# Patient Record
Sex: MALE | Race: ASIAN | NOT HISPANIC OR LATINO
[De-identification: names, ages, dates, MRNs, and addresses within clinical notes are randomized per-mention and may not be internally consistent; named-entity substitution may affect disease eponyms.]

---

## 2021-03-30 ENCOUNTER — APPOINTMENT (OUTPATIENT)
Dept: PULMONOLOGY | Facility: CLINIC | Age: 46
End: 2021-03-30

## 2021-06-01 DIAGNOSIS — Z01.812 ENCOUNTER FOR PREPROCEDURAL LABORATORY EXAMINATION: ICD-10-CM

## 2021-06-28 ENCOUNTER — APPOINTMENT (OUTPATIENT)
Dept: PULMONOLOGY | Facility: CLINIC | Age: 46
End: 2021-06-28
Payer: COMMERCIAL

## 2021-06-28 ENCOUNTER — NON-APPOINTMENT (OUTPATIENT)
Age: 46
End: 2021-06-28

## 2021-06-28 VITALS
DIASTOLIC BLOOD PRESSURE: 84 MMHG | TEMPERATURE: 97.2 F | HEIGHT: 71 IN | OXYGEN SATURATION: 97 % | WEIGHT: 229 LBS | RESPIRATION RATE: 16 BRPM | BODY MASS INDEX: 32.06 KG/M2 | SYSTOLIC BLOOD PRESSURE: 126 MMHG | HEART RATE: 92 BPM

## 2021-06-28 DIAGNOSIS — Z86.19 PERSONAL HISTORY OF OTHER INFECTIOUS AND PARASITIC DISEASES: ICD-10-CM

## 2021-06-28 DIAGNOSIS — J30.9 ALLERGIC RHINITIS, UNSPECIFIED: ICD-10-CM

## 2021-06-28 DIAGNOSIS — Z83.49 FAMILY HISTORY OF OTHER ENDOCRINE, NUTRITIONAL AND METABOLIC DISEASES: ICD-10-CM

## 2021-06-28 DIAGNOSIS — E66.3 OVERWEIGHT: ICD-10-CM

## 2021-06-28 DIAGNOSIS — Z82.5 FAMILY HISTORY OF ASTHMA AND OTHER CHRONIC LOWER RESPIRATORY DISEASES: ICD-10-CM

## 2021-06-28 DIAGNOSIS — Z88.9 ALLERGY STATUS TO UNSPECIFIED DRUGS, MEDICAMENTS AND BIOLOGICAL SUBSTANCES: ICD-10-CM

## 2021-06-28 PROCEDURE — 71046 X-RAY EXAM CHEST 2 VIEWS: CPT

## 2021-06-28 PROCEDURE — 95012 NITRIC OXIDE EXP GAS DETER: CPT

## 2021-06-28 PROCEDURE — 94618 PULMONARY STRESS TESTING: CPT

## 2021-06-28 PROCEDURE — 99204 OFFICE O/P NEW MOD 45 MIN: CPT | Mod: 25

## 2021-06-28 PROCEDURE — 94010 BREATHING CAPACITY TEST: CPT

## 2021-06-28 PROCEDURE — 99072 ADDL SUPL MATRL&STAF TM PHE: CPT

## 2021-06-28 RX ORDER — OLOPATADINE HYDROCHLORIDE 665 UG/1
0.6 SPRAY, METERED NASAL
Qty: 3 | Refills: 1 | Status: ACTIVE | COMMUNITY
Start: 2021-06-28 | End: 1900-01-01

## 2021-06-28 NOTE — REVIEW OF SYSTEMS
[Negative] : Endocrine [Postnasal Drip] : postnasal drip [Cough] : cough [Sputum] : sputum [Wheezing] : wheezing

## 2021-06-28 NOTE — ADDENDUM
[FreeTextEntry1] : Documented by Carlos Velásquez acting as a scribe for Dr. Kel Ybarra on (06/28/2021).\par \par All medical record entries made by the Scribe were at my, Dr. Kel Ybarra's, direction and personally dictated by me on (06/28/2021). I have reviewed the chart and agree that the record accurately reflects my personal performance of the history, physical exam, assessment and plan. I have also personally directed, reviewed, and agree with the discharge instructions.\par

## 2021-06-28 NOTE — HISTORY OF PRESENT ILLNESS
[TextBox_4] : Mr. TAMEZ is a 46 year male with a history of chicken pox as a child, OW, allergies who now comes in for an initial pulmonary evaluation. His chief complaint is\par -He notes being recommended for a Pulmonologist \par -He notes excessive phlegm production in the early spring and mid fall\par -He notes getting dry eyes in the allergy season\par -He notes PND in this season\par -He notes having 2 cats \par -He notes being SOB before starting to exercise, but is now improving as he is exercising more \par -He denies SOB on his back or in the middle of the night\par -He notes snoring, and is worsened when more fatigued\par -He notes waking up feeling well rested\par - he reports being able to fall asleep while watching a boring TV show \par -He notes dislocating his shoulder in the past \par -He notes coughing\par -He notes a slight intermittent wheeze \par -he notes his bowels are regular \par -He notes being lactose intolerant \par -He notes when consuming lactose getting stomach aches, gas\par -He notes taking multivitamins and calcium\par -He denies the feeling of a lump in his throat he needs to clear\par -He denies nocturia\par -He notes getting poked in his eye which has damaged his eye sight in his left eye \par \par - patient denies any headaches, nausea, vomiting, fever, chills, sweats, chest pain, chest pressure, palpitations, fatigue, diarrhea, constipation, dysphagia, myalgias, dizziness, leg swelling, leg pain, itchy eyes, itchy ears, heartburn, reflux or sour taste in the mouth

## 2021-06-28 NOTE — PHYSICAL EXAM
General [No Acute Distress] : no acute distress [Normal Oropharynx] : normal oropharynx [Normal Appearance] : normal appearance [No Neck Mass] : no neck mass [Normal Rate/Rhythm] : normal rate/rhythm [Normal S1, S2] : normal s1, s2 [No Murmurs] : no murmurs [No Resp Distress] : no resp distress [Clear to Auscultation Bilaterally] : clear to auscultation bilaterally [No Abnormalities] : no abnormalities [Benign] : benign [Normal Gait] : normal gait [No Clubbing] : no clubbing [No Cyanosis] : no cyanosis [No Edema] : no edema [FROM] : FROM [Normal Color/ Pigmentation] : normal color/ pigmentation [No Focal Deficits] : no focal deficits [Oriented x3] : oriented x3 [Normal Affect] : normal affect [III] : Mallampati Class: III [TextBox_2] : OW [TextBox_68] : I:E 1:3; Clear

## 2021-06-28 NOTE — ASSESSMENT
[FreeTextEntry1] : Mr. TAMEZ is a 46 year male with a history of chicken pox as a child, infantile PNA, OW, allergies, lactose intolerance who now comes in for an initial pulmonary evaluation for SOB, ?asthma, allergies, ?ELDON\par \par The patient's SOB is felt to be multifactorial:\par -poor mechanics of breathing\par -out of shape/overweight\par -Pulmonary\par    -mild asthma \par    -Allergy/sinus\par    -?ELDON\par \par Problem 1: mild asthma (driven by underlying allergies)\par - add Ventolin 2 puffs Q6H, pre-exercise \par - add Symbicort 160 2 inhalations BID\par - Asthma is believed to be caused by inherited (genetic) and environmental factor, but its exact cause is unknown. asthma may be triggered by allergens, lung infections, or irritants in the air. Asthma triggers are different for each person \par \par Problem 2: Allergy/Sinus\par - add Olopatadine 0.6% 1 sniff BID\par -get Blood work to include: asthma panel, food IgE panel, IgE level, eosinophil level, vitamin D level\par -Environmental measures for allergies were encouraged including mattress and pillow cover, air purifier, and environmental controls.\par \par \par Problem 3: ?ELDON (RF: neck size 17-18, elevated Mallampati class)\par -complete home sleep study \par -Sleep apnea is associated with adverse clinical consequences which can affect most organ systems. Cardiovascular disease risk includes arrhythmias, atrial fibrillation, hypertension, coronary artery disease, and stroke. Metabolic disorders include diabetes type 2, non-alcoholic fatty liver disease. Mood disorder especially depression; and cognitive decline especially in the elderly. Associations with chronic reflux/Youssef’s esophagus some but not all inclusive. \par -Reasons include arousal consistent with hypopnea; respiratory events most prominent in REM sleep or supine position; therefore sleep staging and body position are important for accurate diagnosis and estimation of AHI. \par \par \par Problem 4:overweight/out of shape\par - Weight loss, exercise and diet control were discussed and are highly encouraged. Treatment options were given such as aqua therapy, and contacting a nutritionist. Recommended to use the elliptical, stationary bike, less use of treadmill. Mindful eating was explained to the patient. Obesity is associated with worsening asthma, SOB, and potential for cardiac disease, diabetes, and other underlying medical conditions.\par \par Problem 5: Poor mechanics of breathing\par -Recommended Wimhof and Buteyko breathing techniques \par - Proper breathing techniques were reviewed with an emphasis on exhalation. Patient instructed to breath in for 1 second and out for four seconds. Patient was encouraged not to talk while walking.\par \par Problem 6: Health Maintenance\par -s/p flu shot\par -recommended strep pneumonia vaccines: Prevnar-13 vaccine, follow by Pneumo vaccine 23 one year following\par -recommended early intervention for URIs\par -recommended regular osteoporosis evaluations\par -recommended early dermatological evaluations\par -recommended after the age of 50 to the age of 70, colonoscopy every 5 years\par \par f/u in 6-8 weeks\par pt is encouraged to call or fax the office with any questions or concerns.\par

## 2021-08-13 ENCOUNTER — APPOINTMENT (OUTPATIENT)
Dept: PULMONOLOGY | Facility: CLINIC | Age: 46
End: 2021-08-13

## 2021-08-30 ENCOUNTER — LABORATORY RESULT (OUTPATIENT)
Age: 46
End: 2021-08-30

## 2021-08-30 LAB
24R-OH-CALCIDIOL SERPL-MCNC: 70.9 PG/ML
25(OH)D3 SERPL-MCNC: 16.3 NG/ML
BASOPHILS # BLD AUTO: 0.07 K/UL
BASOPHILS NFR BLD AUTO: 0.9 %
EOSINOPHIL # BLD AUTO: 0.18 K/UL
EOSINOPHIL NFR BLD AUTO: 2.2 %
HCT VFR BLD CALC: 47.3 %
HGB BLD-MCNC: 15.4 G/DL
IMM GRANULOCYTES NFR BLD AUTO: 0.5 %
LYMPHOCYTES # BLD AUTO: 2.04 K/UL
LYMPHOCYTES NFR BLD AUTO: 25.5 %
MAN DIFF?: NORMAL
MCHC RBC-ENTMCNC: 28.4 PG
MCHC RBC-ENTMCNC: 32.6 GM/DL
MCV RBC AUTO: 87.3 FL
MONOCYTES # BLD AUTO: 0.49 K/UL
MONOCYTES NFR BLD AUTO: 6.1 %
NEUTROPHILS # BLD AUTO: 5.19 K/UL
NEUTROPHILS NFR BLD AUTO: 64.8 %
PLATELET # BLD AUTO: 310 K/UL
RBC # BLD: 5.42 M/UL
RBC # FLD: 13.3 %
WBC # FLD AUTO: 8.01 K/UL

## 2021-09-01 LAB — TOTAL IGE SMQN RAST: 377 KU/L

## 2021-09-02 LAB
A ALTERNATA IGE QN: 0.89 KUA/L
A FUMIGATUS IGE QN: <0.1 KUA/L
C ALBICANS IGE QN: 0.31 KUA/L
C HERBARUM IGE QN: <0.1 KUA/L
CAT DANDER IGE QN: 32.3 KUA/L
CLAM IGE QN: <0.1 KUA/L
CODFISH IGE QN: <0.1 KUA/L
COMMON RAGWEED IGE QN: 0.11 KUA/L
CORN IGE QN: <0.1 KUA/L
COW MILK IGE QN: 0.49 KUA/L
D FARINAE IGE QN: 0.77 KUA/L
D PTERONYSS IGE QN: 0.47 KUA/L
DEPRECATED A ALTERNATA IGE RAST QL: 2
DEPRECATED A FUMIGATUS IGE RAST QL: 0
DEPRECATED C ALBICANS IGE RAST QL: NORMAL
DEPRECATED C HERBARUM IGE RAST QL: 0
DEPRECATED CAT DANDER IGE RAST QL: 4
DEPRECATED CLAM IGE RAST QL: 0
DEPRECATED CODFISH IGE RAST QL: 0
DEPRECATED COMMON RAGWEED IGE RAST QL: NORMAL
DEPRECATED CORN IGE RAST QL: 0
DEPRECATED COW MILK IGE RAST QL: 1
DEPRECATED D FARINAE IGE RAST QL: 2
DEPRECATED D PTERONYSS IGE RAST QL: 1
DEPRECATED DOG DANDER IGE RAST QL: 2
DEPRECATED EGG WHITE IGE RAST QL: 0
DEPRECATED M RACEMOSUS IGE RAST QL: 0
DEPRECATED PEANUT IGE RAST QL: 0
DEPRECATED ROACH IGE RAST QL: 2
DEPRECATED SCALLOP IGE RAST QL: <0.1 KUA/L
DEPRECATED SESAME SEED IGE RAST QL: 0
DEPRECATED SHRIMP IGE RAST QL: 1
DEPRECATED SOYBEAN IGE RAST QL: 0
DEPRECATED TIMOTHY IGE RAST QL: NORMAL
DEPRECATED WALNUT IGE RAST QL: 0
DEPRECATED WHEAT IGE RAST QL: 0
DEPRECATED WHITE OAK IGE RAST QL: 3
DOG DANDER IGE QN: 3.09 KUA/L
EGG WHITE IGE QN: <0.1 KUA/L
M RACEMOSUS IGE QN: <0.1 KUA/L
PEANUT IGE QN: <0.1 KUA/L
ROACH IGE QN: 0.91 KUA/L
SCALLOP IGE QN: 0
SCALLOP IGE QN: 0.41 KUA/L
SESAME SEED IGE QN: <0.1 KUA/L
SOYBEAN IGE QN: <0.1 KUA/L
TIMOTHY IGE QN: 0.29 KUA/L
WALNUT IGE QN: <0.1 KUA/L
WHEAT IGE QN: <0.1 KUA/L
WHITE OAK IGE QN: 7.31 KUA/L

## 2021-09-23 ENCOUNTER — NON-APPOINTMENT (OUTPATIENT)
Age: 46
End: 2021-09-23

## 2022-10-04 ENCOUNTER — APPOINTMENT (OUTPATIENT)
Dept: PULMONOLOGY | Facility: CLINIC | Age: 47
End: 2022-10-04

## 2022-10-04 ENCOUNTER — RX RENEWAL (OUTPATIENT)
Age: 47
End: 2022-10-04

## 2022-10-04 VITALS
HEART RATE: 105 BPM | DIASTOLIC BLOOD PRESSURE: 84 MMHG | OXYGEN SATURATION: 99 % | SYSTOLIC BLOOD PRESSURE: 130 MMHG | BODY MASS INDEX: 30.07 KG/M2 | TEMPERATURE: 97.1 F | WEIGHT: 222 LBS | RESPIRATION RATE: 16 BRPM | HEIGHT: 72 IN

## 2022-10-04 DIAGNOSIS — R05.1 ACUTE COUGH: ICD-10-CM

## 2022-10-04 DIAGNOSIS — R09.89 OTHER SPECIFIED SYMPTOMS AND SIGNS INVOLVING THE CIRCULATORY AND RESPIRATORY SYSTEMS: ICD-10-CM

## 2022-10-04 DIAGNOSIS — G93.3 POSTVIRAL FATIGUE SYNDROME: ICD-10-CM

## 2022-10-04 DIAGNOSIS — J45.909 UNSPECIFIED ASTHMA, UNCOMPLICATED: ICD-10-CM

## 2022-10-04 PROCEDURE — 99214 OFFICE O/P EST MOD 30 MIN: CPT | Mod: 25

## 2022-10-04 PROCEDURE — 94010 BREATHING CAPACITY TEST: CPT

## 2022-10-04 PROCEDURE — 94729 DIFFUSING CAPACITY: CPT

## 2022-10-04 PROCEDURE — 94727 GAS DIL/WSHOT DETER LNG VOL: CPT

## 2022-10-04 RX ORDER — ALBUTEROL SULFATE 90 UG/1
108 (90 BASE) INHALANT RESPIRATORY (INHALATION)
Qty: 3 | Refills: 1 | Status: ACTIVE | COMMUNITY
Start: 2022-10-04 | End: 1900-01-01

## 2022-10-04 RX ORDER — FAMOTIDINE 40 MG/1
40 TABLET, FILM COATED ORAL
Qty: 30 | Refills: 3 | Status: ACTIVE | COMMUNITY
Start: 2022-10-04 | End: 1900-01-01

## 2022-10-04 RX ORDER — BUDESONIDE AND FORMOTEROL FUMARATE DIHYDRATE 160; 4.5 UG/1; UG/1
160-4.5 AEROSOL RESPIRATORY (INHALATION) TWICE DAILY
Qty: 1 | Refills: 3 | Status: ACTIVE | COMMUNITY
Start: 2021-06-28 | End: 1900-01-01

## 2022-10-04 RX ORDER — PROMETHAZINE HYDROCHLORIDE AND CODEINE PHOSPHATE 6.25; 1 MG/5ML; MG/5ML
6.25-1 SOLUTION ORAL
Qty: 240 | Refills: 0 | Status: ACTIVE | COMMUNITY
Start: 2022-10-04 | End: 1900-01-01

## 2022-10-04 RX ORDER — PREDNISONE 10 MG/1
10 TABLET ORAL
Qty: 21 | Refills: 0 | Status: ACTIVE | COMMUNITY
Start: 2022-10-04 | End: 1900-01-01

## 2022-10-04 RX ORDER — PROMETHAZINE HYDROCHLORIDE AND PHENYLEPHRINE HYDROCHLORIDE 6.25; 5 MG/5ML; MG/5ML
6.25-5 SYRUP ORAL
Qty: 1 | Refills: 0 | Status: ACTIVE | COMMUNITY
Start: 2022-10-04 | End: 1900-01-01

## 2022-10-04 NOTE — REVIEW OF SYSTEMS
[Cough] : cough [Sputum] : sputum [Wheezing] : wheezing [Negative] : Endocrine [Nasal Congestion] : no nasal congestion [Postnasal Drip] : no postnasal drip [Sinus Problems] : no sinus problems [Chest Tightness] : chest tightness [Dyspnea] : dyspnea [SOB on Exertion] : sob on exertion [TextBox_14] : persistent throat clearing [TextBox_69] : possible reflux

## 2022-10-04 NOTE — ASSESSMENT
[FreeTextEntry1] : Mr. TAMEZ is a 47 year male with a history of chicken pox as a child, infantile PNA, OW, allergies, lactose intolerance who now comes in for follow up/acute visit for cough and SOB. He seems to be recovering from possible walking PNA vs asthmatic bronchitis with subsequent post viral cough.\par We will [facility] results [facility]. \par \par The patient's SOB is felt to be multifactorial:\par -poor mechanics of breathing\par -out of shape/overweight\par -Pulmonary\par    -mild asthma \par    -Allergy/sinus\par    -?ELDON\par \par Problem 1: Acute asthmatic type bronchitis vs PNA\par -[facility]\par -s/p zpak\par -failed tessalon perles for cough\par -follow up cxr in 2 weeks to check for resolution of right sided findings (awaiting report)\par \par Problem 2: Cough and SOB related to asthma hx/post viral cough\par - add Ventolin 2 puffs am and pm for the next week\par - add Symbicort 160 2 inhalations BID and some gargle after use\par -add prednisone 20 mg x 7, 10 mg x 7 in am with food\par -add promethazine with codeine 5-10ml QHS for comfort from cough\par \par Problem 3: globus sensation/probable LPR\par -add famotidine 40 mg PO QHS\par \par Problem 4: chest congestion\par -add mucinex DM with good hydration (samples given)\par \par f/u in 3 months with Dr. Ybarra\par advised him to call office with update on cough- if not improving, he needs to let the office know. \par pt is encouraged to call or fax the office with any questions or concerns.\par

## 2022-10-04 NOTE — HISTORY OF PRESENT ILLNESS
[TextBox_4] : Initial VISIT 1/28/2021:\par Mr. TAMEZ is a 46 year male with a history of chicken pox as a child, OW, allergies who now comes in for an initial pulmonary evaluation. His chief complaint is\par -He notes being recommended for a Pulmonologist \par -He notes excessive phlegm production in the early spring and mid fall\par -He notes getting dry eyes in the allergy season\par -He notes PND in this season\par -He notes having 2 cats \par -He notes being SOB before starting to exercise, but is now improving as he is exercising more \par -He denies SOB on his back or in the middle of the night\par -He notes snoring, and is worsened when more fatigued\par -He notes waking up feeling well rested\par - he reports being able to fall asleep while watching a boring TV show \par -He notes dislocating his shoulder in the past \par -He notes coughing\par -He notes a slight intermittent wheeze \par -he notes his bowels are regular \par -He notes being lactose intolerant \par -He notes when consuming lactose getting stomach aches, gas\par -He notes taking multivitamins and calcium\par -He denies the feeling of a lump in his throat he needs to clear\par -He denies nocturia\par -He notes getting poked in his eye which has damaged his eye sight in his left eye \par \par - patient denies any headaches, nausea, vomiting, fever, chills, sweats, chest pain, chest pressure, palpitations, fatigue, diarrhea, constipation, dysphagia, myalgias, dizziness, leg swelling, leg pain, itchy eyes, itchy ears, heartburn, reflux or sour taste in the mouth \par \par VISIT TODAY 10/4/2022:\par Mr. TAMEZ is a 47 year male with a history of chicken pox as a child, infantile PNA, OW, allergies, lactose intolerance who now comes in for follow up/acute visit for cough and SOB.\par \par At the last visit here pt reports he was told he had some mild asthma and was started on inhalers. \par He felt better and discontinued the inhalers at some point. \par He was in his normal state of health till his children brought home something from school that got him sick.  Main symptoms included severe sore throat, fatigue.  He went to his PCP was tested for strep and COVID, both test came back negative.  He then developed a cough and started to feel more short of breath.  [facility] approximately week and a half later since there was not much resolution.  He reports he had a chest x-ray onsite and was told something may be brewing on the right side.  He was sent home with Tessalon Perles and a Z-Harish.\par \par At this point, other symptoms have resolved but he continues to have a severe cough that is worse at night.  It is keeping him from sleep. Additionally he feels like he cannot take a deep breath in.  He feels like he is not getting enough air.  Breathing and is more difficult than breathing out. He went back on his albuterol inhaler and is using it a.m. and p.m.  He feels like he would use it more if he was allowed to. He feels it does help with both the cough and breathiness. The Tessalon Perles did not help his cough.  He has been taking TheraFlu and NyQuil in excess at night to help knock him out for sleep.  He reports his mother says he is still coughing through the night.  He reports chest tightness/heaviness and chest congestion. When he gets mucus up he reports feeling so much relief. \par \par He is unsure about acid reflux but does admit to persistent throat clearing.\par He is bringing up some dark green mucus on and off, but mainly the cough is dry.\par He wishes he could get more mucus up.

## 2022-10-04 NOTE — PHYSICAL EXAM
[No Acute Distress] : no acute distress [Normal Oropharynx] : normal oropharynx [III] : Mallampati Class: III [No Neck Mass] : no neck mass [Normal Rate/Rhythm] : normal rate/rhythm [Normal S1, S2] : normal s1, s2 [No Murmurs] : no murmurs [No Resp Distress] : no resp distress [Clear to Auscultation Bilaterally] : clear to auscultation bilaterally [No Abnormalities] : no abnormalities [Benign] : benign [Normal Gait] : normal gait [No Clubbing] : no clubbing [No Cyanosis] : no cyanosis [No Edema] : no edema [FROM] : FROM [Normal Color/ Pigmentation] : normal color/ pigmentation [No Focal Deficits] : no focal deficits [Oriented x3] : oriented x3 [Normal Affect] : normal affect [Well Nourished] : well nourished [Normal Appearance] : normal appearance [Well Groomed] : well groomed [No Deformities] : no deformities [Well Developed] : well developed [Supple] : supple [No JVD] : no jvd [Normal Pulses] : normal pulses [No Acc Muscle Use] : no acc muscle use [Normal Palpation] : normal palpation [Normal Rhythm and Effort] : normal rhythm and effort [Normal Mood] : normal mood [Remote Memory Normal] : remote memory normal [TextBox_2] : OW [TextBox_11] : evidence of reflux on tongue [TextBox_68] : I:E 1:3; Clear

## 2022-10-04 NOTE — PROCEDURE
[FreeTextEntry1] : PFTs revealed normal flows\par Normal lung volumes\par Normal diffusion capacity\par \par

## 2022-10-28 ENCOUNTER — EMERGENCY (EMERGENCY)
Facility: HOSPITAL | Age: 47
LOS: 1 days | Discharge: HOME | End: 2022-10-28
Attending: EMERGENCY MEDICINE | Admitting: EMERGENCY MEDICINE

## 2022-10-28 VITALS
RESPIRATION RATE: 18 BRPM | TEMPERATURE: 99 F | HEART RATE: 105 BPM | SYSTOLIC BLOOD PRESSURE: 125 MMHG | DIASTOLIC BLOOD PRESSURE: 83 MMHG | OXYGEN SATURATION: 100 %

## 2022-10-28 VITALS
OXYGEN SATURATION: 97 % | DIASTOLIC BLOOD PRESSURE: 100 MMHG | RESPIRATION RATE: 24 BRPM | HEART RATE: 130 BPM | SYSTOLIC BLOOD PRESSURE: 181 MMHG | TEMPERATURE: 99 F

## 2022-10-28 LAB
ALBUMIN SERPL ELPH-MCNC: 4.9 G/DL — SIGNIFICANT CHANGE UP (ref 3.3–5)
ALP SERPL-CCNC: 71 U/L — SIGNIFICANT CHANGE UP (ref 40–120)
ALT FLD-CCNC: 29 U/L — SIGNIFICANT CHANGE UP (ref 4–41)
ANION GAP SERPL CALC-SCNC: 10 MMOL/L — SIGNIFICANT CHANGE UP (ref 7–14)
AST SERPL-CCNC: 15 U/L — SIGNIFICANT CHANGE UP (ref 4–40)
B PERT DNA SPEC QL NAA+PROBE: SIGNIFICANT CHANGE UP
B PERT+PARAPERT DNA PNL SPEC NAA+PROBE: SIGNIFICANT CHANGE UP
BASOPHILS # BLD AUTO: 0.03 K/UL — SIGNIFICANT CHANGE UP (ref 0–0.2)
BASOPHILS NFR BLD AUTO: 0.2 % — SIGNIFICANT CHANGE UP (ref 0–2)
BILIRUB SERPL-MCNC: 0.5 MG/DL — SIGNIFICANT CHANGE UP (ref 0.2–1.2)
BORDETELLA PARAPERTUSSIS (RAPRVP): SIGNIFICANT CHANGE UP
BUN SERPL-MCNC: 11 MG/DL — SIGNIFICANT CHANGE UP (ref 7–23)
C PNEUM DNA SPEC QL NAA+PROBE: SIGNIFICANT CHANGE UP
CALCIUM SERPL-MCNC: 10.3 MG/DL — SIGNIFICANT CHANGE UP (ref 8.4–10.5)
CHLORIDE SERPL-SCNC: 94 MMOL/L — LOW (ref 98–107)
CO2 SERPL-SCNC: 29 MMOL/L — SIGNIFICANT CHANGE UP (ref 22–31)
CREAT SERPL-MCNC: 0.66 MG/DL — SIGNIFICANT CHANGE UP (ref 0.5–1.3)
EGFR: 116 ML/MIN/1.73M2 — SIGNIFICANT CHANGE UP
EOSINOPHIL # BLD AUTO: 0 K/UL — SIGNIFICANT CHANGE UP (ref 0–0.5)
EOSINOPHIL NFR BLD AUTO: 0 % — SIGNIFICANT CHANGE UP (ref 0–6)
FLUAV SUBTYP SPEC NAA+PROBE: SIGNIFICANT CHANGE UP
FLUBV RNA SPEC QL NAA+PROBE: SIGNIFICANT CHANGE UP
GAS PNL BLDV: SIGNIFICANT CHANGE UP
GLUCOSE SERPL-MCNC: 135 MG/DL — HIGH (ref 70–99)
HADV DNA SPEC QL NAA+PROBE: SIGNIFICANT CHANGE UP
HCOV 229E RNA SPEC QL NAA+PROBE: SIGNIFICANT CHANGE UP
HCOV HKU1 RNA SPEC QL NAA+PROBE: SIGNIFICANT CHANGE UP
HCOV NL63 RNA SPEC QL NAA+PROBE: SIGNIFICANT CHANGE UP
HCOV OC43 RNA SPEC QL NAA+PROBE: SIGNIFICANT CHANGE UP
HCT VFR BLD CALC: 47.2 % — SIGNIFICANT CHANGE UP (ref 39–50)
HGB BLD-MCNC: 15.6 G/DL — SIGNIFICANT CHANGE UP (ref 13–17)
HMPV RNA SPEC QL NAA+PROBE: SIGNIFICANT CHANGE UP
HPIV1 RNA SPEC QL NAA+PROBE: SIGNIFICANT CHANGE UP
HPIV2 RNA SPEC QL NAA+PROBE: SIGNIFICANT CHANGE UP
HPIV3 RNA SPEC QL NAA+PROBE: SIGNIFICANT CHANGE UP
HPIV4 RNA SPEC QL NAA+PROBE: SIGNIFICANT CHANGE UP
IANC: 18.05 K/UL — HIGH (ref 1.8–7.4)
IMM GRANULOCYTES NFR BLD AUTO: 0.8 % — SIGNIFICANT CHANGE UP (ref 0–0.9)
LIDOCAIN IGE QN: 30 U/L — SIGNIFICANT CHANGE UP (ref 7–60)
LYMPHOCYTES # BLD AUTO: 1.01 K/UL — SIGNIFICANT CHANGE UP (ref 1–3.3)
LYMPHOCYTES # BLD AUTO: 5.1 % — LOW (ref 13–44)
M PNEUMO DNA SPEC QL NAA+PROBE: SIGNIFICANT CHANGE UP
MCHC RBC-ENTMCNC: 28.2 PG — SIGNIFICANT CHANGE UP (ref 27–34)
MCHC RBC-ENTMCNC: 33.1 GM/DL — SIGNIFICANT CHANGE UP (ref 32–36)
MCV RBC AUTO: 85.4 FL — SIGNIFICANT CHANGE UP (ref 80–100)
MONOCYTES # BLD AUTO: 0.75 K/UL — SIGNIFICANT CHANGE UP (ref 0–0.9)
MONOCYTES NFR BLD AUTO: 3.8 % — SIGNIFICANT CHANGE UP (ref 2–14)
NEUTROPHILS # BLD AUTO: 18.05 K/UL — HIGH (ref 1.8–7.4)
NEUTROPHILS NFR BLD AUTO: 90.1 % — HIGH (ref 43–77)
NRBC # BLD: 0 /100 WBCS — SIGNIFICANT CHANGE UP (ref 0–0)
NRBC # FLD: 0 K/UL — SIGNIFICANT CHANGE UP (ref 0–0)
PLATELET # BLD AUTO: 339 K/UL — SIGNIFICANT CHANGE UP (ref 150–400)
POTASSIUM SERPL-MCNC: 4.1 MMOL/L — SIGNIFICANT CHANGE UP (ref 3.5–5.3)
POTASSIUM SERPL-SCNC: 4.1 MMOL/L — SIGNIFICANT CHANGE UP (ref 3.5–5.3)
PROT SERPL-MCNC: 8 G/DL — SIGNIFICANT CHANGE UP (ref 6–8.3)
RAPID RVP RESULT: DETECTED
RBC # BLD: 5.53 M/UL — SIGNIFICANT CHANGE UP (ref 4.2–5.8)
RBC # FLD: 12.9 % — SIGNIFICANT CHANGE UP (ref 10.3–14.5)
RSV RNA SPEC QL NAA+PROBE: SIGNIFICANT CHANGE UP
RV+EV RNA SPEC QL NAA+PROBE: SIGNIFICANT CHANGE UP
SARS-COV-2 RNA SPEC QL NAA+PROBE: DETECTED
SODIUM SERPL-SCNC: 133 MMOL/L — LOW (ref 135–145)
WBC # BLD: 19.99 K/UL — HIGH (ref 3.8–10.5)
WBC # FLD AUTO: 19.99 K/UL — HIGH (ref 3.8–10.5)

## 2022-10-28 PROCEDURE — 99284 EMERGENCY DEPT VISIT MOD MDM: CPT

## 2022-10-28 PROCEDURE — 71045 X-RAY EXAM CHEST 1 VIEW: CPT | Mod: 26

## 2022-10-28 PROCEDURE — 93010 ELECTROCARDIOGRAM REPORT: CPT

## 2022-10-28 PROCEDURE — 76705 ECHO EXAM OF ABDOMEN: CPT | Mod: 26

## 2022-10-28 PROCEDURE — 74177 CT ABD & PELVIS W/CONTRAST: CPT | Mod: 26,MA

## 2022-10-28 RX ORDER — SODIUM CHLORIDE 9 MG/ML
1000 INJECTION INTRAMUSCULAR; INTRAVENOUS; SUBCUTANEOUS ONCE
Refills: 0 | Status: COMPLETED | OUTPATIENT
Start: 2022-10-28 | End: 2022-10-28

## 2022-10-28 RX ORDER — MORPHINE SULFATE 50 MG/1
4 CAPSULE, EXTENDED RELEASE ORAL ONCE
Refills: 0 | Status: DISCONTINUED | OUTPATIENT
Start: 2022-10-28 | End: 2022-10-28

## 2022-10-28 RX ORDER — METOCLOPRAMIDE HCL 10 MG
10 TABLET ORAL ONCE
Refills: 0 | Status: COMPLETED | OUTPATIENT
Start: 2022-10-28 | End: 2022-10-28

## 2022-10-28 RX ADMIN — MORPHINE SULFATE 4 MILLIGRAM(S): 50 CAPSULE, EXTENDED RELEASE ORAL at 08:49

## 2022-10-28 RX ADMIN — Medication 10 MILLIGRAM(S): at 08:49

## 2022-10-28 RX ADMIN — SODIUM CHLORIDE 1000 MILLILITER(S): 9 INJECTION INTRAMUSCULAR; INTRAVENOUS; SUBCUTANEOUS at 12:01

## 2022-10-28 RX ADMIN — MORPHINE SULFATE 4 MILLIGRAM(S): 50 CAPSULE, EXTENDED RELEASE ORAL at 10:33

## 2022-10-28 RX ADMIN — SODIUM CHLORIDE 1000 MILLILITER(S): 9 INJECTION INTRAMUSCULAR; INTRAVENOUS; SUBCUTANEOUS at 10:27

## 2022-10-28 NOTE — ED PROVIDER NOTE - CLINICAL SUMMARY MEDICAL DECISION MAKING FREE TEXT BOX
47y male with no significant PMH or surgical history presenting with intense, diffuse abdominal pain since yesterday. Pain is increasing in severity. Multiple episodes of +N/V. Patient was diagnosed with COVID last week but symptoms have been improving. Patient is tachycardic and tachypnic. ORdered labs, meds, and imaging. Concern for ACS vs GI pathology (colitis, appendicitis, pancreatitis, cholecystitis) less likely ischemic at this time due to no PMH or evidence of afib. Will re-assess after labs, meds, imaging.

## 2022-10-28 NOTE — ED ADULT NURSE REASSESSMENT NOTE - NS ED NURSE REASSESS COMMENT FT1
Pt resting comfortably in stretcher, no signs of distress. Pending CT results. Fall precautions in place. Will continue to monitor.
Pt resting comfortable in stretcher, no signs of distress. Pt denies N/V, headache, abdominal pain, SOB. Vitals are stable. Pt waiting for ultrasound results. Fall precautions in place. Wife at bedside. Will continue to monitor.

## 2022-10-28 NOTE — ED PROVIDER NOTE - CARE PLAN
1 Principal Discharge DX:	Abdominal pain  Secondary Diagnosis:	2019 novel coronavirus disease (COVID-19)  Secondary Diagnosis:	Adenomyomatosis of gallbladder  Secondary Diagnosis:	Cholelithiasis

## 2022-10-28 NOTE — ED PROVIDER NOTE - PHYSICAL EXAMINATION
PHYSICAL EXAM:  CONSTITUTIONAL: appearing, awake, alert, oriented to person, place, time/situation and in mild distress.  HEAD: Atraumatic  EYES: Clear bilaterally, pupils equal, round and reactive to light.  ENMT: Airway patent, Nasal mucosa clear. Mouth with normal mucosa. Uvula is midline.   CARDIAC: increased rate, regular rhythm. +S1/S2. No murmurs, rubs or gallops.  RESPIRATORY: Breathing mildly increased. Breath sounds clear and equal bilaterally.  ABDOMEN:  Soft, nontender, nondistended. No rebound tenderness or guarding.  NEUROLOGICAL: Alert and oriented, no focal deficits, no motor or sensory deficits. CN2-12 intact. Sensation intact x4 extremities.  SKIN: Skin warm and dry. No evidence of rashes or lesions.

## 2022-10-28 NOTE — ED ADULT NURSE NOTE - OBJECTIVE STATEMENT
Pt received to rm 8, a&ox4. c/o abdominal pain, chills, N/V since 5pm last night. Pt denies any pmhx, but reports having COVID last week. Pt denies SOB, headache, N/T, dizziness, fatigue, diarrhea. Abdomen in soft, non tender. Last BM was last night as per pt and "normal." Breathing is moderately labored, but even. Pt states "it hurts my stomach to take a deep breath." Pulses are equal bilaterally. Skin dry, intact. Tachycardic on monitor, MD aware. 20g placed in right ac, labs collected and sent. Pt medicated as per MAR. Fall precautions in place. Wife at bedside. Will continue to monitor. Pt received to rm 8, a&ox4. c/o abdominal pain, chills, N/V since 5pm last night. Pt denies any pmhx, but reports having COVID last week. Pt denies SOB, headache, N/T, dizziness, fatigue, diarrhea, fever. Abdomen in soft, nondistended,  nontender. Last BM was last night as per pt and "normal." Breathing is moderately labored, but even. Pt states "it hurts my stomach to take a deep breath." Pulses are equal bilaterally. Skin dry, intact. Sinus tach on monitor, ,  MD aware. 20g placed in right ac, labs collected and sent. Pt medicated as per MAR. Fall precautions in place. Wife at bedside. Will continue to monitor.

## 2022-10-28 NOTE — ED PROVIDER NOTE - PATIENT PORTAL LINK FT
You can access the FollowMyHealth Patient Portal offered by [facility] by registering at the following website: [email or web address]. By joining [email or web address] FollowMyHealth portal, you will also be able to view your health information using other applications (apps) compatible with our system.

## 2022-10-28 NOTE — ED ADULT TRIAGE NOTE - CHIEF COMPLAINT QUOTE
Pt is covid positive since last week. He started having severe abdominal pain and several episodes of vomiting since 5PM. [facility] went to [facility] who sent him to the ER as his heart rate is high. Pt is c/o cough and SOB. elevated BP and heart rate in triage. No PMH.

## 2022-10-28 NOTE — ED PROVIDER NOTE - NSFOLLOWUPINSTRUCTIONS_ED_ALL_ED_FT
YOU WERE SEEN IN THE ED FOR:    WHILE YOU WERE HERE, YOU HAD:  YOU WERE PRESCRIBED:  FOLLOW THE INSTRUCTIONS ON THE LABEL/CONTAINER    FOR PAIN, YOU MAY TAKE TYLENOL (Acetaminophen) AND/OR IBUPROFEN (Advil or Motrin). FOLLOW THE INSTRUCTIONS ON THE LABEL/CONTAINER.    PLEASE FOLLOW UP WITH YOUR PRIVATE PHYSICIAN WITHIN THE NEXT 48 HOURS. BRING COPIES OF YOUR RESULTS. PLEASE FOLLOW UP WITH YOUR GASTROENTEROLOGIST for cholelithiasis.      Abdominal Pain    Many things can cause abdominal pain. Many times, abdominal pain is not caused by a disease and will improve without treatment. Your health care provider will do a physical exam to determine if there is a dangerous cause of your pain; blood tests and imaging may help determine the cause of your pain. However, in many cases, no cause may be found and you may need further testing as an outpatient. Monitor your abdominal pain for any changes.     SEEK IMMEDIATE MEDICAL CARE IF YOU HAVE ANY OF THE FOLLOWING SYMPTOMS: worsening abdominal pain, uncontrollable vomiting, profuse diarrhea, inability to have bowel movements or pass gas, black or bloody stools, fever accompanying chest pain or back pain, worsening abdominal pain, fever, or inability to keep fluids down. or fainting. These symptoms may represent a serious problem that is an emergency. Do not wait to see if the symptoms will go away. Get medical help right away. Call 911 and do not drive yourself to the hospital. YOU WERE SEEN IN THE ED FOR: abdominal pain    WHILE YOU WERE HERE, YOU HAD: CT, ultrasound, lab work, pain medicine    FOR PAIN, YOU MAY TAKE TYLENOL (Acetaminophen) AND/OR IBUPROFEN (Advil or Motrin). FOLLOW THE INSTRUCTIONS ON THE LABEL/CONTAINER.    PLEASE FOLLOW UP WITH YOUR PRIVATE PHYSICIAN WITHIN THE NEXT 48 HOURS. BRING COPIES OF YOUR RESULTS. PLEASE FOLLOW UP WITH YOUR GASTROENTEROLOGIST for cholelithiasis.      Abdominal Pain    Many things can cause abdominal pain. Many times, abdominal pain is not caused by a disease and will improve without treatment. Your health care provider will do a physical exam to determine if there is a dangerous cause of your pain; blood tests and imaging may help determine the cause of your pain. However, in many cases, no cause may be found and you may need further testing as an outpatient. Monitor your abdominal pain for any changes.     SEEK IMMEDIATE MEDICAL CARE IF YOU HAVE ANY OF THE FOLLOWING SYMPTOMS: worsening abdominal pain, uncontrollable vomiting, profuse diarrhea, inability to have bowel movements or pass gas, black or bloody stools, fever accompanying chest pain or back pain, worsening abdominal pain, fever, or inability to keep fluids down. or fainting. These symptoms may represent a serious problem that is an emergency. Do not wait to see if the symptoms will go away. Get medical help right away. Call 911 and do not drive yourself to the hospital.

## 2022-10-28 NOTE — ED PROVIDER NOTE - ATTENDING CONTRIBUTION TO CARE
Attending note:   After face to face evaluation of this patient, I concur with above noted hx, pe, and care plan for this patient.  Call: 47 yom with URI sxs for weeks and covid positive 1 week ago. Pt now with diffuse abdominal pain since last night with nausea and vomiting. No fever or chills, No previous episodes, kids with URI sxs, no travel. Pt has had some dry cough with URI sxs. Pt also had CXR few weeks ago with "early PNA" but that resolved as per wife and never took antibxs. Pt appears non toxic but uncomfortable, clear lungs, no resp distress, RR 20, oropharynx slightly dry mm, abd no significant tn but mildly tender everywhere, no CVAT, tachy without murmur, no skin lesions noted. Pt with tachycardiac, borderline fever and abd sxs - concern for infectious pathology less likely ischemic given lack of medical issues and diffuse tn - labs, pain meds, CT, fluids and reassess.

## 2022-10-28 NOTE — ED PROVIDER NOTE - PROGRESS NOTE DETAILS
Maria Luz Lombardo MD (PGY-1 EM): Discussed with patient CT results. Discussed with patient need for further workup for Indeterminate lesion in the kidney with MRI as outpatient. Disseised with patient discrepancy in size of breast tissue, patient states he has had that since childhood. Discussed distended gallbladder and need for US. Discussed dependent changes on lungs most likely indicating covid pneumonia. Maria Luz Lombardo MD (PGY-1 EM): Discussed with patient U/s results. Patient is not tender to palpation to abdomen. Used share decision making with patient to discuss the abd pain resolving and it unlikely being cholelithiasis. Discussed cholestasis and adenomyomatosis. Discussed WBC elevation and tachycardia. Will PO challenge patient. Maria Luz Lombardo MD (PGY-1 EM): patients pain is resolved. discussed return precautions need for GI and PMD followup. Discussed slightly elevated HR most likely secondary to COVID. Maria Luz Lombardo MD (PGY-1 EM): patients pain is resolved. patient tolerated PO. discussed return precautions need for GI and PMD followup. Discussed slightly elevated HR most likely secondary to COVID. Maria Luz Lombardo MD (PGY-1 EM): patient told to f/u with surgery for cholecystis. jerilyn Maria Luz Lombardo MD (PGY-1 EM): patient told to f/u with surgery for cholecystis. Luis More [phone]

## 2022-10-28 NOTE — ED PROCEDURE NOTE - ATTENDING CONTRIBUTION TO CARE
Dr. Call: I personally supervised this procedure performed by the resident and I agree with the above documentation.

## 2022-10-28 NOTE — ED PROVIDER NOTE - OBJECTIVE STATEMENT
47y male with no significant PMH or surgical history presenting with intense, diffuse abdominal pain since yesterday. Pain is increasing in severity. Multiple episodes of +N/V. Patient was diagnosed with COVID last week but symptoms have been improving. Patient had a bowel movement yesterday. Patient was sent by [facility] due to tachycardia and tachypnea.

## 2022-10-28 NOTE — ED PROVIDER NOTE - NS ED ROS FT
ROS:  -Constitutional: Denies fever  -Head: Denies headache  -Eyes: Denies blurry vision  -Cardiovascular: Denies chest pain  -Pulmonary:+ shortness of breath  -Gastrointestinal: +nausea - diarrhea  -Genitourinary: Denies dysuria  -Skin: Denies new rashes  -Neuro: Denies numbness or tingling

## 2022-10-31 ENCOUNTER — TRANSCRIPTION ENCOUNTER (OUTPATIENT)
Age: 47
End: 2022-10-31

## 2022-10-31 ENCOUNTER — INPATIENT (INPATIENT)
Facility: HOSPITAL | Age: 47
LOS: 2 days | Discharge: HOME | End: 2022-11-03
Attending: STUDENT IN AN ORGANIZED HEALTH CARE EDUCATION/TRAINING PROGRAM | Admitting: STUDENT IN AN ORGANIZED HEALTH CARE EDUCATION/TRAINING PROGRAM

## 2022-10-31 VITALS
HEART RATE: 105 BPM | RESPIRATION RATE: 16 BRPM | OXYGEN SATURATION: 100 % | SYSTOLIC BLOOD PRESSURE: 154 MMHG | DIASTOLIC BLOOD PRESSURE: 85 MMHG | TEMPERATURE: 99 F

## 2022-10-31 DIAGNOSIS — K80.20 CALCULUS OF GALLBLADDER WITHOUT CHOLECYSTITIS WITHOUT OBSTRUCTION: ICD-10-CM

## 2022-10-31 LAB
ALBUMIN SERPL ELPH-MCNC: 3.9 G/DL — SIGNIFICANT CHANGE UP (ref 3.3–5)
ALP SERPL-CCNC: 66 U/L — SIGNIFICANT CHANGE UP (ref 40–120)
ALT FLD-CCNC: 33 U/L — SIGNIFICANT CHANGE UP (ref 4–41)
ANION GAP SERPL CALC-SCNC: 10 MMOL/L — SIGNIFICANT CHANGE UP (ref 7–14)
APTT BLD: 31.7 SEC — SIGNIFICANT CHANGE UP (ref 27–36.3)
AST SERPL-CCNC: 22 U/L — SIGNIFICANT CHANGE UP (ref 4–40)
BASOPHILS # BLD AUTO: 0.05 K/UL — SIGNIFICANT CHANGE UP (ref 0–0.2)
BASOPHILS NFR BLD AUTO: 0.5 % — SIGNIFICANT CHANGE UP (ref 0–2)
BILIRUB SERPL-MCNC: 0.2 MG/DL — SIGNIFICANT CHANGE UP (ref 0.2–1.2)
BLD GP AB SCN SERPL QL: NEGATIVE — SIGNIFICANT CHANGE UP
BUN SERPL-MCNC: 12 MG/DL — SIGNIFICANT CHANGE UP (ref 7–23)
CALCIUM SERPL-MCNC: 9.1 MG/DL — SIGNIFICANT CHANGE UP (ref 8.4–10.5)
CHLORIDE SERPL-SCNC: 103 MMOL/L — SIGNIFICANT CHANGE UP (ref 98–107)
CO2 SERPL-SCNC: 26 MMOL/L — SIGNIFICANT CHANGE UP (ref 22–31)
CREAT SERPL-MCNC: 0.75 MG/DL — SIGNIFICANT CHANGE UP (ref 0.5–1.3)
EGFR: 112 ML/MIN/1.73M2 — SIGNIFICANT CHANGE UP
EOSINOPHIL # BLD AUTO: 0.35 K/UL — SIGNIFICANT CHANGE UP (ref 0–0.5)
EOSINOPHIL NFR BLD AUTO: 3.6 % — SIGNIFICANT CHANGE UP (ref 0–6)
GLUCOSE SERPL-MCNC: 107 MG/DL — HIGH (ref 70–99)
HCT VFR BLD CALC: 42.5 % — SIGNIFICANT CHANGE UP (ref 39–50)
HGB BLD-MCNC: 14.4 G/DL — SIGNIFICANT CHANGE UP (ref 13–17)
IANC: 5.77 K/UL — SIGNIFICANT CHANGE UP (ref 1.8–7.4)
IMM GRANULOCYTES NFR BLD AUTO: 1 % — HIGH (ref 0–0.9)
INR BLD: 0.98 RATIO — SIGNIFICANT CHANGE UP (ref 0.88–1.16)
LIDOCAIN IGE QN: 45 U/L — SIGNIFICANT CHANGE UP (ref 7–60)
LYMPHOCYTES # BLD AUTO: 2.71 K/UL — SIGNIFICANT CHANGE UP (ref 1–3.3)
LYMPHOCYTES # BLD AUTO: 27.7 % — SIGNIFICANT CHANGE UP (ref 13–44)
MCHC RBC-ENTMCNC: 28.7 PG — SIGNIFICANT CHANGE UP (ref 27–34)
MCHC RBC-ENTMCNC: 33.9 GM/DL — SIGNIFICANT CHANGE UP (ref 32–36)
MCV RBC AUTO: 84.7 FL — SIGNIFICANT CHANGE UP (ref 80–100)
MONOCYTES # BLD AUTO: 0.8 K/UL — SIGNIFICANT CHANGE UP (ref 0–0.9)
MONOCYTES NFR BLD AUTO: 8.2 % — SIGNIFICANT CHANGE UP (ref 2–14)
NEUTROPHILS # BLD AUTO: 5.77 K/UL — SIGNIFICANT CHANGE UP (ref 1.8–7.4)
NEUTROPHILS NFR BLD AUTO: 59 % — SIGNIFICANT CHANGE UP (ref 43–77)
NRBC # BLD: 0 /100 WBCS — SIGNIFICANT CHANGE UP (ref 0–0)
NRBC # FLD: 0 K/UL — SIGNIFICANT CHANGE UP (ref 0–0)
PLATELET # BLD AUTO: 327 K/UL — SIGNIFICANT CHANGE UP (ref 150–400)
POTASSIUM SERPL-MCNC: 3.9 MMOL/L — SIGNIFICANT CHANGE UP (ref 3.5–5.3)
POTASSIUM SERPL-SCNC: 3.9 MMOL/L — SIGNIFICANT CHANGE UP (ref 3.5–5.3)
PROT SERPL-MCNC: 7.2 G/DL — SIGNIFICANT CHANGE UP (ref 6–8.3)
PROTHROM AB SERPL-ACNC: 11.4 SEC — SIGNIFICANT CHANGE UP (ref 10.5–13.4)
RBC # BLD: 5.02 M/UL — SIGNIFICANT CHANGE UP (ref 4.2–5.8)
RBC # FLD: 13 % — SIGNIFICANT CHANGE UP (ref 10.3–14.5)
RH IG SCN BLD-IMP: POSITIVE — SIGNIFICANT CHANGE UP
SODIUM SERPL-SCNC: 139 MMOL/L — SIGNIFICANT CHANGE UP (ref 135–145)
WBC # BLD: 9.78 K/UL — SIGNIFICANT CHANGE UP (ref 3.8–10.5)
WBC # FLD AUTO: 9.78 K/UL — SIGNIFICANT CHANGE UP (ref 3.8–10.5)

## 2022-10-31 PROCEDURE — 76705 ECHO EXAM OF ABDOMEN: CPT | Mod: 26

## 2022-10-31 PROCEDURE — 99285 EMERGENCY DEPT VISIT HI MDM: CPT

## 2022-10-31 RX ORDER — SODIUM CHLORIDE 9 MG/ML
1000 INJECTION, SOLUTION INTRAVENOUS
Refills: 0 | Status: DISCONTINUED | OUTPATIENT
Start: 2022-10-31 | End: 2022-11-01

## 2022-10-31 RX ORDER — SODIUM CHLORIDE 9 MG/ML
1000 INJECTION INTRAMUSCULAR; INTRAVENOUS; SUBCUTANEOUS ONCE
Refills: 0 | Status: COMPLETED | OUTPATIENT
Start: 2022-10-31 | End: 2022-10-31

## 2022-10-31 RX ORDER — ENOXAPARIN SODIUM 100 MG/ML
40 INJECTION SUBCUTANEOUS EVERY 24 HOURS
Refills: 0 | Status: DISCONTINUED | OUTPATIENT
Start: 2022-10-31 | End: 2022-11-02

## 2022-10-31 RX ADMIN — SODIUM CHLORIDE 115 MILLILITER(S): 9 INJECTION, SOLUTION INTRAVENOUS at 23:49

## 2022-10-31 RX ADMIN — SODIUM CHLORIDE 1000 MILLILITER(S): 9 INJECTION INTRAMUSCULAR; INTRAVENOUS; SUBCUTANEOUS at 22:30

## 2022-10-31 NOTE — H&P ADULT - NSHPLABSRESULTS_GEN_ALL_CORE
----------  LABORATORY DATA:  ----------                        14.4   9.78  )-----------( 327      ( 31 Oct 2022 19:43 )             42.5               10-31    139  |  103  |  12  ----------------------------<  107<H>  3.9   |  26  |  0.75    Ca    9.1      31 Oct 2022 19:50    TPro  7.2  /  Alb  3.9  /  TBili  0.2  /  DBili  x   /  AST  22  /  ALT  33  /  AlkPhos  66  10-31    LIVER FUNCTIONS - ( 31 Oct 2022 19:50 )  Alb: 3.9 g/dL / Pro: 7.2 g/dL / ALK PHOS: 66 U/L / ALT: 33 U/L / AST: 22 U/L / GGT: x           PT/INR - ( 31 Oct 2022 19:43 )   PT: 11.4 sec;   INR: 0.98 ratio         PTT - ( 31 Oct 2022 19:43 )  PTT:31.7 sec    < from: US Abdomen Upper Quadrant Right (10.28.22 @ 13:16) >      IMPRESSION:  Cholelithiasis and adenomyomatosis of the gallbladder. No secondary   evidence of acute cholecystitis on this exam. Sonographic Mathis's sign   could not be assessed in this patient who has been pretreated with   morphine.    < end of copied text >    10/31 US pending

## 2022-10-31 NOTE — H&P ADULT - HISTORY OF PRESENT ILLNESS
47M otherwise healthy (recent covid infection approximately 10 days ago) presenting from PMD with tachycardia and right upper quadrant discomfort.     Patient reports on 10/28 had severe abdominal pain which resolved after presentation to ED. Also had n/v but passed PO challenge and was sent home with outpatient follow up. He reports his symptoms were better with only abdominal discomfort when lying down/with movement but was tolerating a diet. Felt warm but no measured fevers, night sweats, or chills, no diarrhea, no chest pain or shortness of breath. Does have a cough.

## 2022-10-31 NOTE — H&P ADULT - ATTENDING COMMENTS
Date of service 11/1    Seen and examined at bedside this AM.  47M with no significant PMHx who presented with abdominal pain. He had URI last week and tested COVID + at the end of last week, was initially felt to be improving, however developed abdominal pain last Thursday night. Was seen in ED, worked up, found to have gallstones without signs of cholecystitis, and DC'd home. Reports pain improved but persisted over the weekend, saw PCP yesterday who sent back to the ED due to persistent pain and tachycardia. Pain minimal at this time, located on the right side of his abdomen. No radiation to back, No fever/chills.    vitals reviewed, WNL  Labs: WBC 7.8, LFTs WNL, BMP WNL    Imaging reviewed: Abd US showing stones, adenomyomatosis, GB wall 0.4cm, + Mathis's sign    Abdomen soft, mild TTP on R, negative mathis's sign, no rebound/guarding    Admitted at this time for biliary colic vs early acute cholecystitis  NPO  Plan for OR this morning for lap real, possible cholangiogram  Discussed R/B/A of procedure including infection, bleeding, CBD injury, patient understands risks and wishes to proceed

## 2022-10-31 NOTE — ED ADULT TRIAGE NOTE - CHIEF COMPLAINT QUOTE
sent in by PMD for elevated WBC and elevated heart rate in setting of gallstones. pt states was dx with gallstones on Friday and dc'd home. reports mild abdominal discomfort at present, denies other symptoms. sent in to see surgeon MD Mcpherson. [facility] tested positive for covid Friday. had negative rapid test yesterday.

## 2022-10-31 NOTE — ED PROVIDER NOTE - NS ED ATTENDING STATEMENT MOD
This was a shared visit with the JYOTI. I reviewed and verified the documentation and independently performed the documented:

## 2022-10-31 NOTE — ED PROVIDER NOTE - ATTENDING APP SHARED VISIT CONTRIBUTION OF CARE
Dr. Grande:  I performed a face to face bedside interview with patient regarding history of present illness, review of symptoms and past medical history. I completed an independent physical exam.  I have discussed patient's plan of care with PA.   I agree with note as stated above, having amended the EMR as needed to reflect my findings.   This includes HISTORY OF PRESENT ILLNESS, HIV, PAST MEDICAL/SURGICAL/FAMILY/SOCIAL HISTORY, ALLERGIES AND HOME MEDICATIONS, REVIEW OF SYSTEMS, PHYSICAL EXAM, and any PROGRESS NOTES during the time I functioned as the attending physician for this patient.    47M denies pmh sent by PCP for surgery evaluation of cholelithiasis and tachycardia/leukocytosis.  Pt was seen in ED three days prior for RUQ pain, found to have cholelithiasis.  Pt states since then, symptoms have improved, just mild "tightness"/"discomfort" around RUQ, tolerating PO.  ROS otherwise negative.  PCP requested Dr. Mcpherson (surgeon) for evaluation.    Exam:  - nad  - rrr  - ctab   -abd soft ntnd    A/P  - eval cholelithiasis/cholecystitis  - cbc, cmp, lipase, RUQ US  - surgery consult

## 2022-10-31 NOTE — H&P ADULT - NSHPPHYSICALEXAM_GEN_ALL_CORE
ICU Vital Signs Last 24 Hrs  T(C): 37.1 (31 Oct 2022 17:20), Max: 37.1 (31 Oct 2022 17:20)  T(F): 98.8 (31 Oct 2022 17:20), Max: 98.8 (31 Oct 2022 17:20)  HR: 105 (31 Oct 2022 17:20) (105 - 105)  BP: 154/85 (31 Oct 2022 17:20) (154/85 - 154/85)    General: well developed, well nourished, NAD  Neuro: alert and oriented, no focal deficits, moves all extremities spontaneously  HEENT: NCAT, EOMI, anicteric, mucosa moist  Respiratory: airway patent, respirations unlabored  CVS: regular rate and rhythm  Abdomen: soft, nontender, nondistended  Extremities: no edema, sensation and movement grossly intact  Skin: warm, dry, appropriate color

## 2022-10-31 NOTE — ED PROVIDER NOTE - OBJECTIVE STATEMENT
47-year-old male no reported past medical history presents with mild right upper quadrant discomfort.  Patient was seen in hospital 3 days prior found to have Cholelithiasis.  Patient was seen by PCP today sent back to ED given abnormal vital signs. Per pt, his PCP spoke with Dr. Mcpherson general surgery. Of note patient has been COVID-positive since October 21.  Patient denies nausea vomiting diarrhea dysuria hematuria back pain.

## 2022-10-31 NOTE — ED PROVIDER NOTE - NS_BEDUNITTYPES_ED_ALL_ED
Patient is stable and meets discharge criteria. Patient made aware that he/she must wait on unit to be escorted by PACU RN or PACU PCA to awaiting car in front of the main building after being discharged by Anesthesia Department. SURGERY

## 2022-10-31 NOTE — ED PROVIDER NOTE - CLINICAL SUMMARY MEDICAL DECISION MAKING FREE TEXT BOX
47-year-old male no reported past medical history presents with mild right upper quadrant discomfort.  Patient was seen in hospital 3 days prior found to have Cholelithiasis.  Patient was seen by PCP today sent back to ED given abnormal vital signs. Per pt, his PCP spoke with Dr. Mcpherson general surgery. Of note patient has been COVID-positive since October 21.  Patient denies nausea vomiting diarrhea dysuria hematuria back pain.  plan  - labs  - ruq us  - surgery consult   - ivf   - pain control

## 2022-10-31 NOTE — H&P ADULT - ASSESSMENT
47M with recent biliary cholic and cholelithiasis.    Plan:  -  47M with recent biliary cholic and cholelithiasis.    Plan:  - Admit to Dr. Miner  - CLD, NPO at midnight  - Added on and consented for cholecsytectomy  - Lovenox  - FU US read    Discussed with Dr. Kristofer ARRIETA Team Surgery  [id number]

## 2022-10-31 NOTE — ED ADULT NURSE NOTE - OBJECTIVE STATEMENT
A&Ox4. ambulatory. c/o elevated WBC. NAD. pt denies SOB, chest pain, dizziness, weakness, urinary symptoms, HA, n/v/d, fevers, chills. respirations are even and un labored. skin intact. 20g placed to LAC. labs drawn and sent. safety precautions maintained.

## 2022-10-31 NOTE — ED ADULT NURSE NOTE - CHIEF COMPLAINT QUOTE
sent in by PMD for elevated WBC and elevated heart rate in setting of gallstones. pt states was dx with gallstones on Friday and dc'd home. reports mild abdominal discomfort at present, denies other symptoms. sent in to see surgeon MD Mcpherson. [facility] tested positive for covid Friday. had negative rapid test yesterday.

## 2022-10-31 NOTE — ED ADULT NURSE NOTE - NSIMPLEMENTINTERV_GEN_ALL_ED
Implemented All Universal Safety Interventions:  [place] to call system. Call bell, personal items and telephone within reach. Instruct patient to call for assistance. Room bathroom lighting operational. Non-slip footwear when patient is off stretcher. Physically safe environment: no spills, clutter or unnecessary equipment. Stretcher in lowest position, wheels locked, appropriate side rails in place.

## 2022-11-01 ENCOUNTER — TRANSCRIPTION ENCOUNTER (OUTPATIENT)
Age: 47
End: 2022-11-01

## 2022-11-01 ENCOUNTER — RESULT REVIEW (OUTPATIENT)
Age: 47
End: 2022-11-01

## 2022-11-01 LAB
ALBUMIN SERPL ELPH-MCNC: 4 G/DL — SIGNIFICANT CHANGE UP (ref 3.3–5)
ALP SERPL-CCNC: 59 U/L — SIGNIFICANT CHANGE UP (ref 40–120)
ALT FLD-CCNC: 36 U/L — SIGNIFICANT CHANGE UP (ref 4–41)
ANION GAP SERPL CALC-SCNC: 9 MMOL/L — SIGNIFICANT CHANGE UP (ref 7–14)
APTT BLD: 29.9 SEC — SIGNIFICANT CHANGE UP (ref 27–36.3)
AST SERPL-CCNC: 21 U/L — SIGNIFICANT CHANGE UP (ref 4–40)
BILIRUB SERPL-MCNC: 0.4 MG/DL — SIGNIFICANT CHANGE UP (ref 0.2–1.2)
BLD GP AB SCN SERPL QL: NEGATIVE — SIGNIFICANT CHANGE UP
BUN SERPL-MCNC: 9 MG/DL — SIGNIFICANT CHANGE UP (ref 7–23)
CALCIUM SERPL-MCNC: 8.9 MG/DL — SIGNIFICANT CHANGE UP (ref 8.4–10.5)
CHLORIDE SERPL-SCNC: 102 MMOL/L — SIGNIFICANT CHANGE UP (ref 98–107)
CO2 SERPL-SCNC: 28 MMOL/L — SIGNIFICANT CHANGE UP (ref 22–31)
CREAT SERPL-MCNC: 0.68 MG/DL — SIGNIFICANT CHANGE UP (ref 0.5–1.3)
EGFR: 115 ML/MIN/1.73M2 — SIGNIFICANT CHANGE UP
GLUCOSE BLDC GLUCOMTR-MCNC: 106 MG/DL — HIGH (ref 70–99)
GLUCOSE SERPL-MCNC: 104 MG/DL — HIGH (ref 70–99)
HCT VFR BLD CALC: 41.5 % — SIGNIFICANT CHANGE UP (ref 39–50)
HGB BLD-MCNC: 13.8 G/DL — SIGNIFICANT CHANGE UP (ref 13–17)
INR BLD: 1.03 RATIO — SIGNIFICANT CHANGE UP (ref 0.88–1.16)
MAGNESIUM SERPL-MCNC: 2.2 MG/DL — SIGNIFICANT CHANGE UP (ref 1.6–2.6)
MCHC RBC-ENTMCNC: 28.9 PG — SIGNIFICANT CHANGE UP (ref 27–34)
MCHC RBC-ENTMCNC: 33.3 GM/DL — SIGNIFICANT CHANGE UP (ref 32–36)
MCV RBC AUTO: 86.8 FL — SIGNIFICANT CHANGE UP (ref 80–100)
NRBC # BLD: 0 /100 WBCS — SIGNIFICANT CHANGE UP (ref 0–0)
NRBC # FLD: 0 K/UL — SIGNIFICANT CHANGE UP (ref 0–0)
PHOSPHATE SERPL-MCNC: 4 MG/DL — SIGNIFICANT CHANGE UP (ref 2.5–4.5)
PLATELET # BLD AUTO: 297 K/UL — SIGNIFICANT CHANGE UP (ref 150–400)
POTASSIUM SERPL-MCNC: 3.6 MMOL/L — SIGNIFICANT CHANGE UP (ref 3.5–5.3)
POTASSIUM SERPL-SCNC: 3.6 MMOL/L — SIGNIFICANT CHANGE UP (ref 3.5–5.3)
PROT SERPL-MCNC: 6.9 G/DL — SIGNIFICANT CHANGE UP (ref 6–8.3)
PROTHROM AB SERPL-ACNC: 11.9 SEC — SIGNIFICANT CHANGE UP (ref 10.5–13.4)
RBC # BLD: 4.78 M/UL — SIGNIFICANT CHANGE UP (ref 4.2–5.8)
RBC # FLD: 13 % — SIGNIFICANT CHANGE UP (ref 10.3–14.5)
RH IG SCN BLD-IMP: POSITIVE — SIGNIFICANT CHANGE UP
SARS-COV-2 RNA SPEC QL NAA+PROBE: SIGNIFICANT CHANGE UP
SODIUM SERPL-SCNC: 139 MMOL/L — SIGNIFICANT CHANGE UP (ref 135–145)
WBC # BLD: 7.84 K/UL — SIGNIFICANT CHANGE UP (ref 3.8–10.5)
WBC # FLD AUTO: 7.84 K/UL — SIGNIFICANT CHANGE UP (ref 3.8–10.5)

## 2022-11-01 PROCEDURE — 88304 TISSUE EXAM BY PATHOLOGIST: CPT | Mod: 26

## 2022-11-01 DEVICE — CLIP APPLIER COVIDIEN ENDOCLIP II 10MM MED/LG: Type: IMPLANTABLE DEVICE | Status: FUNCTIONAL

## 2022-11-01 RX ORDER — FAMOTIDINE 10 MG/ML
40 INJECTION INTRAVENOUS DAILY
Refills: 0 | Status: DISCONTINUED | OUTPATIENT
Start: 2022-11-01 | End: 2022-11-03

## 2022-11-01 RX ORDER — BUDESONIDE AND FORMOTEROL FUMARATE DIHYDRATE 160; 4.5 UG/1; UG/1
2 AEROSOL RESPIRATORY (INHALATION)
Refills: 0 | Status: DISCONTINUED | OUTPATIENT
Start: 2022-11-01 | End: 2022-11-03

## 2022-11-01 RX ORDER — OXYCODONE HYDROCHLORIDE 5 MG/1
5 TABLET ORAL EVERY 6 HOURS
Refills: 0 | Status: DISCONTINUED | OUTPATIENT
Start: 2022-11-01 | End: 2022-11-01

## 2022-11-01 RX ORDER — OXYCODONE HYDROCHLORIDE 5 MG/1
10 TABLET ORAL EVERY 6 HOURS
Refills: 0 | Status: DISCONTINUED | OUTPATIENT
Start: 2022-11-01 | End: 2022-11-01

## 2022-11-01 RX ORDER — ONDANSETRON 8 MG/1
4 TABLET, FILM COATED ORAL ONCE
Refills: 0 | Status: DISCONTINUED | OUTPATIENT
Start: 2022-11-01 | End: 2022-11-01

## 2022-11-01 RX ORDER — ALBUTEROL 90 UG/1
2 AEROSOL, METERED ORAL EVERY 6 HOURS
Refills: 0 | Status: DISCONTINUED | OUTPATIENT
Start: 2022-11-01 | End: 2022-11-03

## 2022-11-01 RX ORDER — OXYCODONE HYDROCHLORIDE 5 MG/1
5 TABLET ORAL EVERY 6 HOURS
Refills: 0 | Status: DISCONTINUED | OUTPATIENT
Start: 2022-11-01 | End: 2022-11-03

## 2022-11-01 RX ORDER — HYDROMORPHONE HYDROCHLORIDE 2 MG/ML
0.5 INJECTION INTRAMUSCULAR; INTRAVENOUS; SUBCUTANEOUS
Refills: 0 | Status: DISCONTINUED | OUTPATIENT
Start: 2022-11-01 | End: 2022-11-01

## 2022-11-01 RX ORDER — ACETAMINOPHEN 500 MG
1000 TABLET ORAL EVERY 6 HOURS
Refills: 0 | Status: DISCONTINUED | OUTPATIENT
Start: 2022-11-01 | End: 2022-11-02

## 2022-11-01 RX ADMIN — Medication 1000 MILLIGRAM(S): at 22:08

## 2022-11-01 RX ADMIN — SODIUM CHLORIDE 115 MILLILITER(S): 9 INJECTION, SOLUTION INTRAVENOUS at 11:52

## 2022-11-01 RX ADMIN — ENOXAPARIN SODIUM 40 MILLIGRAM(S): 100 INJECTION SUBCUTANEOUS at 15:02

## 2022-11-01 RX ADMIN — Medication 1000 MILLIGRAM(S): at 15:02

## 2022-11-01 RX ADMIN — SODIUM CHLORIDE 115 MILLILITER(S): 9 INJECTION, SOLUTION INTRAVENOUS at 15:01

## 2022-11-01 RX ADMIN — Medication 1000 MILLIGRAM(S): at 16:15

## 2022-11-01 RX ADMIN — FAMOTIDINE 40 MILLIGRAM(S): 10 INJECTION INTRAVENOUS at 15:02

## 2022-11-01 NOTE — PATIENT PROFILE ADULT - FALL HARM RISK - UNIVERSAL INTERVENTIONS
Bed in lowest position, wheels locked, appropriate side rails in place/Call bell, personal items and telephone in reach/Instruct patient to call for assistance before getting out of bed or chair/Non-slip footwear when patient is out of bed/[place] to call system/Physically safe environment - no spills, clutter or unnecessary equipment/Purposeful Proactive Rounding/Room/bathroom lighting operational, light cord in reach

## 2022-11-01 NOTE — CHART NOTE - NSCHARTNOTEFT_GEN_A_CORE
Procedure: laparoscopic cholecystectomy  Surgeon: Kristofer    S: Pt has no complaints. Denies CP, SOB, BURNETT, calf tenderness. Pain controlled with minimal pain medication.    O:  T(C): 36.9 (11-01-22 @ 13:14), Max: 36.9 (11-01-22 @ 13:14)  T(F): 98.5 (11-01-22 @ 13:14), Max: 98.5 (11-01-22 @ 13:14)  HR: 85 (11-01-22 @ 13:14) (80 - 85)  BP: 136/72 (11-01-22 @ 13:14) (135/69 - 146/72)  RR: 17 (11-01-22 @ 13:14) (16 - 17)  SpO2: 98% (11-01-22 @ 13:14) (98% - 99%)  Wt(kg): --                        13.8   7.84  )-----------( 297      ( 01 Nov 2022 05:00 )             41.5     11-01    139  |  102  |  9   ----------------------------<  104<H>  3.6   |  28  |  0.68    Ca    8.9      01 Nov 2022 05:00  Phos  4.0     11-01  Mg     2.20     11-01    TPro  6.9  /  Alb  4.0  /  TBili  0.4  /  DBili  x   /  AST  21  /  ALT  36  /  AlkPhos  59  11-01      Gen: NAD, resting comfortably in bed  C/V: NSR  Pulm: Nonlabored breathing, no respiratory distress  Abd: soft, appropriately TTP in epigastrum ND Incision: 4 laparoscopic port sites c/d/i w/dermabond over them  Extrem: WWP, no calf edema, SCDs in place      A/P: 47yMale s/p above procedure  Diet: regular  IVF: LR at 115  Pain/nausea control: tylenol  DVT ppx: lovenox  Dispo plan: 8 tower

## 2022-11-01 NOTE — DISCHARGE NOTE PROVIDER - HOSPITAL COURSE
48 y/o male with no signficant PMHX presents complaining of RUQ abdominal pain. Imaging performed in the ED consistent with acute cholecystitis w/ cholelithiasis.  The patient was admitted to the surgical service 10/31 and underwent laparoscopic cholecystectomy 11/1. The patient tolerated the procedure well. Post-operatively the patient was sent to the PACU. The patient was hemodynamically stable and was transferred to a surgical floor. Patient tolerated operation well and there were no post operative complications identified. The patient was advanced to a regular diet and tolerated it well. The patient was placed on home medications. At the time of discharge, the patient was hemodynamically stable, was tolerating PO diet, was voiding urine and passing stool, was ambulating, and was comfortable with adequate pain control. The patient was instructed to follow up with Dr. Miner within 1-2 weeks after discharge from the hospital. The patient/family felt comfortable with discharge. The patient had no other issues. 48 y/o male with no signficant PMHX presents complaining of RUQ abdominal pain. Imaging performed in the ED consistent with acute cholecystitis w/ cholelithiasis.  The patient was admitted to the surgical service 10/31 and underwent laparoscopic cholecystectomy 11/1. The patient tolerated the procedure well. Post-operatively the patient was sent to the PACU. The patient was hemodynamically stable and was transferred to a surgical floor. Patient tolerated operation well and there were no post operative complications identified. The patient was advanced to a regular diet and tolerated it well. The patient was placed on home medications. At the time of discharge, the patient was hemodynamically stable, was tolerating PO diet, was voiding urine and passing stool, was ambulating, and was comfortable with adequate pain control. The patient was instructed to follow up with Dr. Miner within 1-2 weeks after discharge from the hospital. The patient/family felt comfortable with discharge. The patient had no other issues.     Incidental finding on CT Abdomen consistent with an indeterminate 9 mm high attenuation lesion in the upper pole the right kidney. Recommend outpatient follow-up with urology appointment for further workup.   48 y/o male with no signficant PMHX presents complaining of RUQ abdominal pain. Imaging performed in the ED consistent with acute cholecystitis w/ cholelithiasis.  The patient was admitted to the surgical service 10/31 and underwent laparoscopic cholecystectomy 11/1. The patient tolerated the procedure well. Post-operatively the patient was sent to the PACU. The patient was hemodynamically stable and was transferred to a surgical floor. Patient tolerated operation well and there were no post operative complications identified. The patient was advanced to a regular diet and tolerated it well. The patient was placed on home medications. At the time of discharge, the patient was hemodynamically stable, was tolerating PO diet, was voiding urine and passing gas, was ambulating, and was comfortable with adequate pain control. The patient was instructed to follow up with Dr. Miner within 1-2 weeks after discharge from the hospital. The patient/family felt comfortable with discharge. The patient had no other issues.     Incidental finding on CT Abdomen consistent with an indeterminate 9 mm high attenuation lesion in the upper pole the right kidney. Recommend outpatient follow-up with urology appointment for further workup.   46 y/o male with no signficant PMHX presents complaining of RUQ abdominal pain. Imaging performed in the ED consistent with acute cholecystitis w/ cholelithiasis.  The patient was admitted to the surgical service 10/31 and underwent laparoscopic cholecystectomy 11/1. The patient tolerated the procedure well. Post-operatively the patient was sent to the PACU. The patient was hemodynamically stable and was transferred to a surgical floor. The patient was advanced to a regular diet and tolerated it well. The patient was placed on home medications.     11/2/22: Total Bilirubin was slightly elevated at 2. HIDA and MRCP ordered to evaluate hyperbilirubinemia and r/o bile leak and or choledocho.    11/2/22: HIDA Findings: Abnormal postoperative hepatobiliary scan. There is delayed clearance of radiotracer from the liver with hepatic retention of activity noted at the end of the study, compatible with hepatocellular dysfunction. No evidence of bile leak or biliary obstruction. Hepatocellular dysfunction.    11/3/22: Total Bili slightly decreased to 1.5 and Direct Bili 0.8.     (MRCP vs repeat CMP)    At the time of discharge, the patient was hemodynamically stable, was tolerating PO diet, was voiding urine and passing gas, was ambulating, and was comfortable with adequate pain control. The patient was instructed to follow up with Dr. Miner within 1-2 weeks after discharge from the hospital. The patient/family felt comfortable with discharge. The patient had no other issues.     Incidental finding on CT Abdomen consistent with an indeterminate 9 mm high attenuation lesion in the upper pole the right kidney. Recommend outpatient follow-up with urology appointment for further workup.   48 y/o male with no signficant PMHX presents complaining of RUQ abdominal pain. Imaging performed in the ED consistent with acute cholecystitis w/ cholelithiasis.  The patient was admitted to the surgical service 10/31 and underwent laparoscopic cholecystectomy 11/1. The patient tolerated the procedure well. Post-operatively the patient was sent to the PACU. The patient was hemodynamically stable and was transferred to a surgical floor. The patient was advanced to a regular diet and tolerated it well. The patient was placed on home medications.     11/2/22: Total Bilirubin was slightly elevated at 2. HIDA and MRCP ordered to evaluate hyperbilirubinemia and r/o bile leak and or choledocho.    11/2/22: HIDA Findings: Abnormal postoperative hepatobiliary scan. There is delayed clearance of radiotracer from the liver with hepatic retention of activity noted at the end of the study, compatible with hepatocellular dysfunction. No evidence of bile leak or biliary obstruction. Hepatocellular dysfunction.    11/3/22: Total Bili slightly decreased to 1.5 and Direct Bili 0.8.   MRCP showed:  No choledocholithiasis. Normal caliber biliary tree.  Status post cholecystectomy without evidence of collection or ascites.    Per Dr. Rowe, patient was deemed stable for discharge home and will follow up with Dr. Miner in 1-2 weeks after discharge from the hospital.     At the time of discharge, the patient was hemodynamically stable, was tolerating PO diet, was voiding urine and passing gas, was ambulating, and was comfortable with adequate pain control. The patient/family felt comfortable with discharge. The patient had no other issues.     Incidental finding on CT Abdomen consistent with an indeterminate 9 mm high attenuation lesion in the upper pole the right kidney. Recommend outpatient follow-up with urology appointment for further workup.   46 y/o male with no signficant PMHX presents complaining of RUQ abdominal pain. Imaging performed in the ED consistent with acute cholecystitis w/ cholelithiasis.  The patient was admitted to the surgical service 10/31 and underwent laparoscopic cholecystectomy 11/1. The patient tolerated the procedure well. Post-operatively the patient was sent to the PACU. The patient was hemodynamically stable and was transferred to a surgical floor. The patient was advanced to a regular diet and tolerated it well. The patient was placed on home medications.     11/2/22: Total Bilirubin was slightly elevated at 2. HIDA and MRCP ordered to evaluate hyperbilirubinemia and r/o bile leak and or choledocho.    11/2/22: HIDA Findings: Abnormal postoperative hepatobiliary scan. There is delayed clearance of radiotracer from the liver with hepatic retention of activity noted at the end of the study, compatible with hepatocellular dysfunction. No evidence of bile leak or biliary obstruction. Hepatocellular dysfunction.    11/3/22: Total Bili slightly decreased to 1.5 and Direct Bili 0.8.   MRCP showed:  No choledocholithiasis. Normal caliber biliary tree.  Status post cholecystectomy without evidence of collection or ascites.    Per Dr. Miner, patient was deemed stable for discharge home and will follow up with Dr. Miner in 1-2 weeks after discharge from the hospital.     At the time of discharge, the patient was hemodynamically stable, was tolerating PO diet, was voiding urine and passing gas, was ambulating, and was comfortable with adequate pain control. The patient/family felt comfortable with discharge. The patient had no other issues.     Incidental finding on CT Abdomen consistent with an indeterminate 9 mm high attenuation lesion in the upper pole the right kidney. Recommend outpatient follow-up with urology appointment for further workup.

## 2022-11-01 NOTE — PROGRESS NOTE ADULT - SUBJECTIVE AND OBJECTIVE BOX
A Team (General Surgery) Daily Progress Note    SUBJECTIVE:  Pt seen and examined, and is resting comfortably in bed. No acute events overnight. Patient reports no pain. Pt has no complaints at this time. +/+ for flatus and BM but says he feels constipated.     OBJECTIVE:  Vital Signs Last 24 Hrs  T(C): 36.7 (01 Nov 2022 06:47), Max: 37.1 (31 Oct 2022 17:20)  T(F): 98 (01 Nov 2022 06:47), Max: 98.8 (31 Oct 2022 17:20)  HR: 88 (01 Nov 2022 06:47) (82 - 105)  BP: 138/80 (01 Nov 2022 06:47) (118/78 - 154/85)  BP(mean): --  RR: 18 (01 Nov 2022 06:47) (16 - 18)  SpO2: 98% (01 Nov 2022 06:00) (97% - 100%)    Parameters below as of 01 Nov 2022 06:00  Patient On (Oxygen Delivery Method): room air        I&O's Detail    31 Oct 2022 07:01  -  01 Nov 2022 07:00  --------------------------------------------------------  IN:    Lactated Ringers: 345 mL  Total IN: 345 mL    OUT:    Oral Fluid: 0 mL    Voided (mL): 1 mL  Total OUT: 1 mL    Total NET: 344 mL        Exam:  GEN: A&Ox3, NAD  HEENT: atraumatic, normocephalic  CV: no JVD  RESP: no increased work of breathing, no use of accessory muscles  GI/ABD: soft, NTND, no rebound or guarding  EXTREMITIES: warm, pink, well-perfused                        13.8   7.84  )-----------( 297      ( 01 Nov 2022 05:00 )             41.5       11-01    139  |  102  |  9   ----------------------------<  104<H>  3.6   |  28  |  0.68    Ca    8.9      01 Nov 2022 05:00  Phos  4.0     11-01  Mg     2.20     11-01    TPro  6.9  /  Alb  4.0  /  TBili  0.4  /  DBili  x   /  AST  21  /  ALT  36  /  AlkPhos  59  11-01      PT/INR - ( 01 Nov 2022 05:00 )   PT: 11.9 sec;   INR: 1.03 ratio         PTT - ( 01 Nov 2022 05:00 )  PTT:29.9 sec

## 2022-11-01 NOTE — DISCHARGE NOTE PROVIDER - NSDCMRMEDTOKEN_GEN_ALL_CORE_FT
acetaminophen 500 mg oral tablet: 2 tab(s) orally every 6 hours   acetaminophen 500 mg oral tablet: 2 tab(s) orally every 6 hours  oxyCODONE 5 mg oral capsule: 1 cap(s) orally every 6 hours, As Needed -for severe pain MDD:4    acetaminophen 500 mg oral tablet: 1-2 tab(s) orally every 6 hours, As Needed for mild post operative pain  oxyCODONE 5 mg oral capsule: 1 cap(s) orally every 6 hours, As Needed -for severe pain MDD:4

## 2022-11-01 NOTE — DISCHARGE NOTE PROVIDER - CARE PROVIDER_API CALL
Emilio Miner (DO)  Surgery  [address] [facility] [address]  [place] [address]  Phone: [phone]  Fax: [phone]  Follow Up Time: 2 weeks

## 2022-11-01 NOTE — PATIENT PROFILE ADULT - FUNCTIONAL ASSESSMENT - BASIC MOBILITY 1.
Pupils equal, round and reactive to light, Extra-ocular movement intact, eyes are clear b/l 4 = No assist / stand by assistance

## 2022-11-01 NOTE — DISCHARGE NOTE PROVIDER - NSDCCPCAREPLAN_GEN_ALL_CORE_FT
PRINCIPAL DISCHARGE DIAGNOSIS  Diagnosis: Cholelithiasis  Assessment and Plan of Treatment: WOUND CARE:  Please keep incisions clean and dry. Please do not Scrub or rub incisions. Do not use lotion or powder on incisions.   BATHING: You may shower and/or sponge bathe. You may use warm soapy water in the shower and rinse, pat dry.  ACTIVITY: No heavy lifting or straining. Otherwise, you may return to your usual level of physical activity. If you are taking narcotic pain medication DO NOT drive a car, operate machinery or make important decisions.  DIET: LOW FAT DIET  NOTIFY YOUR SURGEON IF YOU HAVE: any bleeding that does not stop, any pus draining from your wound(s), any fever (over 100.4 F) persistent nausea/vomiting, or if your pain is not controlled on your discharge pain medications, unable to urinate.  Please follow up with your primary care physician in one week regarding your hospitalization, bring copies of your discharge paperwork.  Please follow up with your surgeon, Dr. Miner within 1-2 weeks of discharge. Please call to schedule your appointment.      SECONDARY DISCHARGE DIAGNOSES  Diagnosis: Kidney lesion  Assessment and Plan of Treatment: During your admission, at CT was performed in the ED. An incidental finding was found on your kidney. It is described by the radiolgist as 9mm high attenuation lesion in the upper pole of the right kidney. It is recommended that you follow up with your primary care provider regarding these findings. Further workup and additional imaging may be required.      Diagnosis: Gynecomastia  Assessment and Plan of Treatment: During your admission, at CT was performed in the ED. An incidental finding was found that may describe Gynecomastia. It is recommending that you follow up with additional dedicated breast imaging. Please follow up with your PCP regarding these findings.     PRINCIPAL DISCHARGE DIAGNOSIS  Diagnosis: Cholelithiasis  Assessment and Plan of Treatment: WOUND CARE:  Please keep incisions clean and dry. Please do not Scrub or rub incisions. Do not use lotion or powder on incisions.   BATHING: You may shower and/or sponge bathe. You may use warm soapy water in the shower and rinse, pat dry.  ACTIVITY: No heavy lifting or straining. Otherwise, you may return to your usual level of physical activity. If you are taking narcotic pain medication DO NOT drive a car, operate machinery or make important decisions.  DIET: LOW FAT DIET  NOTIFY YOUR SURGEON IF YOU HAVE: any bleeding that does not stop, any pus draining from your wound(s), any fever (over 100.4 F) persistent nausea/vomiting, or if your pain is not controlled on your discharge pain medications, unable to urinate.  Please follow up with your primary care physician in one week regarding your hospitalization, bring copies of your discharge paperwork.  Please follow up with your surgeon, Dr. Miner within 1-2 weeks of discharge. Please call to schedule your appointment.      SECONDARY DISCHARGE DIAGNOSES  Diagnosis: Kidney lesion  Assessment and Plan of Treatment: During your admission, at CT was performed in the ED. An incidental finding was found on your kidney. It is described by the radiolgist as 9mm high attenuation lesion in the upper pole of the right kidney. It is recommended that you follow up with your primary care provider regarding these findings. Further workup and additional imaging may be required.

## 2022-11-01 NOTE — DISCHARGE NOTE PROVIDER - NSDCFUSCHEDAPPT_GEN_ALL_CORE_FT
Kle Ybarra  [facility]  [facility] [address] [facility]  Scheduled Appointment: 01/09/2023

## 2022-11-01 NOTE — DISCHARGE NOTE PROVIDER - NSDCFUADDINST_GEN_ALL_CORE_FT
Incidental finding on CT Abdomen consistent with an indeterminate 9 mm high attenuation lesion in the upper pole the right kidney. A pre and post IV contrast MRI of the abdomen is recommended for further evaluation. Recommend outpatient follow-up with urology appointment for further workup. Further workup and additional imaging may be required.   If you do not have a Urologist, please call   [facility]  [address] [place], [place] [address] [phone]    Also found on your CT was Prominent soft tissue in the left retroareolar region which can be related to gynecomastia; dedicated breast imaging is recommended.   On your HIDA Scan hepatocellular disease was noted. It is recommended that you follow up with your primary care provider regarding both of these findings. Further workup and additional imaging may be required.

## 2022-11-01 NOTE — PROGRESS NOTE ADULT - ASSESSMENT
47M with recent biliary cholic and cholelithiasis.    Plan  - cholecystectomy today      A Team Surgery  [id number]

## 2022-11-01 NOTE — PATIENT PROFILE ADULT - FUNCTIONAL ASSESSMENT - BASIC MOBILITY 4.
Consult ordered    Mom denies difficulty, states she just finished breastfeeding and offered formula.  BF #1 for 6 months, #2 for a year.  Offers formula if baby still fussy after feeding.    Provided education on establishing breastfeeding without formula.  Mom has no questions or concerns   4 = No assist / stand by assistance

## 2022-11-01 NOTE — BRIEF OPERATIVE NOTE - OPERATION/FINDINGS
Omental adhesions to gallbladder, acute inflammation, thick cystic duct, numerous small stones in gallbladder

## 2022-11-02 LAB
ALBUMIN SERPL ELPH-MCNC: 3.6 G/DL — SIGNIFICANT CHANGE UP (ref 3.3–5)
ALP SERPL-CCNC: 87 U/L — SIGNIFICANT CHANGE UP (ref 40–120)
ALT FLD-CCNC: 230 U/L — HIGH (ref 4–41)
ANION GAP SERPL CALC-SCNC: 11 MMOL/L — SIGNIFICANT CHANGE UP (ref 7–14)
AST SERPL-CCNC: 247 U/L — HIGH (ref 4–40)
BILIRUB SERPL-MCNC: 2 MG/DL — HIGH (ref 0.2–1.2)
BUN SERPL-MCNC: 10 MG/DL — SIGNIFICANT CHANGE UP (ref 7–23)
CALCIUM SERPL-MCNC: 8.6 MG/DL — SIGNIFICANT CHANGE UP (ref 8.4–10.5)
CHLORIDE SERPL-SCNC: 103 MMOL/L — SIGNIFICANT CHANGE UP (ref 98–107)
CO2 SERPL-SCNC: 25 MMOL/L — SIGNIFICANT CHANGE UP (ref 22–31)
CREAT SERPL-MCNC: 0.76 MG/DL — SIGNIFICANT CHANGE UP (ref 0.5–1.3)
EGFR: 112 ML/MIN/1.73M2 — SIGNIFICANT CHANGE UP
GLUCOSE SERPL-MCNC: 121 MG/DL — HIGH (ref 70–99)
HCT VFR BLD CALC: 39.5 % — SIGNIFICANT CHANGE UP (ref 39–50)
HGB BLD-MCNC: 12.9 G/DL — LOW (ref 13–17)
MAGNESIUM SERPL-MCNC: 2.2 MG/DL — SIGNIFICANT CHANGE UP (ref 1.6–2.6)
MCHC RBC-ENTMCNC: 28.7 PG — SIGNIFICANT CHANGE UP (ref 27–34)
MCHC RBC-ENTMCNC: 32.7 GM/DL — SIGNIFICANT CHANGE UP (ref 32–36)
MCV RBC AUTO: 87.8 FL — SIGNIFICANT CHANGE UP (ref 80–100)
NRBC # BLD: 0 /100 WBCS — SIGNIFICANT CHANGE UP (ref 0–0)
NRBC # FLD: 0 K/UL — SIGNIFICANT CHANGE UP (ref 0–0)
PHOSPHATE SERPL-MCNC: 3.4 MG/DL — SIGNIFICANT CHANGE UP (ref 2.5–4.5)
PLATELET # BLD AUTO: 296 K/UL — SIGNIFICANT CHANGE UP (ref 150–400)
POTASSIUM SERPL-MCNC: 3.9 MMOL/L — SIGNIFICANT CHANGE UP (ref 3.5–5.3)
POTASSIUM SERPL-SCNC: 3.9 MMOL/L — SIGNIFICANT CHANGE UP (ref 3.5–5.3)
PROT SERPL-MCNC: 6.7 G/DL — SIGNIFICANT CHANGE UP (ref 6–8.3)
RBC # BLD: 4.5 M/UL — SIGNIFICANT CHANGE UP (ref 4.2–5.8)
RBC # FLD: 13 % — SIGNIFICANT CHANGE UP (ref 10.3–14.5)
SODIUM SERPL-SCNC: 139 MMOL/L — SIGNIFICANT CHANGE UP (ref 135–145)
WBC # BLD: 10.2 K/UL — SIGNIFICANT CHANGE UP (ref 3.8–10.5)
WBC # FLD AUTO: 10.2 K/UL — SIGNIFICANT CHANGE UP (ref 3.8–10.5)

## 2022-11-02 PROCEDURE — 78226 HEPATOBILIARY SYSTEM IMAGING: CPT | Mod: 26,GC

## 2022-11-02 RX ORDER — ACETAMINOPHEN 500 MG
2 TABLET ORAL
Qty: 0 | Refills: 0 | DISCHARGE
Start: 2022-11-02

## 2022-11-02 RX ORDER — OXYCODONE HYDROCHLORIDE 5 MG/1
1 TABLET ORAL
Qty: 12 | Refills: 0
Start: 2022-11-02 | End: 2022-11-04

## 2022-11-02 RX ORDER — SODIUM CHLORIDE 9 MG/ML
1000 INJECTION, SOLUTION INTRAVENOUS
Refills: 0 | Status: DISCONTINUED | OUTPATIENT
Start: 2022-11-02 | End: 2022-11-02

## 2022-11-02 RX ORDER — ENOXAPARIN SODIUM 100 MG/ML
40 INJECTION SUBCUTANEOUS ONCE
Refills: 0 | Status: COMPLETED | OUTPATIENT
Start: 2022-11-02 | End: 2022-11-02

## 2022-11-02 RX ORDER — SODIUM CHLORIDE 9 MG/ML
1000 INJECTION, SOLUTION INTRAVENOUS
Refills: 0 | Status: DISCONTINUED | OUTPATIENT
Start: 2022-11-02 | End: 2022-11-03

## 2022-11-02 RX ADMIN — ENOXAPARIN SODIUM 40 MILLIGRAM(S): 100 INJECTION SUBCUTANEOUS at 22:18

## 2022-11-02 RX ADMIN — BUDESONIDE AND FORMOTEROL FUMARATE DIHYDRATE 2 PUFF(S): 160; 4.5 AEROSOL RESPIRATORY (INHALATION) at 22:19

## 2022-11-02 RX ADMIN — BUDESONIDE AND FORMOTEROL FUMARATE DIHYDRATE 2 PUFF(S): 160; 4.5 AEROSOL RESPIRATORY (INHALATION) at 00:56

## 2022-11-02 RX ADMIN — BUDESONIDE AND FORMOTEROL FUMARATE DIHYDRATE 2 PUFF(S): 160; 4.5 AEROSOL RESPIRATORY (INHALATION) at 10:58

## 2022-11-02 RX ADMIN — FAMOTIDINE 40 MILLIGRAM(S): 10 INJECTION INTRAVENOUS at 11:00

## 2022-11-02 RX ADMIN — Medication 1000 MILLIGRAM(S): at 10:58

## 2022-11-02 RX ADMIN — Medication 1000 MILLIGRAM(S): at 05:43

## 2022-11-02 NOTE — PROGRESS NOTE ADULT - SUBJECTIVE AND OBJECTIVE BOX
A Team (General Surgery) Daily Progress Note    SUBJECTIVE:  Pt seen and examined, and is resting comfortably in bed. No acute events overnight. Pain is adequately controlled on current regimen. Pt has some R shoulder pain. Ambulating without issues. Passing gas, no BM yet.     OBJECTIVE:  Vital Signs Last 24 Hrs  T(C): 36.8 (01 Nov 2022 23:34), Max: 36.9 (01 Nov 2022 11:10)  T(F): 98.2 (01 Nov 2022 23:34), Max: 98.5 (01 Nov 2022 13:14)  HR: 99 (01 Nov 2022 23:34) (77 - 99)  BP: 120/67 (01 Nov 2022 23:34) (120/67 - 146/72)  BP(mean): 79 (01 Nov 2022 23:34) (79 - 96)  RR: 18 (01 Nov 2022 23:34) (12 - 20)  SpO2: 96% (01 Nov 2022 23:34) (94% - 107%)    Parameters below as of 01 Nov 2022 23:34  Patient On (Oxygen Delivery Method): room air        I&O's Detail    01 Nov 2022 07:01  -  02 Nov 2022 07:00  --------------------------------------------------------  IN:  Total IN: 0 mL    OUT:    Voided (mL): 240 mL  Total OUT: 240 mL    Total NET: -240 mL        Exam:  Gen: NAD, resting comfortably in bed  Pulm: Nonlabored breathing, no respiratory distress  Abd: soft, appropriately TTP, ND Incision: 4 laparoscopic port sites c/d/i w/ dermabond over them  Extrem: WWP, no calf edema, SCDs in place                        12.9   10.20 )-----------( 296      ( 02 Nov 2022 06:40 )             39.5       11-02    139  |  103  |  10  ----------------------------<  121<H>  3.9   |  25  |  0.76    Ca    8.6      02 Nov 2022 06:40  Phos  3.4     11-02  Mg     2.20     11-02    TPro  6.7  /  Alb  3.6  /  TBili  2.0<H>  /  DBili  x   /  AST  247<H>  /  ALT  230<H>  /  AlkPhos  87  11-02      PT/INR - ( 01 Nov 2022 05:00 )   PT: 11.9 sec;   INR: 1.03 ratio         PTT - ( 01 Nov 2022 05:00 )  PTT:29.9 sec

## 2022-11-02 NOTE — PROGRESS NOTE ADULT - ATTENDING COMMENTS
See attestation from H/P for additional details    agree with above, for lap cholecystectomy today
Date of service 11/2    No events. feeling well overall. had some serosanguinous drainage from umbilical incision, resolved. Tolerating diet. C/o R shoulder pain, otherwise pain well controlled    VSS, afebrile  labs reviewed, WBC 10.2, Bilirubin mildly elevated today - dBili 2.0; AST /230    Abdomen soft, appropriate incisional TTP, incisions c/d/i    Continue diet as tolerated  Will plan for MRCP to evaluate for possible retained stone  AM labs, monitor LFTs  Pain control  OOB and ambulate

## 2022-11-02 NOTE — PROGRESS NOTE ADULT - ASSESSMENT
47y Male s/p nayan noble    Plan:  -regular diet  -pain control  -DVT ppx  -home later today    A Team  [id number] 47y Male s/p lap real with increasing  T bili    Plan:  -regular diet  -pain control  -DVT ppx  -GI c/s  -MRCP w/ contrast    A Team  [id number]

## 2022-11-02 NOTE — CONSULT NOTE ADULT - ASSESSMENT
47y Male presenting with abdominal pain found with cholecystitis s/p lap real on 11/1 now with increasing  T bili and concern for possible retained stone. GI called for further evaluation.     #Acute cholecystitis s/p Cholecystectomy  #Elevated Bilirubin and liver enzymes  -Concern for possible retained stone, pending MRCP      Recommendations:  -Agree with MRCP  -Keep NPO after midnight in case patient needs ERCP tomorrow  -GI will continue to follow    Recommendations preliminary until signed by attending.     Shorty Beaulieu MD  Gastroenterology/Hepatology Fellow  1st option: [phone] (text or call), ONLY available from 7:00 am to 5:00 pm.   **Contact on-call GI fellow via answering service ([phone]) from 5pm-7am AND on weekends/holidays**  2nd option: Available via [employer] Teams  3rd option: Pager: [phone]

## 2022-11-02 NOTE — CONSULT NOTE ADULT - SUBJECTIVE AND OBJECTIVE BOX
HPI:  47y Male presenting with abdominal pain found with cholecystitis s/p lap real on 11/1 now with increasing  T bili and concern for possible retained stone. GI called for further evaluation.     Allergies:  No Known Allergies        Hospital Medications:  acetaminophen     Tablet .. 1000 milliGRAM(s) Oral every 6 hours  ALBUTerol    90 MICROgram(s) HFA Inhaler 2 Puff(s) Inhalation every 6 hours PRN  budesonide 160 MICROgram(s)/formoterol 4.5 MICROgram(s) Inhaler 2 Puff(s) Inhalation two times a day  enoxaparin Injectable 40 milliGRAM(s) SubCutaneous every 24 hours  famotidine    Tablet 40 milliGRAM(s) Oral daily  lactated ringers. 1000 milliLiter(s) IV Continuous <Continuous>  oxyCODONE    IR 5 milliGRAM(s) Oral every 6 hours PRN      PMHX/PSHX:  No pertinent past medical history        Family history:      Social History: no smoking    ROS:   All negative except as mentioned above.       PHYSICAL EXAM:   Gen: NAD, resting comfortably in bed  Pulm: Nonlabored breathing, no respiratory distress  Abd: soft, mild TTP, 4 laparoscopic port sites with dermabond over them  Extrem: WWP, no calf edema,              Vital Signs:  Vital Signs Last 24 Hrs  T(C): 36.8 (02 Nov 2022 09:31), Max: 36.9 (01 Nov 2022 19:54)  T(F): 98.2 (02 Nov 2022 09:31), Max: 98.4 (01 Nov 2022 19:54)  HR: 84 (02 Nov 2022 09:31) (81 - 99)  BP: 149/84 (02 Nov 2022 09:31) (120/67 - 149/84)  BP(mean): 79 (01 Nov 2022 23:34) (79 - 81)  RR: 17 (02 Nov 2022 09:31) (17 - 18)  SpO2: 99% (02 Nov 2022 09:31) (96% - 107%)    Parameters below as of 02 Nov 2022 09:31  Patient On (Oxygen Delivery Method): room air      Daily     Daily     LABS:                        12.9   10.20 )-----------( 296      ( 02 Nov 2022 06:40 )             39.5     Mean Cell Volume: 87.8 fL (11-02-22 @ 06:40)    11-02    139  |  103  |  10  ----------------------------<  121<H>  3.9   |  25  |  0.76    Ca    8.6      02 Nov 2022 06:40  Phos  3.4     11-02  Mg     2.20     11-02    TPro  6.7  /  Alb  3.6  /  TBili  2.0<H>  /  DBili  x   /  AST  247<H>  /  ALT  230<H>  /  AlkPhos  87  11-02    LIVER FUNCTIONS - ( 02 Nov 2022 06:40 )  Alb: 3.6 g/dL / Pro: 6.7 g/dL / ALK PHOS: 87 U/L / ALT: 230 U/L / AST: 247 U/L / GGT: x           PT/INR - ( 01 Nov 2022 05:00 )   PT: 11.9 sec;   INR: 1.03 ratio         PTT - ( 01 Nov 2022 05:00 )  PTT:29.9 sec                            12.9   10.20 )-----------( 296      ( 02 Nov 2022 06:40 )             39.5                         13.8   7.84  )-----------( 297      ( 01 Nov 2022 05:00 )             41.5                         14.4   9.78  )-----------( 327      ( 31 Oct 2022 19:43 )             42.5     Imaging:  Reviewed

## 2022-11-03 ENCOUNTER — TRANSCRIPTION ENCOUNTER (OUTPATIENT)
Age: 47
End: 2022-11-03

## 2022-11-03 VITALS
DIASTOLIC BLOOD PRESSURE: 74 MMHG | OXYGEN SATURATION: 97 % | TEMPERATURE: 100 F | RESPIRATION RATE: 18 BRPM | SYSTOLIC BLOOD PRESSURE: 164 MMHG | HEART RATE: 99 BPM

## 2022-11-03 LAB
ALBUMIN SERPL ELPH-MCNC: 3.6 G/DL — SIGNIFICANT CHANGE UP (ref 3.3–5)
ALP SERPL-CCNC: 112 U/L — SIGNIFICANT CHANGE UP (ref 40–120)
ALT FLD-CCNC: 283 U/L — HIGH (ref 4–41)
ANION GAP SERPL CALC-SCNC: 9 MMOL/L — SIGNIFICANT CHANGE UP (ref 7–14)
AST SERPL-CCNC: 162 U/L — HIGH (ref 4–40)
BILIRUB DIRECT SERPL-MCNC: 0.8 MG/DL — HIGH (ref 0–0.3)
BILIRUB SERPL-MCNC: 1.5 MG/DL — HIGH (ref 0.2–1.2)
BUN SERPL-MCNC: 8 MG/DL — SIGNIFICANT CHANGE UP (ref 7–23)
CALCIUM SERPL-MCNC: 8.6 MG/DL — SIGNIFICANT CHANGE UP (ref 8.4–10.5)
CHLORIDE SERPL-SCNC: 103 MMOL/L — SIGNIFICANT CHANGE UP (ref 98–107)
CO2 SERPL-SCNC: 26 MMOL/L — SIGNIFICANT CHANGE UP (ref 22–31)
CREAT SERPL-MCNC: 0.76 MG/DL — SIGNIFICANT CHANGE UP (ref 0.5–1.3)
EGFR: 112 ML/MIN/1.73M2 — SIGNIFICANT CHANGE UP
GLUCOSE SERPL-MCNC: 98 MG/DL — SIGNIFICANT CHANGE UP (ref 70–99)
HCT VFR BLD CALC: 40.1 % — SIGNIFICANT CHANGE UP (ref 39–50)
HGB BLD-MCNC: 13.3 G/DL — SIGNIFICANT CHANGE UP (ref 13–17)
MAGNESIUM SERPL-MCNC: 2.3 MG/DL — SIGNIFICANT CHANGE UP (ref 1.6–2.6)
MCHC RBC-ENTMCNC: 29.2 PG — SIGNIFICANT CHANGE UP (ref 27–34)
MCHC RBC-ENTMCNC: 33.2 GM/DL — SIGNIFICANT CHANGE UP (ref 32–36)
MCV RBC AUTO: 87.9 FL — SIGNIFICANT CHANGE UP (ref 80–100)
NRBC # BLD: 0 /100 WBCS — SIGNIFICANT CHANGE UP (ref 0–0)
NRBC # FLD: 0 K/UL — SIGNIFICANT CHANGE UP (ref 0–0)
PHOSPHATE SERPL-MCNC: 3.7 MG/DL — SIGNIFICANT CHANGE UP (ref 2.5–4.5)
PLATELET # BLD AUTO: 296 K/UL — SIGNIFICANT CHANGE UP (ref 150–400)
POTASSIUM SERPL-MCNC: 4.2 MMOL/L — SIGNIFICANT CHANGE UP (ref 3.5–5.3)
POTASSIUM SERPL-SCNC: 4.2 MMOL/L — SIGNIFICANT CHANGE UP (ref 3.5–5.3)
PROT SERPL-MCNC: 6.8 G/DL — SIGNIFICANT CHANGE UP (ref 6–8.3)
RBC # BLD: 4.56 M/UL — SIGNIFICANT CHANGE UP (ref 4.2–5.8)
RBC # FLD: 13.5 % — SIGNIFICANT CHANGE UP (ref 10.3–14.5)
SODIUM SERPL-SCNC: 138 MMOL/L — SIGNIFICANT CHANGE UP (ref 135–145)
WBC # BLD: 6.69 K/UL — SIGNIFICANT CHANGE UP (ref 3.8–10.5)
WBC # FLD AUTO: 6.69 K/UL — SIGNIFICANT CHANGE UP (ref 3.8–10.5)

## 2022-11-03 PROCEDURE — 74183 MRI ABD W/O CNTR FLWD CNTR: CPT | Mod: 26

## 2022-11-03 PROCEDURE — 99232 SBSQ HOSP IP/OBS MODERATE 35: CPT | Mod: GC

## 2022-11-03 RX ADMIN — SODIUM CHLORIDE 75 MILLILITER(S): 9 INJECTION, SOLUTION INTRAVENOUS at 00:06

## 2022-11-03 RX ADMIN — FAMOTIDINE 40 MILLIGRAM(S): 10 INJECTION INTRAVENOUS at 17:30

## 2022-11-03 RX ADMIN — BUDESONIDE AND FORMOTEROL FUMARATE DIHYDRATE 2 PUFF(S): 160; 4.5 AEROSOL RESPIRATORY (INHALATION) at 17:29

## 2022-11-03 NOTE — PROGRESS NOTE ADULT - ASSESSMENT
47y Male s/p lap real on 11/1 with increasing T bili    Plan:  - NPO for possible ERCP  -pain control  -DVT ppx  -GI c/s: agree w/MRCP  -MRCP w/ contrast    A Team  [id number]

## 2022-11-03 NOTE — PROGRESS NOTE ADULT - SUBJECTIVE AND OBJECTIVE BOX
A Team (General Surgery) Daily Progress Note    SUBJECTIVE:  Pt seen and examined, and is resting comfortably in bed. No acute events overnight. Pain is adequately controlled on current regimen. Pt has no complaints at this time. +-/+- for flatus and BM.     OBJECTIVE:  Vital Signs Last 24 Hrs  T(C): 36.7 (03 Nov 2022 05:17), Max: 37.3 (02 Nov 2022 16:51)  T(F): 98 (03 Nov 2022 05:17), Max: 99.1 (02 Nov 2022 16:51)  HR: 89 (03 Nov 2022 05:17) (79 - 89)  BP: 140/91 (03 Nov 2022 05:17) (130/76 - 149/84)  BP(mean): --  RR: 18 (03 Nov 2022 05:17) (17 - 18)  SpO2: 98% (03 Nov 2022 05:17) (97% - 99%)    Parameters below as of 03 Nov 2022 05:17  Patient On (Oxygen Delivery Method): room air        I&O's Detail    01 Nov 2022 07:01  -  02 Nov 2022 07:00  --------------------------------------------------------  IN:  Total IN: 0 mL    OUT:    Voided (mL): 240 mL  Total OUT: 240 mL    Total NET: -240 mL      02 Nov 2022 07:01  -  03 Nov 2022 05:47  --------------------------------------------------------  IN:    Lactated Ringers: 375 mL    Oral Fluid: 240 mL  Total IN: 615 mL    OUT:    Voided (mL): 550 mL  Total OUT: 550 mL    Total NET: 65 mL        Exam:  Gen: NAD, resting comfortably in bed  Pulm: Nonlabored breathing, no respiratory distress  Abd: soft, appropriately TTP, ND Incision: 4 laparoscopic port sites c/d/i w/ dermabond over them  Extrem: WWP, no calf edema, SCDs in place                          12.9   10.20 )-----------( 296      ( 02 Nov 2022 06:40 )             39.5       11-02    139  |  103  |  10  ----------------------------<  121<H>  3.9   |  25  |  0.76    Ca    8.6      02 Nov 2022 06:40  Phos  3.4     11-02  Mg     2.20     11-02    TPro  6.7  /  Alb  3.6  /  TBili  2.0<H>  /  DBili  x   /  AST  247<H>  /  ALT  230<H>  /  AlkPhos  87  11-02             A Team (General Surgery) Daily Progress Note    SUBJECTIVE:  Pt seen and examined, and is resting comfortably in bed. No acute events overnight. Pain is adequately controlled on current regimen. Pt has no complaints at this time. +/+ for flatus and BM.     OBJECTIVE:  Vital Signs Last 24 Hrs  T(C): 36.7 (03 Nov 2022 05:17), Max: 37.3 (02 Nov 2022 16:51)  T(F): 98 (03 Nov 2022 05:17), Max: 99.1 (02 Nov 2022 16:51)  HR: 89 (03 Nov 2022 05:17) (79 - 89)  BP: 140/91 (03 Nov 2022 05:17) (130/76 - 149/84)  BP(mean): --  RR: 18 (03 Nov 2022 05:17) (17 - 18)  SpO2: 98% (03 Nov 2022 05:17) (97% - 99%)    Parameters below as of 03 Nov 2022 05:17  Patient On (Oxygen Delivery Method): room air        I&O's Detail    01 Nov 2022 07:01  -  02 Nov 2022 07:00  --------------------------------------------------------  IN:  Total IN: 0 mL    OUT:    Voided (mL): 240 mL  Total OUT: 240 mL    Total NET: -240 mL      02 Nov 2022 07:01  -  03 Nov 2022 05:47  --------------------------------------------------------  IN:    Lactated Ringers: 375 mL    Oral Fluid: 240 mL  Total IN: 615 mL    OUT:    Voided (mL): 550 mL  Total OUT: 550 mL    Total NET: 65 mL        Exam:  Gen: NAD, resting comfortably in bed  Pulm: Nonlabored breathing, no respiratory distress  Abd: soft, appropriately TTP around umbilicus, ND Incision: 4 laparoscopic port sites c/d/i w/ dermabond over them  Extrem: WWP, no calf edema, SCDs in place                          12.9   10.20 )-----------( 296      ( 02 Nov 2022 06:40 )             39.5       11-02    139  |  103  |  10  ----------------------------<  121<H>  3.9   |  25  |  0.76    Ca    8.6      02 Nov 2022 06:40  Phos  3.4     11-02  Mg     2.20     11-02    TPro  6.7  /  Alb  3.6  /  TBili  2.0<H>  /  DBili  x   /  AST  247<H>  /  ALT  230<H>  /  AlkPhos  87  11-02

## 2022-11-03 NOTE — DISCHARGE NOTE NURSING/CASE MANAGEMENT/SOCIAL WORK - NSDCPEFALRISK_GEN_ALL_CORE
For information on Fall & Injury Prevention, visit: [email or web address] OR  [email or web address] OR  https://www.cdc.gov/steadi/patient.html

## 2022-11-03 NOTE — DISCHARGE NOTE NURSING/CASE MANAGEMENT/SOCIAL WORK - PATIENT PORTAL LINK FT
You can access the FollowMyHealth Patient Portal offered by [facility] by registering at the following website: [email or web address]. By joining [facility]’s FollowMyHealth portal, you will also be able to view your health information using other applications (apps) compatible with our system.

## 2022-11-09 LAB — SURGICAL PATHOLOGY STUDY: SIGNIFICANT CHANGE UP

## 2023-01-09 ENCOUNTER — APPOINTMENT (OUTPATIENT)
Dept: PULMONOLOGY | Facility: CLINIC | Age: 48
End: 2023-01-09

## 2023-03-06 PROBLEM — Z78.9 OTHER SPECIFIED HEALTH STATUS: Chronic | Status: ACTIVE | Noted: 2022-10-31

## 2023-03-27 NOTE — ED PROVIDER NOTE - NS ED MD DISPO DISCHARGE
The pts grandfather called and stated that the pt is looking for help with stress management. He is requesting to see Behavioral Management. Dr. Mirza approved this and the pts grandfather was given the number for the pt to call to initiate [facility]al [facility]. Also, the Behavioral Health Coordinator was routed a message about this per Dr. Braun request.   
Home

## 2023-06-15 ENCOUNTER — APPOINTMENT (OUTPATIENT)
Dept: PULMONOLOGY | Facility: CLINIC | Age: 48
End: 2023-06-15

## 2023-09-11 ENCOUNTER — APPOINTMENT (OUTPATIENT)
Dept: PULMONOLOGY | Facility: CLINIC | Age: 48
End: 2023-09-11

## 2023-10-27 NOTE — DISCHARGE NOTE PROVIDER - NSDCACTIVITY_GEN_ALL_CORE
Follow Instructions Provided by your Surgical Team Tumor Depth: Less than 6mm from granular layer and no invasion beyond the subcutaneous fat

## 2024-01-01 NOTE — PROCEDURE
[FreeTextEntry1] : Feno was 88; a normal value being less than 25. Fractional exhaled nitric oxide (FENO) is regarded as a simple, noninvasive method for assessing eosinophilic airway inflammation. Produced by a variety of cells within the lung, nitric oxide (NO) concentrations are generally low in healthy individuals. However, high concentrations of NO appear to be involved in nonspecific host defense mechanisms and chronic inflammatory  diseases such as asthma. The American Thoracic Society (ATS) therefore recommended using FENO to aid in the diagnosis and monitoring of eosinophilic airway inflammation and asthma, and for identifying steroid responsive individuals whose chronic respiratory symptoms may be caused by airway inflammation \par \par 6 minute walk test reveals a low saturation of 97% with slight dyspnea or fatigue; walked 554.2 meters\par \par PFT revealed normal flows, with a FEV1 of 3.94L, which is 111% of predicted, with a normal flow volume loop\par  \par CXR revealed a normal sized heart; there was no evidence of infiltrate or effusion -- A normal appearing chest radiograph \par \par -Images and procedures reviewed in detail and discussed with patient. 
2024

## 2024-01-30 ENCOUNTER — APPOINTMENT (OUTPATIENT)
Dept: PULMONOLOGY | Facility: CLINIC | Age: 49
End: 2024-01-30
Payer: COMMERCIAL

## 2024-01-30 VITALS
RESPIRATION RATE: 16 BRPM | DIASTOLIC BLOOD PRESSURE: 80 MMHG | TEMPERATURE: 97 F | WEIGHT: 270 LBS | OXYGEN SATURATION: 98 % | SYSTOLIC BLOOD PRESSURE: 130 MMHG | BODY MASS INDEX: 36.57 KG/M2 | HEART RATE: 90 BPM | HEIGHT: 72 IN

## 2024-01-30 DIAGNOSIS — E66.3 OVERWEIGHT: ICD-10-CM

## 2024-01-30 DIAGNOSIS — J45.909 UNSPECIFIED ASTHMA, UNCOMPLICATED: ICD-10-CM

## 2024-01-30 DIAGNOSIS — R93.89 ABNORMAL FINDINGS ON DIAGNOSTIC IMAGING OF OTHER SPECIFIED BODY STRUCTURES: ICD-10-CM

## 2024-01-30 DIAGNOSIS — R06.02 SHORTNESS OF BREATH: ICD-10-CM

## 2024-01-30 DIAGNOSIS — R06.83 SNORING: ICD-10-CM

## 2024-01-30 PROCEDURE — 95012 NITRIC OXIDE EXP GAS DETER: CPT

## 2024-01-30 PROCEDURE — G2211 COMPLEX E/M VISIT ADD ON: CPT

## 2024-01-30 PROCEDURE — 99214 OFFICE O/P EST MOD 30 MIN: CPT | Mod: 25

## 2024-01-30 PROCEDURE — 94010 BREATHING CAPACITY TEST: CPT

## 2024-01-30 RX ORDER — AZITHROMYCIN 250 MG/1
250 TABLET, FILM COATED ORAL
Qty: 1 | Refills: 0 | Status: ACTIVE | COMMUNITY
Start: 2024-01-30 | End: 1900-01-01

## 2024-01-30 RX ORDER — PROMETHAZINE HYDROCHLORIDE AND DEXTROMETHORPHAN HYDROBROMIDE ORAL SOLUTION 15; 6.25 MG/5ML; MG/5ML
6.25-15 SOLUTION ORAL
Qty: 473 | Refills: 0 | Status: ACTIVE | COMMUNITY
Start: 2024-01-30 | End: 1900-01-01

## 2024-01-30 RX ORDER — ALBUTEROL SULFATE 90 UG/1
108 (90 BASE) AEROSOL, METERED RESPIRATORY (INHALATION) EVERY 6 HOURS
Qty: 1 | Refills: 2 | Status: ACTIVE | COMMUNITY
Start: 2021-06-28 | End: 1900-01-01

## 2024-01-30 RX ORDER — FLUTICASONE PROPIONATE 50 UG/1
50 SPRAY, METERED NASAL TWICE DAILY
Qty: 1 | Refills: 0 | Status: ACTIVE | COMMUNITY
Start: 2024-01-30 | End: 1900-01-01

## 2024-01-30 NOTE — ASSESSMENT
[FreeTextEntry1] : Problem 1: Allergic Rhinitis - Add Z cherelle   Problem 1a: Cough r/t Allergic Rhinitis -Add Promethazine DM every 6 hours as needed for cough  Problem 1b: PND/Nasal Congestion r/t Allergic Rhinitis -OTC Sudafed -Flonase nasal spray 1 spray BID -Recommended Sinugator nasal rinse daily  Problem 2: Asthma -Renew Ventolin inhaler as needed -Not taking Symbicort  Problem3: R/O Sleep apnea (snoring, Mallampati III, enlarged base of tongue, neck size >16) - HST ([facility]) ordered. Patient will call to  equipment.   Follow up appt in 4-6 months with Dr. Ybarra Encouraged to call the office with any questions or concerns.

## 2024-01-30 NOTE — PHYSICAL EXAM
[No Acute Distress] : no acute distress [Enlarged Base of the Tongue] : enlarged base of the tongue [III] : Mallampati Class: III [Normal Appearance] : normal appearance [No Neck Mass] : no neck mass [Normal Rate/Rhythm] : normal rate/rhythm [Normal S1, S2] : normal s1, s2 [No Resp Distress] : no resp distress [Clear to Auscultation Bilaterally] : clear to auscultation bilaterally [No Abnormalities] : no abnormalities [Benign] : benign [Not Tender] : not tender [Soft] : soft [Normal Gait] : normal gait [No Clubbing] : no clubbing [No Edema] : no edema [FROM] : FROM [Normal Color/ Pigmentation] : normal color/ pigmentation [No Focal Deficits] : no focal deficits [Oriented x3] : oriented x3 [Normal Affect] : normal affect

## 2024-01-30 NOTE — HISTORY OF PRESENT ILLNESS
[Never] : never [TextBox_4] : Mr. TAMEZ is a 48 year male with a history of chicken pox as a child, infantile PNA, OW, allergies, lactose intolerance, ?asthma. Patient presents to office for follow up appt.   Patient reports persistent productive yellow cough over one week, worse in the morning when he wakes up after congestion accumulates from the night. Patient reports that he may have caught something from his children. Patient reports he tried Robitussin and Mucinex with little improvement of cough.  Reports he is going to [place] in February and wants to ensure that his symptoms resolve by then.    Patient reports his father had sleep apnea but did not want to use CPAP machine. Reported that he snores. Reports being able to fall asleep easily. States he feels he has enough energy and feels well rested.   - patient denies any shortness of breath, headaches, nausea, vomiting, fever, chills, sweats, chest pain, chest pressure, palpitations, fatigue, diarrhea, constipation, dysphagia, myalgias, dizziness, leg swelling, leg pain, itchy eyes, itchy ears, heartburn, reflux or sour taste in the mouth

## 2024-09-03 ENCOUNTER — APPOINTMENT (OUTPATIENT)
Dept: PULMONOLOGY | Facility: CLINIC | Age: 49
End: 2024-09-03

## 2024-09-30 ENCOUNTER — APPOINTMENT (OUTPATIENT)
Dept: PULMONOLOGY | Facility: CLINIC | Age: 49
End: 2024-09-30
Payer: COMMERCIAL

## 2024-09-30 VITALS
DIASTOLIC BLOOD PRESSURE: 94 MMHG | HEART RATE: 120 BPM | OXYGEN SATURATION: 98 % | RESPIRATION RATE: 16 BRPM | BODY MASS INDEX: 31.29 KG/M2 | WEIGHT: 231 LBS | SYSTOLIC BLOOD PRESSURE: 142 MMHG | HEIGHT: 72 IN | TEMPERATURE: 97.4 F

## 2024-09-30 DIAGNOSIS — R09.89 OTHER SPECIFIED SYMPTOMS AND SIGNS INVOLVING THE CIRCULATORY AND RESPIRATORY SYSTEMS: ICD-10-CM

## 2024-09-30 DIAGNOSIS — J45.909 UNSPECIFIED ASTHMA, UNCOMPLICATED: ICD-10-CM

## 2024-09-30 DIAGNOSIS — R06.02 SHORTNESS OF BREATH: ICD-10-CM

## 2024-09-30 PROCEDURE — 71046 X-RAY EXAM CHEST 2 VIEWS: CPT

## 2024-09-30 PROCEDURE — 99214 OFFICE O/P EST MOD 30 MIN: CPT | Mod: 25

## 2024-09-30 RX ORDER — AZITHROMYCIN 500 MG/1
500 TABLET, FILM COATED ORAL DAILY
Qty: 7 | Refills: 0 | Status: ACTIVE | COMMUNITY
Start: 2024-09-30 | End: 1900-01-01

## 2024-09-30 NOTE — PHYSICAL EXAM
[No Acute Distress] : no acute distress [Normal Oropharynx] : normal oropharynx [Normal Appearance] : normal appearance [Supple] : supple [No Neck Mass] : no neck mass [Normal Rate/Rhythm] : normal rate/rhythm [Normal S1, S2] : normal s1, s2 [No Murmurs] : no murmurs [No Resp Distress] : no resp distress [Wheeze] : wheeze [No Abnormalities] : no abnormalities [Benign] : benign [Not Tender] : not tender [Soft] : soft [Normal Bowel Sounds] : normal bowel sounds [Normal Gait] : normal gait [No Clubbing] : no clubbing [No Cyanosis] : no cyanosis [No Edema] : no edema [FROM] : FROM [Normal Color/ Pigmentation] : normal color/ pigmentation [No Focal Deficits] : no focal deficits [Oriented x3] : oriented x3 [Normal Mood] : normal mood [Normal Affect] : normal affect

## 2024-09-30 NOTE — HISTORY OF PRESENT ILLNESS
[TextBox_4] : Mr. TAMEZ is a 49 year male with a history of chicken pox as a child, infantile PNA, OW, allergies, lactose intolerance, ?asthma. Patient presents to office for follow up appt.  -reports started feeling ill approximately 2 weeks ago -reports he went to [facility] 2x -reports his RVP was negative -reports she was given a Zpack but did not take it -reports SOB associated with his cough -reports productive cough with mucous thick yellow mucous -reports decreased appetite -reports his cough is interrupting his cough -reports he took his kids prednisone last night -reports fever has resolved  - patient denies headaches, nausea, vomiting, fever, sweats, chest pain, chest pressure, palpitations, fatigue, diarrhea, constipation, dysphagia, myalgias, dizziness, leg swelling, leg pain, itchy eyes, itchy ears, heartburn, reflux or sour taste in the mouth.

## 2024-09-30 NOTE — REVIEW OF SYSTEMS
[Fatigue] : fatigue [Poor Appetite] : poor appetite [Nasal Congestion] : nasal congestion [Postnasal Drip] : postnasal drip [Cough] : cough [Chest Tightness] : chest tightness [Sputum] : sputum [Wheezing] : wheezing [SOB on Exertion] : sob on exertion [Headache] : headache [Negative] : Endocrine

## 2024-09-30 NOTE — ASSESSMENT
[FreeTextEntry1] : Mr. TAMEZ is a 49 year male with a history of chicken pox as a child, infantile PNA, OW, allergies, lactose intolerance who now comes in for an acute pulmonary evaluation for SOB, asthma, allergies, ?ELDON. Presents today with cough , wheezing and SOB  Problem 1: cough r/t ?acute asthmatic bronchitis cough r/t PND  Problem 1a: Asthmatic Bronchitis -add prednisone 20mgx7 days then 10mg x 7 days take in the morning with food -add Promethazine DM every 6 hours as needed for cough -add Zithromax 500mg QD x 7 datys  Problem 1b: PND/Nasal Congestion r/t Allergic Rhinitis -Dayquil  -add Flonase nasal spray 1 spray BID  Problem 2: Asthma -continue Ventolin inhaler as needed - rescripted -continue Symbicort 2 puffs BID - rescripted  Problem3: R/O Sleep apnea (snoring, Mallampati III, enlarged base of tongue, neck size >16) - HST ([facility]) ordered. Patient will call to  equipment.  Problem 4: CXR -order HRCT per Dr. Ybarra  Follow up appt in 4-6 months with Dr. Ybarra Encouraged to call the office with any questions or concerns.

## 2024-10-07 DIAGNOSIS — R50.9 FEVER, UNSPECIFIED: ICD-10-CM

## 2024-10-07 RX ORDER — MOMETASONE FUROATE AND FORMOTEROL FUMARATE DIHYDRATE 200; 5 UG/1; UG/1
200-5 AEROSOL RESPIRATORY (INHALATION) TWICE DAILY
Qty: 1 | Refills: 3 | Status: ACTIVE | COMMUNITY
Start: 2024-10-07 | End: 1900-01-01

## 2024-10-08 ENCOUNTER — APPOINTMENT (OUTPATIENT)
Dept: CT IMAGING | Facility: CLINIC | Age: 49
End: 2024-10-08

## 2024-10-08 ENCOUNTER — INPATIENT (INPATIENT)
Facility: HOSPITAL | Age: 49
LOS: 5 days | Discharge: HOME | End: 2024-10-14
Attending: INTERNAL MEDICINE | Admitting: INTERNAL MEDICINE
Payer: COMMERCIAL

## 2024-10-08 VITALS
SYSTOLIC BLOOD PRESSURE: 165 MMHG | WEIGHT: 229.94 LBS | DIASTOLIC BLOOD PRESSURE: 93 MMHG | OXYGEN SATURATION: 96 % | RESPIRATION RATE: 30 BRPM | HEIGHT: 73 IN | TEMPERATURE: 103 F | HEART RATE: 136 BPM

## 2024-10-08 LAB
ADD ON TEST-SPECIMEN IN LAB: SIGNIFICANT CHANGE UP
ALBUMIN SERPL ELPH-MCNC: 3.6 G/DL — SIGNIFICANT CHANGE UP (ref 3.3–5)
ALP SERPL-CCNC: 111 U/L — SIGNIFICANT CHANGE UP (ref 40–120)
ALT FLD-CCNC: 63 U/L — HIGH (ref 4–41)
ANION GAP SERPL CALC-SCNC: 15 MMOL/L — HIGH (ref 7–14)
APPEARANCE UR: CLEAR — SIGNIFICANT CHANGE UP
APTT BLD: 31.6 SEC — SIGNIFICANT CHANGE UP (ref 24.5–35.6)
AST SERPL-CCNC: 24 U/L — SIGNIFICANT CHANGE UP (ref 4–40)
BASOPHILS # BLD AUTO: 0.06 K/UL — SIGNIFICANT CHANGE UP (ref 0–0.2)
BASOPHILS NFR BLD AUTO: 0.3 % — SIGNIFICANT CHANGE UP (ref 0–2)
BILIRUB SERPL-MCNC: 0.7 MG/DL — SIGNIFICANT CHANGE UP (ref 0.2–1.2)
BILIRUB UR-MCNC: NEGATIVE — SIGNIFICANT CHANGE UP
BLOOD GAS VENOUS COMPREHENSIVE RESULT: SIGNIFICANT CHANGE UP
BUN SERPL-MCNC: 10 MG/DL — SIGNIFICANT CHANGE UP (ref 7–23)
CALCIUM SERPL-MCNC: 8.7 MG/DL — SIGNIFICANT CHANGE UP (ref 8.4–10.5)
CHLORIDE SERPL-SCNC: 99 MMOL/L — SIGNIFICANT CHANGE UP (ref 98–107)
CO2 SERPL-SCNC: 22 MMOL/L — SIGNIFICANT CHANGE UP (ref 22–31)
COLOR SPEC: YELLOW — SIGNIFICANT CHANGE UP
CREAT SERPL-MCNC: 0.78 MG/DL — SIGNIFICANT CHANGE UP (ref 0.5–1.3)
DIFF PNL FLD: NEGATIVE — SIGNIFICANT CHANGE UP
EGFR: 109 ML/MIN/1.73M2 — SIGNIFICANT CHANGE UP
EOSINOPHIL # BLD AUTO: 0.03 K/UL — SIGNIFICANT CHANGE UP (ref 0–0.5)
EOSINOPHIL NFR BLD AUTO: 0.1 % — SIGNIFICANT CHANGE UP (ref 0–6)
FLUAV AG NPH QL: SIGNIFICANT CHANGE UP
FLUBV AG NPH QL: SIGNIFICANT CHANGE UP
GAS PNL BLDV: SIGNIFICANT CHANGE UP
GLUCOSE SERPL-MCNC: 197 MG/DL — HIGH (ref 70–99)
GLUCOSE UR QL: NEGATIVE MG/DL — SIGNIFICANT CHANGE UP
HCT VFR BLD CALC: 39.8 % — SIGNIFICANT CHANGE UP (ref 39–50)
HGB BLD-MCNC: 13.3 G/DL — SIGNIFICANT CHANGE UP (ref 13–17)
IANC: 19.05 K/UL — HIGH (ref 1.8–7.4)
IMM GRANULOCYTES NFR BLD AUTO: 1.1 % — HIGH (ref 0–0.9)
INR BLD: 1.05 RATIO — SIGNIFICANT CHANGE UP (ref 0.85–1.16)
KETONES UR-MCNC: NEGATIVE MG/DL — SIGNIFICANT CHANGE UP
LACTATE SERPL-SCNC: 4.2 MMOL/L — CRITICAL HIGH (ref 0.5–2)
LEUKOCYTE ESTERASE UR-ACNC: NEGATIVE — SIGNIFICANT CHANGE UP
LYMPHOCYTES # BLD AUTO: 1.24 K/UL — SIGNIFICANT CHANGE UP (ref 1–3.3)
LYMPHOCYTES # BLD AUTO: 5.7 % — LOW (ref 13–44)
MCHC RBC-ENTMCNC: 28.7 PG — SIGNIFICANT CHANGE UP (ref 27–34)
MCHC RBC-ENTMCNC: 33.4 GM/DL — SIGNIFICANT CHANGE UP (ref 32–36)
MCV RBC AUTO: 86 FL — SIGNIFICANT CHANGE UP (ref 80–100)
MONOCYTES # BLD AUTO: 1.24 K/UL — HIGH (ref 0–0.9)
MONOCYTES NFR BLD AUTO: 5.7 % — SIGNIFICANT CHANGE UP (ref 2–14)
NEUTROPHILS # BLD AUTO: 19.05 K/UL — HIGH (ref 1.8–7.4)
NEUTROPHILS NFR BLD AUTO: 87.1 % — HIGH (ref 43–77)
NITRITE UR-MCNC: NEGATIVE — SIGNIFICANT CHANGE UP
NRBC # BLD: 0 /100 WBCS — SIGNIFICANT CHANGE UP (ref 0–0)
NRBC # FLD: 0 K/UL — SIGNIFICANT CHANGE UP (ref 0–0)
PH UR: 6.5 — SIGNIFICANT CHANGE UP (ref 5–8)
PLATELET # BLD AUTO: 347 K/UL — SIGNIFICANT CHANGE UP (ref 150–400)
POTASSIUM SERPL-MCNC: 3.8 MMOL/L — SIGNIFICANT CHANGE UP (ref 3.5–5.3)
POTASSIUM SERPL-SCNC: 3.8 MMOL/L — SIGNIFICANT CHANGE UP (ref 3.5–5.3)
PROT SERPL-MCNC: 7.5 G/DL — SIGNIFICANT CHANGE UP (ref 6–8.3)
PROT UR-MCNC: NEGATIVE MG/DL — SIGNIFICANT CHANGE UP
PROTHROM AB SERPL-ACNC: 12.5 SEC — SIGNIFICANT CHANGE UP (ref 9.9–13.4)
RBC # BLD: 4.63 M/UL — SIGNIFICANT CHANGE UP (ref 4.2–5.8)
RBC # FLD: 13 % — SIGNIFICANT CHANGE UP (ref 10.3–14.5)
RSV RNA NPH QL NAA+NON-PROBE: SIGNIFICANT CHANGE UP
SARS-COV-2 RNA SPEC QL NAA+PROBE: SIGNIFICANT CHANGE UP
SODIUM SERPL-SCNC: 136 MMOL/L — SIGNIFICANT CHANGE UP (ref 135–145)
SP GR SPEC: 1.01 — SIGNIFICANT CHANGE UP (ref 1–1.03)
UROBILINOGEN FLD QL: 0.2 MG/DL — SIGNIFICANT CHANGE UP (ref 0.2–1)
WBC # BLD: 21.87 K/UL — HIGH (ref 3.8–10.5)
WBC # FLD AUTO: 21.87 K/UL — HIGH (ref 3.8–10.5)

## 2024-10-08 PROCEDURE — 99223 1ST HOSP IP/OBS HIGH 75: CPT

## 2024-10-08 PROCEDURE — 74177 CT ABD & PELVIS W/CONTRAST: CPT | Mod: 26,MC

## 2024-10-08 PROCEDURE — 71260 CT THORAX DX C+: CPT | Mod: 26,MC

## 2024-10-08 RX ORDER — CEFEPIME 2 G/1
1000 INJECTION, POWDER, FOR SOLUTION INTRAVENOUS ONCE
Refills: 0 | Status: COMPLETED | OUTPATIENT
Start: 2024-10-08 | End: 2024-10-08

## 2024-10-08 RX ORDER — ACETAMINOPHEN 325 MG
1000 TABLET ORAL ONCE
Refills: 0 | Status: COMPLETED | OUTPATIENT
Start: 2024-10-08 | End: 2024-10-08

## 2024-10-08 RX ORDER — CEFEPIME 2 G/1
2000 INJECTION, POWDER, FOR SOLUTION INTRAVENOUS EVERY 8 HOURS
Refills: 0 | Status: DISCONTINUED | OUTPATIENT
Start: 2024-10-08 | End: 2024-10-14

## 2024-10-08 RX ORDER — BENZONATATE 150 MG/1
100 CAPSULE ORAL ONCE
Refills: 0 | Status: COMPLETED | OUTPATIENT
Start: 2024-10-08 | End: 2024-10-08

## 2024-10-08 RX ORDER — SODIUM CHLORIDE 0.9 % (FLUSH) 0.9 %
2500 SYRINGE (ML) INJECTION ONCE
Refills: 0 | Status: COMPLETED | OUTPATIENT
Start: 2024-10-08 | End: 2024-10-08

## 2024-10-08 RX ORDER — SODIUM CHLORIDE 0.9 % (FLUSH) 0.9 %
1000 SYRINGE (ML) INJECTION
Refills: 0 | Status: DISCONTINUED | OUTPATIENT
Start: 2024-10-08 | End: 2024-10-09

## 2024-10-08 RX ORDER — CEFEPIME 2 G/1
1000 INJECTION, POWDER, FOR SOLUTION INTRAVENOUS EVERY 8 HOURS
Refills: 0 | Status: DISCONTINUED | OUTPATIENT
Start: 2024-10-08 | End: 2024-10-08

## 2024-10-08 RX ADMIN — Medication 400 MILLIGRAM(S): at 22:40

## 2024-10-08 RX ADMIN — Medication 75 MILLILITER(S): at 22:40

## 2024-10-08 RX ADMIN — BENZONATATE 100 MILLIGRAM(S): 150 CAPSULE ORAL at 22:41

## 2024-10-08 RX ADMIN — Medication 2500 MILLILITER(S): at 13:33

## 2024-10-08 RX ADMIN — Medication 1000 MILLIGRAM(S): at 14:03

## 2024-10-08 RX ADMIN — CEFEPIME 100 MILLIGRAM(S): 2 INJECTION, POWDER, FOR SOLUTION INTRAVENOUS at 14:24

## 2024-10-08 RX ADMIN — Medication 400 MILLIGRAM(S): at 13:33

## 2024-10-08 RX ADMIN — CEFEPIME 100 MILLIGRAM(S): 2 INJECTION, POWDER, FOR SOLUTION INTRAVENOUS at 23:02

## 2024-10-08 NOTE — ED PROVIDER NOTE - PHYSICAL EXAMINATION
GENERAL: Not in acute distress, non-toxic appearing  HEAD: normocephalic, atraumatic  HEENT: EOMI, normal conjunctiva, oral mucosa moist, neck supple  CARDIAC: NSR, tachycardic to 130s, + 2+ pulses to radial and DP pulses bilat  PULM: clear to auscultation bilat, + dry cough, normal work of breathing  GI: abdomen nondistended, soft, nontender, no guarding or rebound tenderness  : No CVA tenderness, no suprapubic tenderness  NEURO: alert and oriented x 3, normal speech, no focal motor or sensory deficits, gait normal, no gross neurologic deficit  MSK: No visible deformities, no peripheral edema, calf tenderness/redness/swelling  SKIN: No visible rashes, dry, well-perfused  PSYCH: appropriate mood and affect

## 2024-10-08 NOTE — ED PROVIDER NOTE - OBJECTIVE STATEMENT
49 year old male with no pertinent PMH comes into the ED for eval of 3 weeks of fever, chills, body aches and cough. He states he was seen in urgent care as well as PCP. Pt had an xray that did not show any consolidation and was started on albuterol, Azithromycin and steroids without any improvement of symptoms. He was supposed to have a CT outpt today but came into the ED as his fever was not controlled with around the clock tylenol and ibuprofen. He reports pain when he coughs and feels SOB. He denies any abd pain, n/v/d, urinary symptoms.

## 2024-10-08 NOTE — ED ADULT TRIAGE NOTE - CHIEF COMPLAINT QUOTE
c/o SOB and cough for 1 month took Z pack with no relief has a CT scheduled but could no longer wait due to symptoms. denies Phx.

## 2024-10-08 NOTE — ED CDU PROVIDER INITIAL DAY NOTE - NEURO NEGATIVE STATEMENT, MLM
no loss of consciousness, no gait abnormality, no headache, no sensory deficits, and no weakness. [id number]

## 2024-10-08 NOTE — ED PROVIDER NOTE - PROGRESS NOTE DETAILS
Lyssa Rahman) [facility] PGY-3: Treated for sepsis recognized at 1406 reevaluation the patient fluid status and refill vital sign reeval at 1930 ideal body weight was utilized to determine a target body weight for ordering crystalloid fluid

## 2024-10-08 NOTE — ED ADULT NURSE NOTE - OBJECTIVE STATEMENT
BREAK RN: Pt is alert and orientedx4, ambulatory at baseline. Pt presents to the ED with flu like symptoms for 1 month. PT has had nonproductive cough (hemoptysis in morning), fever, chills and body aches. Pt denies chest pain, shortness of breath, dyspnea on exertion, breathing is unlabored and even. Pt denies nausea, vomiting or diarrhea. 20G IV placed in LAC, EKG done. labs drawn, awaiting further orders. Pt sinus tachy on HR.

## 2024-10-08 NOTE — ED ADULT NURSE NOTE - NSFALLUNIVINTERV_ED_ALL_ED
Bed/Stretcher in lowest position, wheels locked, appropriate side rails in place/Call bell, personal items and telephone in reach/Instruct patient to call for assistance before getting out of bed/chair/stretcher/Non-slip footwear applied when patient is off stretcher/[place] to call system/Physically safe environment - no spills, clutter or unnecessary equipment/Purposeful proactive rounding/Room/bathroom lighting operational, light cord in reach

## 2024-10-08 NOTE — ED ADULT NURSE NOTE - HISTORY OF COVID-19 VACCINATION
Anesthesia Evaluation     Patient summary reviewed   no history of anesthetic complications:  NPO Solid Status: > 6 hours             Airway   Mallampati: III  Dental      Pulmonary    (+) a smoker Current,   (-) sleep apnea, no home oxygen  Cardiovascular   Exercise tolerance: good (4-7 METS)    (+) hypertension,   (-) pacemaker, valvular problems/murmurs, past MI, cardiac stents, CABG      Neuro/Psych  (+) seizures (nonepileptic--last week ), psychiatric history PTSD,    GI/Hepatic/Renal/Endo    (-) no renal disease, diabetes    Musculoskeletal     Abdominal   (+) obese,    Substance History      OB/GYN          Other                        Anesthesia Plan    ASA 3     MAC     intravenous induction     Anesthetic plan, risks, benefits, and alternatives have been provided, discussed and informed consent has been obtained with: patient.        CODE STATUS:        Vaccine status unknown

## 2024-10-08 NOTE — ED CDU PROVIDER INITIAL DAY NOTE - OBJECTIVE STATEMENT
41-year-old male with no stated past medical history presenting to the ER with 3 weeks of fever, chills, body aches and cough.  Patient states that [facility] and was prescribed cough syrup, he returned 1 week later and was prescribed a Z-Harish and an albuterol inhaler.  He states he did not start the CPAP instead saw his pulmonologist and was prescribed Zithromax for 7 days as well as a prednisone taper that he is currently on day 9.  Patient states he continued to have symptoms and followed up with his primary care doctor who ordered him for CT outpatient today but given that his fever was persistent, Tmax 103 this morning he presented to the ED.  Patient also reports generalized weakness, body aches and at times shortness of breath.  Patient denies chest pain, palpitations, syncope, abdominal pain, N/V/D, urinary symptoms, recent travel or any other concerns.  In main ED patient arrives tachycardic with heart rate 130, febrile temp 103 orally, normotensive, O2 sat 96% on room air.  CBC with WBC count 21.87, CMP on actionable, initial lactate 4.6 which down trended to 2.1, CT chest showing left lower lobe pneumonia, also found to have incidental left thyroid nodule.  Patient sent to CDU for continued IV antibiotics, IV fluids, antipyretics as needed

## 2024-10-08 NOTE — ED PROVIDER NOTE - CLINICAL SUMMARY MEDICAL DECISION MAKING FREE TEXT BOX
49 year old male with no pertinent PMH comes into the ED for eval of 3 weeks of fever, chills, body aches and cough. Pt tachycardic and febrile poorly controlled with tylenol and motrin around the clock. Likely respiratory infection. Well score low for PE. Will order sepsis work up as well as CT scan with IV contrast, fluids. Dispo pending work up and reeval. Call: 49 year old male with no pertinent PMH comes into the ED for eval of 3 weeks of fever, chills, body aches and cough. Pt tachycardic and febrile poorly controlled with tylenol and motrin around the clock. Likely respiratory infection. Well score low for PE. Will order sepsis work up as well as CT scan with IV contrast, fluids. Dispo pending work up and reeval. Imaging, re eval and dispo pending at time of sign out.

## 2024-10-08 NOTE — ED CDU PROVIDER INITIAL DAY NOTE - CLINICAL SUMMARY MEDICAL DECISION MAKING FREE TEXT BOX
41-year-old male with no stated past medical history presenting to the ER with 3 weeks of fever, chills, body aches and cough.  Patient states that [facility] and was prescribed cough syrup, he returned 1 week later and was prescribed a Z-Harish and an albuterol inhaler.  He states he did not start the Z-harish instead saw his pulmonologist and was prescribed Zithromax for 7 days as well as a prednisone taper that he is currently on day 9.    In main ED patient arrives tachycardic with heart rate 130, febrile temp 103 orally, normotensive, O2 sat 96% on room air.  CBC with WBC count 21.87, CMP on actionable, initial lactate 4.6 which down trended to 2.1, CT chest showing left lower lobe pneumonia, also found to have incidental left thyroid nodule.  Patient sent to CDU for continued IV antibiotics, IV fluids, antipyretics as needed

## 2024-10-09 ENCOUNTER — RESULT REVIEW (OUTPATIENT)
Age: 49
End: 2024-10-09

## 2024-10-09 DIAGNOSIS — Z79.899 OTHER LONG TERM (CURRENT) DRUG THERAPY: ICD-10-CM

## 2024-10-09 DIAGNOSIS — A41.9 SEPSIS, UNSPECIFIED ORGANISM: ICD-10-CM

## 2024-10-09 DIAGNOSIS — J18.9 PNEUMONIA, UNSPECIFIED ORGANISM: ICD-10-CM

## 2024-10-09 DIAGNOSIS — I48.91 UNSPECIFIED ATRIAL FIBRILLATION: ICD-10-CM

## 2024-10-09 DIAGNOSIS — Z29.9 ENCOUNTER FOR PROPHYLACTIC MEASURES, UNSPECIFIED: ICD-10-CM

## 2024-10-09 DIAGNOSIS — R73.9 HYPERGLYCEMIA, UNSPECIFIED: ICD-10-CM

## 2024-10-09 LAB
A1C WITH ESTIMATED AVERAGE GLUCOSE RESULT: 5.7 % — HIGH (ref 4–5.6)
ADD ON TEST-SPECIMEN IN LAB: SIGNIFICANT CHANGE UP
ALBUMIN SERPL ELPH-MCNC: 3.1 G/DL — LOW (ref 3.3–5)
ALBUMIN SERPL ELPH-MCNC: 3.2 G/DL — LOW (ref 3.3–5)
ALP SERPL-CCNC: 105 U/L — SIGNIFICANT CHANGE UP (ref 40–120)
ALP SERPL-CCNC: 105 U/L — SIGNIFICANT CHANGE UP (ref 40–120)
ALT FLD-CCNC: 51 U/L — HIGH (ref 4–41)
ALT FLD-CCNC: 56 U/L — HIGH (ref 4–41)
ANION GAP SERPL CALC-SCNC: 10 MMOL/L — SIGNIFICANT CHANGE UP (ref 7–14)
ANION GAP SERPL CALC-SCNC: 8 MMOL/L — SIGNIFICANT CHANGE UP (ref 7–14)
APTT BLD: 37.4 SEC — HIGH (ref 24.5–35.6)
AST SERPL-CCNC: 17 U/L — SIGNIFICANT CHANGE UP (ref 4–40)
AST SERPL-CCNC: 23 U/L — SIGNIFICANT CHANGE UP (ref 4–40)
BASOPHILS # BLD AUTO: 0.05 K/UL — SIGNIFICANT CHANGE UP (ref 0–0.2)
BASOPHILS # BLD AUTO: 0.06 K/UL — SIGNIFICANT CHANGE UP (ref 0–0.2)
BASOPHILS NFR BLD AUTO: 0.2 % — SIGNIFICANT CHANGE UP (ref 0–2)
BASOPHILS NFR BLD AUTO: 0.2 % — SIGNIFICANT CHANGE UP (ref 0–2)
BILIRUB SERPL-MCNC: 0.6 MG/DL — SIGNIFICANT CHANGE UP (ref 0.2–1.2)
BILIRUB SERPL-MCNC: 0.8 MG/DL — SIGNIFICANT CHANGE UP (ref 0.2–1.2)
BLOOD GAS VENOUS COMPREHENSIVE RESULT: SIGNIFICANT CHANGE UP
BUN SERPL-MCNC: 6 MG/DL — LOW (ref 7–23)
BUN SERPL-MCNC: 8 MG/DL — SIGNIFICANT CHANGE UP (ref 7–23)
CALCIUM SERPL-MCNC: 7.1 MG/DL — LOW (ref 8.4–10.5)
CALCIUM SERPL-MCNC: 8.3 MG/DL — LOW (ref 8.4–10.5)
CHLORIDE SERPL-SCNC: 104 MMOL/L — SIGNIFICANT CHANGE UP (ref 98–107)
CHLORIDE SERPL-SCNC: 104 MMOL/L — SIGNIFICANT CHANGE UP (ref 98–107)
CHOLEST SERPL-MCNC: 162 MG/DL — SIGNIFICANT CHANGE UP
CO2 SERPL-SCNC: 21 MMOL/L — LOW (ref 22–31)
CO2 SERPL-SCNC: 21 MMOL/L — LOW (ref 22–31)
CREAT SERPL-MCNC: 0.6 MG/DL — SIGNIFICANT CHANGE UP (ref 0.5–1.3)
CREAT SERPL-MCNC: 0.61 MG/DL — SIGNIFICANT CHANGE UP (ref 0.5–1.3)
EGFR: 118 ML/MIN/1.73M2 — SIGNIFICANT CHANGE UP
EGFR: 118 ML/MIN/1.73M2 — SIGNIFICANT CHANGE UP
EOSINOPHIL # BLD AUTO: 0.02 K/UL — SIGNIFICANT CHANGE UP (ref 0–0.5)
EOSINOPHIL # BLD AUTO: 0.02 K/UL — SIGNIFICANT CHANGE UP (ref 0–0.5)
EOSINOPHIL NFR BLD AUTO: 0.1 % — SIGNIFICANT CHANGE UP (ref 0–6)
EOSINOPHIL NFR BLD AUTO: 0.1 % — SIGNIFICANT CHANGE UP (ref 0–6)
ESTIMATED AVERAGE GLUCOSE: 117 — SIGNIFICANT CHANGE UP
GLUCOSE SERPL-MCNC: 140 MG/DL — HIGH (ref 70–99)
GLUCOSE SERPL-MCNC: 175 MG/DL — HIGH (ref 70–99)
HCT VFR BLD CALC: 35.9 % — LOW (ref 39–50)
HCT VFR BLD CALC: 37.5 % — LOW (ref 39–50)
HDLC SERPL-MCNC: 45 MG/DL — SIGNIFICANT CHANGE UP
HGB BLD-MCNC: 12.1 G/DL — LOW (ref 13–17)
HGB BLD-MCNC: 12.7 G/DL — LOW (ref 13–17)
IANC: 18.71 K/UL — HIGH (ref 1.8–7.4)
IANC: 21.35 K/UL — HIGH (ref 1.8–7.4)
IMM GRANULOCYTES NFR BLD AUTO: 1 % — HIGH (ref 0–0.9)
IMM GRANULOCYTES NFR BLD AUTO: 1.1 % — HIGH (ref 0–0.9)
INR BLD: 1.03 RATIO — SIGNIFICANT CHANGE UP (ref 0.85–1.16)
LIPID PNL WITH DIRECT LDL SERPL: 98 MG/DL — SIGNIFICANT CHANGE UP
LYMPHOCYTES # BLD AUTO: 1.53 K/UL — SIGNIFICANT CHANGE UP (ref 1–3.3)
LYMPHOCYTES # BLD AUTO: 1.69 K/UL — SIGNIFICANT CHANGE UP (ref 1–3.3)
LYMPHOCYTES # BLD AUTO: 6.1 % — LOW (ref 13–44)
LYMPHOCYTES # BLD AUTO: 7.6 % — LOW (ref 13–44)
MAGNESIUM SERPL-MCNC: 1.9 MG/DL — SIGNIFICANT CHANGE UP (ref 1.6–2.6)
MAGNESIUM SERPL-MCNC: 2 MG/DL — SIGNIFICANT CHANGE UP (ref 1.6–2.6)
MCHC RBC-ENTMCNC: 28.7 PG — SIGNIFICANT CHANGE UP (ref 27–34)
MCHC RBC-ENTMCNC: 28.9 PG — SIGNIFICANT CHANGE UP (ref 27–34)
MCHC RBC-ENTMCNC: 33.7 GM/DL — SIGNIFICANT CHANGE UP (ref 32–36)
MCHC RBC-ENTMCNC: 33.9 GM/DL — SIGNIFICANT CHANGE UP (ref 32–36)
MCV RBC AUTO: 85.2 FL — SIGNIFICANT CHANGE UP (ref 80–100)
MCV RBC AUTO: 85.3 FL — SIGNIFICANT CHANGE UP (ref 80–100)
MONOCYTES # BLD AUTO: 1.56 K/UL — HIGH (ref 0–0.9)
MONOCYTES # BLD AUTO: 1.89 K/UL — HIGH (ref 0–0.9)
MONOCYTES NFR BLD AUTO: 7 % — SIGNIFICANT CHANGE UP (ref 2–14)
MONOCYTES NFR BLD AUTO: 7.5 % — SIGNIFICANT CHANGE UP (ref 2–14)
NEUTROPHILS # BLD AUTO: 18.71 K/UL — HIGH (ref 1.8–7.4)
NEUTROPHILS # BLD AUTO: 21.35 K/UL — HIGH (ref 1.8–7.4)
NEUTROPHILS NFR BLD AUTO: 84 % — HIGH (ref 43–77)
NEUTROPHILS NFR BLD AUTO: 85.1 % — HIGH (ref 43–77)
NON HDL CHOLESTEROL: 117 MG/DL — SIGNIFICANT CHANGE UP
NRBC # BLD: 0 /100 WBCS — SIGNIFICANT CHANGE UP (ref 0–0)
NRBC # BLD: 0 /100 WBCS — SIGNIFICANT CHANGE UP (ref 0–0)
NRBC # FLD: 0 K/UL — SIGNIFICANT CHANGE UP (ref 0–0)
NRBC # FLD: 0 K/UL — SIGNIFICANT CHANGE UP (ref 0–0)
PHOSPHATE SERPL-MCNC: 2.1 MG/DL — LOW (ref 2.5–4.5)
PHOSPHATE SERPL-MCNC: 2.6 MG/DL — SIGNIFICANT CHANGE UP (ref 2.5–4.5)
PLATELET # BLD AUTO: 299 K/UL — SIGNIFICANT CHANGE UP (ref 150–400)
PLATELET # BLD AUTO: 327 K/UL — SIGNIFICANT CHANGE UP (ref 150–400)
POTASSIUM SERPL-MCNC: 3.6 MMOL/L — SIGNIFICANT CHANGE UP (ref 3.5–5.3)
POTASSIUM SERPL-MCNC: 4.1 MMOL/L — SIGNIFICANT CHANGE UP (ref 3.5–5.3)
POTASSIUM SERPL-SCNC: 3.6 MMOL/L — SIGNIFICANT CHANGE UP (ref 3.5–5.3)
POTASSIUM SERPL-SCNC: 4.1 MMOL/L — SIGNIFICANT CHANGE UP (ref 3.5–5.3)
PROT SERPL-MCNC: 6.4 G/DL — SIGNIFICANT CHANGE UP (ref 6–8.3)
PROT SERPL-MCNC: 6.8 G/DL — SIGNIFICANT CHANGE UP (ref 6–8.3)
PROTHROM AB SERPL-ACNC: 12.3 SEC — SIGNIFICANT CHANGE UP (ref 9.9–13.4)
RBC # BLD: 4.21 M/UL — SIGNIFICANT CHANGE UP (ref 4.2–5.8)
RBC # BLD: 4.4 M/UL — SIGNIFICANT CHANGE UP (ref 4.2–5.8)
RBC # FLD: 13.2 % — SIGNIFICANT CHANGE UP (ref 10.3–14.5)
RBC # FLD: 13.2 % — SIGNIFICANT CHANGE UP (ref 10.3–14.5)
SODIUM SERPL-SCNC: 133 MMOL/L — LOW (ref 135–145)
SODIUM SERPL-SCNC: 135 MMOL/L — SIGNIFICANT CHANGE UP (ref 135–145)
TRIGL SERPL-MCNC: 93 MG/DL — SIGNIFICANT CHANGE UP
WBC # BLD: 22.28 K/UL — HIGH (ref 3.8–10.5)
WBC # BLD: 25.09 K/UL — HIGH (ref 3.8–10.5)
WBC # FLD AUTO: 22.28 K/UL — HIGH (ref 3.8–10.5)
WBC # FLD AUTO: 25.09 K/UL — HIGH (ref 3.8–10.5)

## 2024-10-09 PROCEDURE — 93970 EXTREMITY STUDY: CPT | Mod: 26

## 2024-10-09 PROCEDURE — 99233 SBSQ HOSP IP/OBS HIGH 50: CPT

## 2024-10-09 PROCEDURE — 93306 TTE W/DOPPLER COMPLETE: CPT | Mod: 26

## 2024-10-09 PROCEDURE — 99223 1ST HOSP IP/OBS HIGH 75: CPT

## 2024-10-09 RX ORDER — METHYLPREDNISOLONE ACETATE 80 MG/ML
40 INJECTION, SUSPENSION INTRA-ARTICULAR; INTRALESIONAL; INTRAMUSCULAR; SOFT TISSUE EVERY 8 HOURS
Refills: 0 | Status: COMPLETED | OUTPATIENT
Start: 2024-10-09 | End: 2024-10-10

## 2024-10-09 RX ORDER — SODIUM CHLORIDE 0.9 % (FLUSH) 0.9 %
1000 SYRINGE (ML) INJECTION ONCE
Refills: 0 | Status: COMPLETED | OUTPATIENT
Start: 2024-10-09 | End: 2024-10-09

## 2024-10-09 RX ORDER — VANCOMYCIN HCL-SODIUM CHLORIDE IV SOLN 1.5 GM/250ML-0.9% 1.5-0.9/25 GM/ML-%
1500 SOLUTION INTRAVENOUS EVERY 12 HOURS
Refills: 0 | Status: DISCONTINUED | OUTPATIENT
Start: 2024-10-09 | End: 2024-10-10

## 2024-10-09 RX ORDER — PREDNISONE 5 MG/1
40 TABLET ORAL DAILY
Refills: 0 | Status: COMPLETED | OUTPATIENT
Start: 2024-10-10 | End: 2024-10-12

## 2024-10-09 RX ORDER — PREDNISONE 5 MG/1
20 TABLET ORAL DAILY
Refills: 0 | Status: DISCONTINUED | OUTPATIENT
Start: 2024-10-13 | End: 2024-10-14

## 2024-10-09 RX ORDER — KETOROLAC TROMETHAMINE 10 MG/1
15 TABLET, FILM COATED ORAL ONCE
Refills: 0 | Status: DISCONTINUED | OUTPATIENT
Start: 2024-10-09 | End: 2024-10-09

## 2024-10-09 RX ORDER — SODIUM PHOSPHATE IN D5W 15MMOL/250
15 PLASTIC BAG, INJECTION (ML) INTRAVENOUS ONCE
Refills: 0 | Status: COMPLETED | OUTPATIENT
Start: 2024-10-09 | End: 2024-10-09

## 2024-10-09 RX ORDER — ENOXAPARIN SODIUM 150 MG/ML
40 INJECTION SUBCUTANEOUS EVERY 24 HOURS
Refills: 0 | Status: DISCONTINUED | OUTPATIENT
Start: 2024-10-09 | End: 2024-10-09

## 2024-10-09 RX ORDER — ACETAMINOPHEN 325 MG
650 TABLET ORAL EVERY 6 HOURS
Refills: 0 | Status: DISCONTINUED | OUTPATIENT
Start: 2024-10-09 | End: 2024-10-14

## 2024-10-09 RX ORDER — SODIUM CHLORIDE 0.9 % (FLUSH) 0.9 %
1000 SYRINGE (ML) INJECTION
Refills: 0 | Status: COMPLETED | OUTPATIENT
Start: 2024-10-09 | End: 2024-10-10

## 2024-10-09 RX ORDER — SODIUM CHLORIDE 0.9 % (FLUSH) 0.9 %
1000 SYRINGE (ML) INJECTION
Refills: 0 | Status: COMPLETED | OUTPATIENT
Start: 2024-10-09 | End: 2024-10-09

## 2024-10-09 RX ORDER — METOPROLOL TARTRATE 50 MG
25 TABLET ORAL
Refills: 0 | Status: DISCONTINUED | OUTPATIENT
Start: 2024-10-09 | End: 2024-10-10

## 2024-10-09 RX ORDER — APIXABAN 5 MG/1
5 TABLET, FILM COATED ORAL EVERY 12 HOURS
Refills: 0 | Status: DISCONTINUED | OUTPATIENT
Start: 2024-10-10 | End: 2024-10-14

## 2024-10-09 RX ORDER — KETOROLAC TROMETHAMINE 10 MG/1
15 TABLET, FILM COATED ORAL EVERY 6 HOURS
Refills: 0 | Status: DISCONTINUED | OUTPATIENT
Start: 2024-10-09 | End: 2024-10-09

## 2024-10-09 RX ORDER — DILTIAZEM HCL 300 MG
10 CAPSULE, EXTENDED RELEASE 24HR ORAL ONCE
Refills: 0 | Status: COMPLETED | OUTPATIENT
Start: 2024-10-09 | End: 2024-10-09

## 2024-10-09 RX ADMIN — ENOXAPARIN SODIUM 40 MILLIGRAM(S): 150 INJECTION SUBCUTANEOUS at 11:28

## 2024-10-09 RX ADMIN — CEFEPIME 100 MILLIGRAM(S): 2 INJECTION, POWDER, FOR SOLUTION INTRAVENOUS at 22:10

## 2024-10-09 RX ADMIN — Medication 1000 MILLILITER(S): at 09:06

## 2024-10-09 RX ADMIN — Medication 1000 MILLILITER(S): at 01:05

## 2024-10-09 RX ADMIN — Medication 80 MILLILITER(S): at 15:51

## 2024-10-09 RX ADMIN — CEFEPIME 100 MILLIGRAM(S): 2 INJECTION, POWDER, FOR SOLUTION INTRAVENOUS at 06:18

## 2024-10-09 RX ADMIN — Medication 75 MILLILITER(S): at 03:17

## 2024-10-09 RX ADMIN — METHYLPREDNISOLONE ACETATE 40 MILLIGRAM(S): 80 INJECTION, SUSPENSION INTRA-ARTICULAR; INTRALESIONAL; INTRAMUSCULAR; SOFT TISSUE at 14:34

## 2024-10-09 RX ADMIN — Medication 10 MILLIGRAM(S): at 12:09

## 2024-10-09 RX ADMIN — Medication 650 MILLIGRAM(S): at 14:50

## 2024-10-09 RX ADMIN — KETOROLAC TROMETHAMINE 15 MILLIGRAM(S): 10 TABLET, FILM COATED ORAL at 02:32

## 2024-10-09 RX ADMIN — Medication 63.75 MILLIMOLE(S): at 18:34

## 2024-10-09 RX ADMIN — KETOROLAC TROMETHAMINE 15 MILLIGRAM(S): 10 TABLET, FILM COATED ORAL at 11:29

## 2024-10-09 RX ADMIN — Medication 25 MILLIGRAM(S): at 14:35

## 2024-10-09 RX ADMIN — Medication 650 MILLIGRAM(S): at 15:54

## 2024-10-09 RX ADMIN — KETOROLAC TROMETHAMINE 15 MILLIGRAM(S): 10 TABLET, FILM COATED ORAL at 02:09

## 2024-10-09 RX ADMIN — KETOROLAC TROMETHAMINE 15 MILLIGRAM(S): 10 TABLET, FILM COATED ORAL at 04:49

## 2024-10-09 RX ADMIN — VANCOMYCIN HCL-SODIUM CHLORIDE IV SOLN 1.5 GM/250ML-0.9% 300 MILLIGRAM(S): 1.5-0.9/25 SOLUTION at 19:47

## 2024-10-09 RX ADMIN — METHYLPREDNISOLONE ACETATE 40 MILLIGRAM(S): 80 INJECTION, SUSPENSION INTRA-ARTICULAR; INTRALESIONAL; INTRAMUSCULAR; SOFT TISSUE at 22:10

## 2024-10-09 RX ADMIN — KETOROLAC TROMETHAMINE 15 MILLIGRAM(S): 10 TABLET, FILM COATED ORAL at 00:53

## 2024-10-09 RX ADMIN — Medication 1000 MILLILITER(S): at 02:00

## 2024-10-09 RX ADMIN — Medication 650 MILLIGRAM(S): at 08:01

## 2024-10-09 RX ADMIN — KETOROLAC TROMETHAMINE 15 MILLIGRAM(S): 10 TABLET, FILM COATED ORAL at 02:31

## 2024-10-09 RX ADMIN — CEFEPIME 100 MILLIGRAM(S): 2 INJECTION, POWDER, FOR SOLUTION INTRAVENOUS at 14:35

## 2024-10-09 RX ADMIN — KETOROLAC TROMETHAMINE 15 MILLIGRAM(S): 10 TABLET, FILM COATED ORAL at 12:51

## 2024-10-09 RX ADMIN — VANCOMYCIN HCL-SODIUM CHLORIDE IV SOLN 1.5 GM/250ML-0.9% 300 MILLIGRAM(S): 1.5-0.9/25 SOLUTION at 07:46

## 2024-10-09 NOTE — CONSULT NOTE ADULT - SUBJECTIVE AND OBJECTIVE BOX
CHIEF COMPLAINT:  Patient with newly diagnosed atrial fibrillation with RVR in the setting of sepsis    HISTORY OF PRESENT ILLNESS:  49 year old male with no pertinent PMH presented to the ED with fever, chills, body aches and cough. He states he was seen in urgent care as well as PCP. Patient has been on albuterol, Azithromycin and steroids without any improvementof symptoms. Patient came to ED as his fever was not controlled with Tylenol and Motrin.  He also has complaints of cough and SOB. CT scan of the chest showed left lower lobe pneumonia. Patient was also found to be in newly diagnosed atrial fibrillation with VR 120s-140s. Has received 4.5 L IVF, Vanc/Cefepime, Tylenol, and Toradol. Tmax 103. WBC 25 k.    PAST MEDICAL & SURGICAL HISTORY:  No pertinent past medical history      No significant past surgical history      PREVIOUS DIAGNOSTIC TESTING:    Echocardiogram:  Pending    MEDICATIONS:  enoxaparin Injectable 40 milliGRAM(s) SubCutaneous every 24 hours  cefepime   IVPB 2000 milliGRAM(s) IV Intermittent every 8 hours  vancomycin  IVPB 1500 milliGRAM(s) IV Intermittent every 12 hours  acetaminophen     Tablet .. 650 milliGRAM(s) Oral every 6 hours PRN  ketorolac   Injectable 15 milliGRAM(s) IV Push every 6 hours PRN  methylPREDNISolone sodium succinate Injectable 40 milliGRAM(s) IV Push every 8 hours  sodium phosphate 15 milliMole(s)/250 mL IVPB 15 milliMole(s) IV Intermittent once      FAMILY HISTORY:  None significant      SOCIAL HISTORY:      [-] Smoker  [-] Alcohol  [-] Drugs    Allergies    No Known Allergies    Intolerances    	    REVIEW OF SYSTEMS:  CONSTITUTIONAL: No fever, weight loss, or fatigue  EYES: No eye pain, visual disturbances, or discharge  ENMT:  No difficulty hearing, tinnitus, vertigo; No sinus or throat pain  NECK: No pain or stiffness  RESPIRATORY: No cough, wheezing, chills or hemoptysis; No Shortness of Breath  CARDIOVASCULAR: No chest pain, palpitations, passing out, dizziness, or leg swelling  GASTROINTESTINAL: No abdominal or epigastric pain. No nausea, vomiting, or hematemesis; No diarrhea or constipation. No melena or hematochezia.  GENITOURINARY: No dysuria, frequency, hematuria, or incontinence  NEUROLOGICAL: No headaches, memory loss, loss of strength, numbness, or tremors  SKIN: No itching, burning, rashes, or lesions   LYMPH Nodes: No enlarged glands  ENDOCRINE: No heat or cold intolerance; No hair loss  MUSCULOSKELETAL: No joint pain or swelling; No muscle, back, or extremity pain  PSYCHIATRIC: No depression, anxiety, mood swings, or difficulty sleeping  HEME/LYMPH: No easy bruising, or bleeding gums  ALLERY AND IMMUNOLOGIC: No hives or eczema	    [X] All others negative	  [ ] Unable to obtain    PHYSICAL EXAM:  T(C): 38.4 (10-09-24 @ 09:07), Max: 39.4 (10-08-24 @ 12:34)  HR: 143 (10-09-24 @ 09:07) (92 - 143)  BP: 146/70 (10-09-24 @ 09:07) (110/68 - 165/93)  RR: 18 (10-09-24 @ 09:07) (14 - 30)  SpO2: 100% (10-09-24 @ 09:07) (95% - 100%)  Wt(kg): --  I&O's Summary      Appearance: Normal	  Cardiovascular: Normal S1 S2, No JVD, No murmurs, No edema  Respiratory: Lungs clear to auscultation	  Psychiatry: A & O x 3, Mood & affect appropriate  Gastrointestinal:  Soft, Non-tender, + BS	  Skin: No rashes, No ecchymoses, No cyanosis	  Neurologic: Non-focal  Extremities: Normal range of motion, No clubbing, cyanosis or edema  Vascular: Peripheral pulses palpable 2+ bilaterally    TELEMETRY: 	    ECG:  	  RADIOLOGY:  OTHER: 	  	  LABS:	 	    CARDIAC MARKERS:                                  12.7   22.28 )-----------( 299      ( 09 Oct 2024 08:43 )             37.5     10-09    135  |  104  |  6[L]  ----------------------------<  140[H]  4.1   |  21[L]  |  0.61    Ca    8.3[L]      09 Oct 2024 08:43  Phos  2.1     10-09  Mg     1.90     10-09    TPro  6.8  /  Alb  3.2[L]  /  TBili  0.8  /  DBili  x   /  AST  17  /  ALT  51[H]  /  AlkPhos  105  10-09    proBNP:   Lipid Profile:   HgA1c:   TSH: Thyroid Stimulating Hormone, Serum: 0.80 uIU/mL (10-09 @ 01:30)           CHIEF COMPLAINT:  Patient with newly diagnosed atrial fibrillation with RVR in the setting of sepsis    HISTORY OF PRESENT ILLNESS:  49 year old male with no pertinent PMH presented to the ED with fever, chills, body aches and cough. He states he was seen in urgent care as well as PCP. Patient has been on albuterol, Azithromycin and steroids without any improvement of symptoms. Patient came to ED as his fever was not controlled with Tylenol and Motrin.  He also has complaints of cough and SOB. CT scan of the chest showed left lower lobe pneumonia. Patient was also found to be in newly diagnosed atrial fibrillation with VR 120s-140s. Has received 4.5 L IVF, Vanc/Cefepime, Tylenol, and Toradol. Tmax 103F. WBC 25 k. Review of telemetry show sinus rhythm/sinus tachycardia on admission and patient converted to atrial fibrillation with RVR after 12 am. Telemetry overnight showed  atrial fibrillation with VR as high as 180s. Telemetry now shows atrial fibrillation with VR 110s-120s. He received Cardizem 10mg IV x1. Currently patient is resting in bed and he c/o fever, intermittent chills, palpitations, cough, SOB and feeling tired. He is being treated with IV antibiotics for pneumonia.   PAST MEDICAL & SURGICAL HISTORY:  No pertinent past medical history      No significant past surgical history      PREVIOUS DIAGNOSTIC TESTING:    Echocardiogram:  Pending    MEDICATIONS:  enoxaparin Injectable 40 milliGRAM(s) SubCutaneous every 24 hours  cefepime   IVPB 2000 milliGRAM(s) IV Intermittent every 8 hours  vancomycin  IVPB 1500 milliGRAM(s) IV Intermittent every 12 hours  acetaminophen     Tablet .. 650 milliGRAM(s) Oral every 6 hours PRN  ketorolac   Injectable 15 milliGRAM(s) IV Push every 6 hours PRN  methylPREDNISolone sodium succinate Injectable 40 milliGRAM(s) IV Push every 8 hours  sodium phosphate 15 milliMole(s)/250 mL IVPB 15 milliMole(s) IV Intermittent once      FAMILY HISTORY:  None significant      SOCIAL HISTORY:      [-] Smoker  [-] Alcohol  [-] Drugs    Allergies    No Known Allergies    Intolerances    	    REVIEW OF SYSTEMS:  CONSTITUTIONAL: No fever, weight loss, or fatigue  EYES: No eye pain, visual disturbances, or discharge  ENMT:  No difficulty hearing, tinnitus, vertigo; No sinus or throat pain  NECK: No pain or stiffness  RESPIRATORY: No cough, wheezing, chills or hemoptysis; No Shortness of Breath  CARDIOVASCULAR: No chest pain, palpitations, passing out, dizziness, or leg swelling  GASTROINTESTINAL: No abdominal or epigastric pain. No nausea, vomiting, or hematemesis; No diarrhea or constipation. No melena or hematochezia.  GENITOURINARY: No dysuria, frequency, hematuria, or incontinence  NEUROLOGICAL: No headaches, memory loss, loss of strength, numbness, or tremors  SKIN: No itching, burning, rashes, or lesions   LYMPH Nodes: No enlarged glands  ENDOCRINE: No heat or cold intolerance; No hair loss  MUSCULOSKELETAL: No joint pain or swelling; No muscle, back, or extremity pain  PSYCHIATRIC: No depression, anxiety, mood swings, or difficulty sleeping  HEME/LYMPH: No easy bruising, or bleeding gums  ALLERY AND IMMUNOLOGIC: No hives or eczema	    [X] All others negative	  [ ] Unable to obtain    PHYSICAL EXAM:  T(C): 38.4 (10-09-24 @ 09:07), Max: 39.4 (10-08-24 @ 12:34)  HR: 143 (10-09-24 @ 09:07) (92 - 143)  BP: 146/70 (10-09-24 @ 09:07) (110/68 - 165/93)  RR: 18 (10-09-24 @ 09:07) (14 - 30)  SpO2: 100% (10-09-24 @ 09:07) (95% - 100%)  Wt(kg): --  I&O's Summary      Appearance: Normal	  Cardiovascular: Irregular rate and rhythm  Respiratory: Lungs clear to auscultation	  Psychiatry: A & O x 3, Mood & affect appropriate  Gastrointestinal:  Soft, Non-tender, + BS	  Skin: No rashes, No ecchymoses, No cyanosis	  Neurologic: Non-focal  Extremities: Normal range of motion, No clubbing, cyanosis or edema    TELEMETRY: Sinus rhythm with HR 90s-100s; atrial fibrillation with VR 100s-180s	    ECG: 10/8/24 12;45 am:  Sinus rhythm with  bpm; SONIA 152 ms; Qrs 72 ms; Qtc 427 ms;   Atrial fibrillation with VR 133bpm; Qrs 74 ms; 10/9/24 1:12 am        	  RADIOLOGY:  OTHER: 	  	  LABS:	 	    CARDIAC MARKERS:                      12.7   22.28 )-----------( 299      ( 09 Oct 2024 08:43 )             37.5     10-09    135  |  104  |  6[L]  ----------------------------<  140[H]  4.1   |  21[L]  |  0.61    Ca    8.3[L]      09 Oct 2024 08:43  Phos  2.1     10-09  Mg     1.90     10-09    TPro  6.8  /  Alb  3.2[L]  /  TBili  0.8  /  DBili  x   /  AST  17  /  ALT  51[H]  /  AlkPhos  105  10-09    TSH: Thyroid Stimulating Hormone, Serum: 0.80 uIU/mL (10-09 @ 01:30)           CHIEF COMPLAINT:  Patient with newly diagnosed atrial fibrillation with RVR in the setting of sepsis    HISTORY OF PRESENT ILLNESS:  49 year old male with no pertinent PMH presented to the ED with fever, chills, body aches and cough. He states he was seen in urgent care as well as PCP. Patient has been on albuterol, Azithromycin and steroids without any improvement of symptoms. Patient came to ED as his fever was not controlled with Tylenol and Motrin.  He also has complaints of cough and SOB. CT scan of the chest showed left lower lobe pneumonia. Patient was also found to be in newly diagnosed atrial fibrillation with VR 120s-140s. Has received 4.5 L IVF, Vanc/Cefepime, Tylenol, and Toradol. Tmax 103F. WBC 25 k. Review of telemetry show sinus rhythm/sinus tachycardia on admission and patient converted to atrial fibrillation with RVR after 12 am. Telemetry overnight showed  atrial fibrillation with VR as high as 180s. Telemetry now shows atrial fibrillation with VR 110s-120s. He received Cardizem 10mg IV x1. Currently patient is resting in bed and he c/o fever, intermittent chills, palpitations, cough, SOB and feeling tired. He is being treated with IV antibiotics for pneumonia.   PAST MEDICAL & SURGICAL HISTORY:  No pertinent past medical history      No significant past surgical history      PREVIOUS DIAGNOSTIC TESTING:    Echocardiogram:  Pending    MEDICATIONS:  enoxaparin Injectable 40 milliGRAM(s) SubCutaneous every 24 hours  cefepime   IVPB 2000 milliGRAM(s) IV Intermittent every 8 hours  vancomycin  IVPB 1500 milliGRAM(s) IV Intermittent every 12 hours  acetaminophen     Tablet .. 650 milliGRAM(s) Oral every 6 hours PRN  ketorolac   Injectable 15 milliGRAM(s) IV Push every 6 hours PRN  methylPREDNISolone sodium succinate Injectable 40 milliGRAM(s) IV Push every 8 hours  sodium phosphate 15 milliMole(s)/250 mL IVPB 15 milliMole(s) IV Intermittent once      FAMILY HISTORY:  None significant      SOCIAL HISTORY:      [-] Smoker  [-] Alcohol  [-] Drugs    Allergies    No Known Allergies    Intolerances    	    REVIEW OF SYSTEMS:  CONSTITUTIONAL: No fever, weight loss, + fatigue  EYES: No eye pain, visual disturbances, or discharge  ENMT:  No difficulty hearing, tinnitus, vertigo; No sinus or throat pain  NECK: No pain or stiffness  RESPIRATORY: No cough, wheezing, chills or hemoptysis; + Shortness of Breath  CARDIOVASCULAR: No chest pain, passing out, dizziness, or leg swelling, + palpitations  GASTROINTESTINAL: No abdominal or epigastric pain. No nausea, vomiting, or hematemesis; No diarrhea or constipation. No melena or hematochezia.  GENITOURINARY: No dysuria, frequency, hematuria, or incontinence  NEUROLOGICAL: No headaches, memory loss, loss of strength, numbness, or tremors  SKIN: No itching, burning, rashes, or lesions   LYMPH Nodes: No enlarged glands  ENDOCRINE: No heat or cold intolerance; No hair loss  MUSCULOSKELETAL: No joint pain or swelling; No muscle, back, or extremity pain  PSYCHIATRIC: No depression, anxiety, mood swings, or difficulty sleeping  HEME/LYMPH: No easy bruising, or bleeding gums  ALLERY AND IMMUNOLOGIC: No hives or eczema	    [X] All others negative	  [ ] Unable to obtain    PHYSICAL EXAM:  T(C): 38.4 (10-09-24 @ 09:07), Max: 39.4 (10-08-24 @ 12:34)  HR: 143 (10-09-24 @ 09:07) (92 - 143)  BP: 146/70 (10-09-24 @ 09:07) (110/68 - 165/93)  RR: 18 (10-09-24 @ 09:07) (14 - 30)  SpO2: 100% (10-09-24 @ 09:07) (95% - 100%)  Wt(kg): --  I&O's Summary      Appearance: Normal	  Cardiovascular: Irregular rate and rhythm  Respiratory: Lungs clear to auscultation	  Psychiatry: A & O x 3, Mood & affect appropriate  Gastrointestinal:  Soft, Non-tender, + BS	  Skin: No rashes, No ecchymoses, No cyanosis	  Neurologic: Non-focal  Extremities: Normal range of motion, No clubbing, cyanosis or edema    TELEMETRY: Sinus rhythm with HR 90s-100s; atrial fibrillation with VR 100s-180s	    ECG: 10/8/24 12;45 am:  Sinus rhythm with  bpm; SONIA 152 ms; Qrs 72 ms; Qtc 427 ms;   Atrial fibrillation with VR 133bpm; Qrs 74 ms; 10/9/24 1:12 am        	  RADIOLOGY:  OTHER: 	  	  LABS:	 	    CARDIAC MARKERS:                      12.7   22.28 )-----------( 299      ( 09 Oct 2024 08:43 )             37.5     10-09    135  |  104  |  6[L]  ----------------------------<  140[H]  4.1   |  21[L]  |  0.61    Ca    8.3[L]      09 Oct 2024 08:43  Phos  2.1     10-09  Mg     1.90     10-09    TPro  6.8  /  Alb  3.2[L]  /  TBili  0.8  /  DBili  x   /  AST  17  /  ALT  51[H]  /  AlkPhos  105  10-09    TSH: Thyroid Stimulating Hormone, Serum: 0.80 uIU/mL (10-09 @ 01:30)

## 2024-10-09 NOTE — CONSULT NOTE ADULT - SUBJECTIVE AND OBJECTIVE BOX
[facility], Division of Infectious Diseases  BAYRON Mullins S. Shah, IHSMAEL Michele Cox [place]  [phone]    [facility], AMADOU  49y, Male  [id number]    HPI--  HPI:  48 yo m with no reported PMH p/w fever, cough for last 2-3 weeks. Completed 7 day course azithromycin, and currently on steroid course (day 9/14) with no symptomatic improvement. Found to be in new-onset rapid afib; currently ranging from 120s-140s. Has received 4.5 L IVF, vanc/cefepime, tylenol, Toradol Pt also ebdorses some anxiety.  Home tmax 103. wbc 25 k. CT chest IV  + for LLL pna, 1.7cm left thyroid nodule. TSH, T3, FT4 wnl   Pt with no cardiac history no h/o htn, stroke or dm.   Notes mild covid 2 months ago  Mini rvp negative (09 Oct 2024 06:17)    ID c/s for further evaluation    Pt reporting that he has been ill x3 wks  Reporting possible enterovirus/rhinovirus as a component of illness that was dx as an outpatient  Also reporting whole family is feeling unwell      Active Medications--  acetaminophen     Tablet .. 650 milliGRAM(s) Oral every 6 hours PRN  cefepime   IVPB 2000 milliGRAM(s) IV Intermittent every 8 hours  enoxaparin Injectable 40 milliGRAM(s) SubCutaneous every 24 hours  ketorolac   Injectable 15 milliGRAM(s) IV Push every 6 hours PRN  methylPREDNISolone sodium succinate Injectable 40 milliGRAM(s) IV Push every 8 hours  sodium phosphate 15 milliMole(s)/250 mL IVPB 15 milliMole(s) IV Intermittent once  vancomycin  IVPB 1500 milliGRAM(s) IV Intermittent every 12 hours    Antimicrobials:   cefepime   IVPB 2000 milliGRAM(s) IV Intermittent every 8 hours  vancomycin  IVPB 1500 milliGRAM(s) IV Intermittent every 12 hours    Immunologic:     ROS:  CONSTITUTIONAL: No fevers or chills. No weakness or headache. No weight changes.  EYES/ENT: No visual or hearing changes. No sore throat or throat pain .  NECK: No pain or stiffness  RESPIRATORY: No cough, wheezing, or hemoptysis. No shortness of breath  CARDIOVASCULAR: No chest pain or palpitations  GASTROINTESTINAL: No abdominal pain. No nausea or vomiting. No diarrhea or constipation.  GENITOURINARY: No dysuria, frequency or hematuria  NEUROLOGICAL: No numbness or weakness  SKIN: No itching or rashes  PSYCHIATRIC: Pleasant. Appropriate affect    Allergies: No Known Allergies    PMH -- No pertinent past medical history      PSH -- No significant past surgical history      FH --   Social History --  EtOH: denies   Tobacco: denies   Drug Use: denies     Travel/Environmental/Occupational History:    Physical Exam--  Vital Signs Last 24 Hrs  T(F): 99 (09 Oct 2024 11:20), Max: 101.8 (09 Oct 2024 02:25)  HR: 115 (09 Oct 2024 12:18) (92 - 143)  BP: 106/66 (09 Oct 2024 12:18) (106/66 - 157/89)  RR: 17 (09 Oct 2024 11:20) (14 - 28)  SpO2: 100% (09 Oct 2024 11:20) (95% - 100%)  General: nontoxic-appearing, no acute distress  HEENT: NC/AT, EOMI,  Lungs: b/l rales  Heart: RRR  Abdomen: Soft. Nondistended. Nontender.  Extremities: No cyanosis or clubbing. No edema.   Skin: Warm. Dry. Good turgor.     Laboratory & Imaging Data:  CBC:                       12.7   22.28 )-----------( 299      ( 09 Oct 2024 08:43 )             37.5     CMP: 10-09    135  |  104  |  6[L]  ----------------------------<  140[H]  4.1   |  21[L]  |  0.61    Ca    8.3[L]      09 Oct 2024 08:43  Phos  2.1     10-09  Mg     1.90     10-09    TPro  6.8  /  Alb  3.2[L]  /  TBili  0.8  /  DBili  x   /  AST  17  /  ALT  51[H]  /  AlkPhos  105  10-09    LIVER FUNCTIONS - ( 09 Oct 2024 08:43 )  Alb: 3.2 g/dL / Pro: 6.8 g/dL / ALK PHOS: 105 U/L / ALT: 51 U/L / AST: 17 U/L / GGT: x           Urinalysis Basic - ( 09 Oct 2024 08:43 )    Color: x / Appearance: x / SG: x / pH: x  Gluc: 140 mg/dL / Ketone: x  / Bili: x / Urobili: x   Blood: x / Protein: x / Nitrite: x   Leuk Esterase: x / RBC: x / WBC x   Sq Epi: x / Non Sq Epi: x / Bacteria: x        Microbiology: reviewed    Urinalysis with Rflx Culture (collected 10-08-24 @ 16:33)          Radiology: reviewed    < from: CT Abdomen and Pelvis w/ IV Cont (10.08.24 @ 17:19) >    ACC: [id number] EXAM:  CT CHEST IC   ORDERED BY: SERENA CORNELL     ACC: [id number] EXAM:  CT ABDOMEN AND PELVIS IC   ORDERED BY: SERENA CORNELL     PROCEDURE DATE:  10/08/2024          INTERPRETATION:  CLINICAL INFORMATION: Fever.    COMPARISON: MR abdomen 11/3/2022. CT abdomen pelvis 10/28/2022.    CONTRAST/COMPLICATIONS:  IV Contrast: Omnipaque 350   90 cc administered   10 cc discarded  Oral Contrast: NONE  Complications: None reported at time of study completion    PROCEDURE:  CT of the Chest, Abdomenand Pelvis was performed.  Sagittal and coronal reformats were performed.    FINDINGS:  CHEST:  LUNGS AND LARGE AIRWAYS: Patent central airways. Patchy left lower lobe   tree-in-bud and consolidative opacities.  PLEURA: No pleural effusion.  VESSELS:Within normal limits.  HEART: Heart size is normal. No pericardial effusion.  MEDIASTINUM AND SUPA: No lymphadenopathy.  CHEST WALL AND LOWER NECK: A left heterogeneous thyroid nodule measuring   1.7 cm.    ABDOMEN AND PELVIS:  LIVER: Steatosis.  BILE DUCTS: Normal caliber.  GALLBLADDER: Cholecystectomy.  SPLEEN: Within normal limits.  PANCREAS: Within normal limits.  ADRENALS: Within normal limits.  KIDNEYS/URETERS: Few right renal subcentimeter hypodense foci, too small   to characterize. Stable 0.9 cm right renal upper pole lesion previously   identified as hemorrhagic cyst. No hydronephrosis. Symmetric renal   enhancement.    BLADDER: Within normal limits.  REPRODUCTIVE ORGANS: Prostate within normal limits.    BOWEL: No bowel obstruction. Appendix is normal.  PERITONEUM/RETROPERITONEUM: Within normal limits.  VESSELS: Within normal limits.  LYMPH NODES: No lymphadenopathy.  ABDOMINAL WALL: Within normal limits.  BONES: Within normal limits.    IMPRESSION:  Left lower lobe pneumonia.  A heterogeneous left thyroid nodule measuring 1.7 cm. Recommend dedicated   sonogram.        --- End of Report ---            BISHNU MARCIAL MD; Attending Radiologist  This document has been electronically signed. Oct  8 2024  5:59PM    < end of copied text >

## 2024-10-09 NOTE — H&P ADULT - PROBLEM SELECTOR PLAN 4
- hyperglycemia can be explained by current steroid use  -a1c ordered,   -leukocytosis likely attributable to both steroids use and sepsis; continue to trend

## 2024-10-09 NOTE — PROGRESS NOTE ADULT - SUBJECTIVE AND OBJECTIVE BOX
SUBJECTIVE / OVERNIGHT EVENTS:pt seen and examined    MEDICATIONS  (STANDING):  apixaban 5 milliGRAM(s) Oral every 12 hours  cefepime   IVPB 2000 milliGRAM(s) IV Intermittent every 8 hours  metoprolol tartrate 25 milliGRAM(s) Oral two times a day  predniSONE   Tablet 40 milliGRAM(s) Oral daily  sodium chloride 0.9%. 1000 milliLiter(s) (80 mL/Hr) IV Continuous <Continuous>  vancomycin  IVPB 1500 milliGRAM(s) IV Intermittent every 12 hours    MEDICATIONS  (PRN):  acetaminophen     Tablet .. 650 milliGRAM(s) Oral every 6 hours PRN Temp greater or equal to 38C (100.4F), Mild Pain (1 - 3)  ketorolac   Injectable 15 milliGRAM(s) IV Push every 6 hours PRN Mild Pain (1 - 3)    T(C): 36.7 (10-10-24 @ 05:58), Max: 38.4 (10-09-24 @ 09:07)  HR: 114 (10-10-24 @ 05:58) (83 - 143)  BP: 119/75 (10-10-24 @ 05:58) (106/66 - 146/70)  RR: 18 (10-10-24 @ 05:58) (17 - 18)  SpO2: 99% (10-10-24 @ 05:58) (98% - 100%)    CAPILLARY BLOOD GLUCOSE        I&O's Summary      Constitutional: No fever, fatigue  Skin: No rash.  Eyes: No recent vision problems or eye pain.  ENT: No congestion, ear pain, or sore throat.  Cardiovascular: No chest pain or palpation.  Respiratory: No cough, shortness of breath, congestion, or wheezing.  Gastrointestinal: No abdominal pain, nausea, vomiting, or diarrhea.  Genitourinary: No dysuria.  Musculoskeletal: No joint swelling.  Neurologic: No headache.    PHYSICAL EXAM:  GENERAL: NAD  EYES: EOMI, PERRLA  NECK: Supple, No JVD  CHEST/LUNG: dec breath sounds at bases  HEART:  S1 , S2 +  ABDOMEN: soft , bs+  EXTREMITIES:  [place]  NEUROLOGY:alert awake      LABS:                        12.1   18.65 )-----------( 374      ( 10 Oct 2024 05:01 )             36.1     10-10    136  |  105  |  6[L]  ----------------------------<  184[H]  4.1   |  19[L]  |  0.58    Ca    8.7      10 Oct 2024 05:01  Phos  2.5     10-10  Mg     2.30     10-10    TPro  6.8  /  Alb  3.2[L]  /  TBili  0.8  /  DBili  x   /  AST  17  /  ALT  51[H]  /  AlkPhos  105  10-09    PT/INR - ( 09 Oct 2024 08:43 )   PT: 12.3 sec;   INR: 1.03 ratio         PTT - ( 09 Oct 2024 08:43 )  PTT:37.4 sec      Urinalysis Basic - ( 10 Oct 2024 05:01 )    Color: x / Appearance: x / SG: x / pH: x  Gluc: 184 mg/dL / Ketone: x  / Bili: x / Urobili: x   Blood: x / Protein: x / Nitrite: x   Leuk Esterase: x / RBC: x / WBC x   Sq Epi: x / Non Sq Epi: x / Bacteria: x        RADIOLOGY & ADDITIONAL TESTS:    Imaging Personally Reviewed:    Consultant(s) Notes Reviewed:      Care Discussed with Consultants/Other Providers:

## 2024-10-09 NOTE — CONSULT NOTE ADULT - SUBJECTIVE AND OBJECTIVE BOX
PATIENT SEEN AND EXAMINED BY ALMAS HASSAN M.D. ON :- 10/9/24  DATE OF SERVICE:   10/9/24          Interim events noted,Labs ,Radiological studies and Cardiology tests reviewed .    Patient is a 49y old  Male who presents with a chief complaint of sepsis 2/2 pna (09 Oct 2024 13:40)      HPI:  50 yo m with no reported PMH p/w fever, cough for last 2-3 weeks. Completed 7 day course azithromycin, and currently on steroid course (day 9/14) with no symptomatic improvement. Found to be in new-onset rapid afib; currently ranging from 120s-140s. Has received 4.5 L IVF, vanc/cefepime, tylenol, Toradol Pt also ebdorses some anxiety.  Home tmax 103. wbc 25 k. CT chest IV  + for LLL pna, 1.7cm left thyroid nodule. TSH, T3, FT4 wnl   Pt with no cardiac history no h/o htn, stroke or dm.   Notes mild covid 2 months ago  Mini rvp negative (09 Oct 2024 06:17)      PAST MEDICAL & SURGICAL HISTORY:  No pertinent past medical history      No significant past surgical history          PREVIOUS DIAGNOSTIC TESTING:      ECHO  FINDINGS:    STRESS  FINDINGS:    CATHETERIZATION  FINDINGS:    MEDICATIONS  (STANDING):  cefepime   IVPB 2000 milliGRAM(s) IV Intermittent every 8 hours  methylPREDNISolone sodium succinate Injectable 40 milliGRAM(s) IV Push every 8 hours  metoprolol tartrate 25 milliGRAM(s) Oral two times a day  sodium chloride 0.9%. 1000 milliLiter(s) (80 mL/Hr) IV Continuous <Continuous>  vancomycin  IVPB 1500 milliGRAM(s) IV Intermittent every 12 hours    MEDICATIONS  (PRN):  acetaminophen     Tablet .. 650 milliGRAM(s) Oral every 6 hours PRN Temp greater or equal to 38C (100.4F), Mild Pain (1 - 3)  ketorolac   Injectable 15 milliGRAM(s) IV Push every 6 hours PRN Mild Pain (1 - 3)      FAMILY HISTORY:      SOCIAL HISTORY:    CIGARETTES:    ALCOHOL:    REVIEW OF SYSTEMS:  CONSTITUTIONAL: No fever, weight loss, or fatigue  EYES: No eye pain, visual disturbances, or discharge  ENMT:  No difficulty hearing, tinnitus, vertigo; No sinus or throat pain  NECK: No pain or stiffness  RESPIRATORY: No cough, wheezing, chills or hemoptysis; No shortness of breath  CARDIOVASCULAR: No chest pain, palpitations, dizziness, or leg swelling  GASTROINTESTINAL: No abdominal or epigastric pain. No nausea, vomiting, or hematemesis; No diarrhea or constipation. No melena or hematochezia.  GENITOURINARY: No dysuria, frequency, hematuria, or incontinence  NEUROLOGICAL: No headaches, memory loss, loss of strength, numbness, or tremors  SKIN: No itching, burning, rashes, or lesions   LYMPH NODES: No enlarged glands  ENDOCRINE: No heat or cold intolerance; No hair loss  MUSCULOSKELETAL: No joint pain or swelling; No muscle, back, or extremity pain  PSYCHIATRIC: No depression, anxiety, mood swings, or difficulty sleeping  HEME/LYMPH: No easy bruising, or bleeding gums  ALLERY AND IMMUNOLOGIC: No hives or eczema    Vital Signs Last 24 Hrs  T(C): 36.7 (09 Oct 2024 21:40), Max: 38.8 (09 Oct 2024 02:25)  T(F): 98 (09 Oct 2024 21:40), Max: 101.8 (09 Oct 2024 02:25)  HR: 83 (09 Oct 2024 21:40) (83 - 143)  BP: 130/82 (09 Oct 2024 21:40) (106/66 - 146/70)  BP(mean): --  RR: 18 (09 Oct 2024 21:40) (14 - 18)  SpO2: 98% (09 Oct 2024 21:40) (95% - 100%)    Parameters below as of 09 Oct 2024 21:40  Patient On (Oxygen Delivery Method): room air          PHYSICAL EXAM:  GENERAL: NAD, well-groomed, well-developed  HEAD:  Atraumatic, Normocephalic  EYES: EOMI, PERRLA, conjunctiva and sclera clear  ENMT: No tonsillar erythema, exudates, or enlargement; Moist mucous membranes, Good dentition, No lesions  NECK: Supple, No JVD, Normal thyroid  NERVOUS SYSTEM:  Alert & Oriented X3, Good concentration; Motor Strength 5/5 B/L upper and lower extremities; DTRs 2+ intact and symmetric  CHEST/LUNG: Clear to percussion bilaterally; No rales, rhonchi, wheezing, or rubs  HEART: Regular rate and rhythm; No murmurs, rubs, or gallops  ABDOMEN: Soft, Nontender, Nondistended; Bowel sounds present  EXTREMITIES:  2+ Peripheral Pulses, No clubbing, cyanosis, or edema  LYMPH: No lymphadenopathy noted  SKIN: No rashes or lesions      INTERPRETATION OF TELEMETRY:    ECG:    CHADSVASC:     LABS:                        12.7   22.28 )-----------( 299      ( 09 Oct 2024 08:43 )             37.5     10-09    135  |  104  |  6[L]  ----------------------------<  140[H]  4.1   |  21[L]  |  0.61    Ca    8.3[L]      09 Oct 2024 08:43  Phos  2.6     10-09  Mg     2.00     10-09    TPro  6.8  /  Alb  3.2[L]  /  TBili  0.8  /  DBili  x   /  AST  17  /  ALT  51[H]  /  AlkPhos  105  10-09        PT/INR - ( 09 Oct 2024 08:43 )   PT: 12.3 sec;   INR: 1.03 ratio         PTT - ( 09 Oct 2024 08:43 )  PTT:37.4 sec  Urinalysis Basic - ( 09 Oct 2024 08:43 )    Color: x / Appearance: x / SG: x / pH: x  Gluc: 140 mg/dL / Ketone: x  / Bili: x / Urobili: x   Blood: x / Protein: x / Nitrite: x   Leuk Esterase: x / RBC: x / WBC x   Sq Epi: x / Non Sq Epi: x / Bacteria: x      Lipid Panel: Cholesterol, Serum 162  Direct LDL --  HDL Cholesterol, Serum 45  Triglycerides, Serum 93    I&O's Summary      RADIOLOGY & ADDITIONAL STUDIES:    CONCLUSIONS:      1. Left ventricular cavity is normal in size. Left ventricular wall thickness is normal. Left ventricular systolic function is normal with an ejection fraction of 61 % by Jaquez's method of disks. There are no regional wall motion abnormalities seen.   2. Normal right ventricular cavity size and normal right ventricular systolic function. Tricuspid annular plane systolic excursion (TAPSE) is 2.2 cm (normal >=1.7 cm).   3. Structurally normal mitral valve with normal leaflet excursion. There is trace mitral regurgitation.

## 2024-10-09 NOTE — H&P ADULT - NSHPREVIEWOFSYSTEMS_GEN_ALL_CORE
Review of Systems:   CONSTITUTIONAL: + fever,  fatigue  EYES: No eye pain, visual disturbances, or discharge  ENMT:  No difficulty hearing, tinnitus, vertigo; No sinus or throat pain  NECK: No pain or stiffness  RESPIRATORY: +cough,  chills   CARDIOVASCULAR: No chest pain, palpitations, dizziness, or leg swelling  GASTROINTESTINAL: No abdominal or epigastric pain. No nausea, vomiting, or hematemesis; No diarrhea or constipation. No melena or hematochezia.  GENITOURINARY: No dysuria  NEUROLOGICAL: No headaches  SKIN: No itching, burning, rashes  MUSCULOSKELETAL: No joint pain or swelling; No muscle, back, or extremity pain  PSYCHIATRIC: mild anxiety

## 2024-10-09 NOTE — H&P ADULT - NSHPLABSRESULTS_GEN_ALL_CORE
12.1   25.09 )-----------( 327      ( 09 Oct 2024 01:30 )             35.9     10-09    133[L]  |  104  |  8   ----------------------------<  175[H]  3.6   |  21[L]  |  0.60    Ca    7.1[L]      09 Oct 2024 01:30  Phos  2.1     10-09  Mg     1.90     10-09    TPro  6.4  /  Alb  3.1[L]  /  TBili  0.6  /  DBili  x   /  AST  23  /  ALT  56[H]  /  AlkPhos  105  10-09    CAPILLARY BLOOD GLUCOSE        PT/INR - ( 08 Oct 2024 13:23 )   PT: 12.5 sec;   INR: 1.05 ratio         PTT - ( 08 Oct 2024 13:23 )  PTT:31.6 sec  Urinalysis Basic - ( 09 Oct 2024 01:30 )    Color: x / Appearance: x / SG: x / pH: x  Gluc: 175 mg/dL / Ketone: x  / Bili: x / Urobili: x   Blood: x / Protein: x / Nitrite: x   Leuk Esterase: x / RBC: x / WBC x   Sq Epi: x / Non Sq Epi: x / Bacteria: x      Vital Signs Last 24 Hrs  T(C): 37 (09 Oct 2024 05:01), Max: 39.4 (08 Oct 2024 12:34)  T(F): 98.6 (09 Oct 2024 05:01), Max: 103 (08 Oct 2024 12:34)  HR: 138 (09 Oct 2024 05:01) (92 - 141)  BP: 124/98 (09 Oct 2024 05:01) (110/68 - 165/93)  BP(mean): --  RR: 18 (09 Oct 2024 05:01) (14 - 30)  SpO2: 100% (09 Oct 2024 05:01) (95% - 100%)    Parameters below as of 09 Oct 2024 05:01  Patient On (Oxygen Delivery Method): room air

## 2024-10-09 NOTE — ED CDU PROVIDER SUBSEQUENT DAY NOTE - NS ED ATTENDING STATEMENT MOD
[facility]    BH Treatment Plan Acknowledgement    Nicholas Washington was involved in the treatment plan for this current admission, 10/18/2023.  Patient has seen and agrees to the Interdisciplinary Treatment Plan.  Nicholas Washington verbalizes that he/she will work with the treatment team to meet the Interdisciplinary Treatment Plan goals.    X____________________________________    Date/Time: _____________________    Patient/Legally Authorized Representative  X____________________________________    Date/Time: _____________________    Treatment Team Member   X____________________________________    Date/Time: _____________________       X____________________________________    Date/Time: _____________________    Patient/Legally Authorized Representative  X____________________________________    Date/Time: _____________________    Treatment Team Member  X____________________________________    Date/Time: _____________________       X____________________________________    Date/Time: _____________________    Patient/Legally Authorized Representative  X____________________________________    Date/Time: _____________________    Treatment Team Member  X____________________________________    Date/Time: _____________________         I have reviewed and agree with the proposed treatment plan that the treatment team and the patient have defined.    X____________________________________    Date/Time: _____________________    MD Signature (for Inpatient/Residential encounters only)    I was notified of my drug test results.    X____________________________________    Date/Time: _____________________    Patient/Legally Authorized Representative  X____________________________________    Date/Time: _____________________    Patient/Legally Authorized Representative    [id number]     This was a shared visit with the JYOTI. I reviewed and verified the documentation.

## 2024-10-09 NOTE — CONSULT NOTE ADULT - TIME BILLING
- Review of records, telemetry, vital signs and daily labs.   - General and cardiovascular physical examination.  - Generation of cardiovascular treatment plan and completion of note .  - Coordination of care.      Patient was seen and examined by me on 10/9/24 ,interim events noted,labs and radiology studies reviewed.  Yaniv Estrella MD,FACC.  [id number] [id number].  [phone]

## 2024-10-09 NOTE — H&P ADULT - PROBLEM SELECTOR PLAN 1
-c/w vanc, cefepime, fluid resuscitation  -follow cultures  -will dose 40 IV solumedrol x3, c/w prednisone 40mg daily tomorrow

## 2024-10-09 NOTE — CONSULT NOTE ADULT - ASSESSMENT
49 year old male with no pertinent PMH presented to the ED with fever, chills, body aches and cough. He is being treated  49 year old male with no pertinent PMH presented to the ED with fever, chills, body aches and cough. He is being treated for LL lobe PNA. Patient was also found to be in newly diagnosed atrial fibrillation with VR 120s-140s. Has received 4.5 L IVF, Vanc/Cefepime, Tylenol, and Toradol. Tmax 103F. WBC 25 k. Review of telemetry show sinus rhythm/sinus tachycardia on admission and patient converted to atrial fibrillation with RVR. Telemetry overnight showed  atrial fibrillation with VR as high as 180s in the setting of PNA, fever. Telemetry now shows atrial fibrillation with VR 110s-120s. On IV antibiotics for PNA. TSH 0.80 uIU/mL    - Continue to monitor on telemetry  - Maintain K>4 and Mg>2  - Start metoprolol 25 mg BID  - Start Eliquis 5 mg BID, [id number] score= 0  - Echocardiogram to evaluate LV function and valve function  - Will plan for HÉCTOR/DCCV when medically stable  - Continue management as per primary team

## 2024-10-09 NOTE — CONSULT NOTE ADULT - ASSESSMENT
Pt is a 49M w/ PMHx of fever, cough x2-3 wks. s/p azithromycin x7 day course, w/o improvement now on steroid course w/o sx improvement.  eporting possible enterovirus/rhinovirus as a component of illness that was dx as an outpatient. Wife and both daughters also sick    Sepsis 2/2 PNA  Afib 2/2 acute infection  Now found to be in new-onset rapid afib; currently ranging from 120s-140s. Also reporting Febrile at home  Leukocytosis to 25  CT chest w/ LLL PNA  S/p vancomycin, cefepime, fluids    Recommendations:   C/w cefepime  C/w vancomycin, can d/c if MRSA swab negative  F/u pending BCx  F/u sputum cx  F/u urinary PNA Ag (strep pneumo/legionella)  Add-on full RVP (ordered)  IVFs, anti-pyretics, supportive care  Tele monitoring, appreciate cardio recs  Further recs to follow pending above    D/w PA  Infectious Diseases will follow. Please call with any questions.  Aiyana Call M.D.  [facility] Division of Infectious Diseases [phone]  For after 5 P.M. and weekends, please call [phone]        Infectious Diseases will follow. Please call with any questions.  Aiyana Call M.D.  [facility] Division of Infectious Diseases [phone]  For after 5 P.M. and weekends, please call [phone]   Pt is a 49M w/ PMHx of fever, cough x2-3 wks. s/p azithromycin x7 day course, w/o improvement now on steroid course w/o sx improvement.  eporting possible enterovirus/rhinovirus as a component of illness that was dx as an outpatient. Wife and both daughters also sick    Sepsis 2/2 PNA  Afib 2/2 acute infection  Now found to be in new-onset rapid afib; currently ranging from 120s-140s. Also reporting Febrile at home  Leukocytosis to 25  CT chest w/ LLL PNA  S/p vancomycin, cefepime, fluids    Recommendations:   C/w cefepime  C/w vancomycin, can d/c if MRSA swab negative  F/u pending BCx  F/u sputum cx  F/u urinary PNA Ag (strep pneumo/legionella)  Add-on full RVP (ordered)  IVFs, anti-pyretics, supportive care  Tele monitoring, appreciate cardio recs  Further recs to follow pending above    D/w PA  Infectious Diseases will follow. Please call with any questions.  Aiyana Call M.D.  [facility] Division of Infectious Diseases [phone]  For after 5 P.M. and weekends, please call [phone]

## 2024-10-09 NOTE — ED ADULT NURSE REASSESSMENT NOTE - NS ED NURSE REASSESS COMMENT FT1
Report given to CDU Roberta Lewis, Pt transported over safety. Pt not in any distress. Pt safety maintained.
pt A&Ox4 c/o not feeling well and chills. medicated as per orders. remains on continuous monitor, Afib noted to 130s. MD López aware. as per MD, to come to bedside to assess. respirations even and unlabored. VS as noted. denies chest pain, SOB, n/v/d, HA. no acute distress noted. awaiting bed assignment. safety maintained side rails up
Pt resting in stretcher A&Ox4, ambulatory to the bathroom with steady gait, states to "feel much better." Noted to be less diaphoretic then initial assessment. Patient RR equal and unlabored on room air, Sinus tachycardia on cardiac monitor. IV intact. Care plan continued. Comfort measures provided. Safety maintained.

## 2024-10-09 NOTE — H&P ADULT - NSHPPHYSICALEXAM_GEN_ALL_CORE
PHYSICAL EXAM:      Constitutional: NAD, well-groomed, well-developed  HEENT:  EOMI, Normal Hearing  Neck: No LAD, No JVD  Back: Normal spine flexure, No CVA tenderness  Respiratory: + expiratory wheezing  Cardiovascular: S1 and S2, irreg irreg  Gastrointestinal: BS+, soft, NT/ND  Extremities: No peripheral edema  Vascular: 2+ peripheral pulses  Neurological: A/O x 3, no focal deficits  Psychiatric: Normal mood, normal affect  Musculoskeletal: 5/5 strength b/l upper and lower extremities  Skin: No rashes

## 2024-10-09 NOTE — H&P ADULT - PROBLEM SELECTOR PLAN 2
-2/2 pna  -c/w vanc, cefepime, fluid resuscitation  -follow cultures  -will dose 40 IV solumedrol x3, c/w prednisone 40mg daily tomorrow

## 2024-10-09 NOTE — ED CDU PROVIDER DISPOSITION NOTE - CLINICAL COURSE
· Details: 41-year-old male with no stated past medical history presenting to the ER with 3 weeks of fever, chills, body aches and cough.  Patient states that [facility] and was prescribed cough syrup, he returned 1 week later and was prescribed a Z-Harish and an albuterol inhaler.  He states he did not start the Z-harish instead saw his pulmonologist and was prescribed Zithromax for 7 days as well as a prednisone taper that he is currently on day 9.    	In main ED patient arrives tachycardic with heart rate 130, febrile temp 103 orally, normotensive, O2 sat 96% on room air.  CBC with WBC count 21.87, CMP on actionable, initial lactate 4.6 which down trended to 2.1, CT chest showing left lower lobe pneumonia, also found to have incidental left thyroid nodule.  Patient sent to CDU for continued IV antibiotics, IV fluids, antipyretics as needed  Overnight in CDU pt developed rapid afib, given additional IV fluids, broadened abx to include vancomycin, given antipyretics. Admitted for further treat of sepsis, pneumonia, new onset afib 41-year-old male with no stated past medical history presenting to the ER with 3 weeks of fever, chills, body aches and cough.  Patient states that he [facility] and was prescribed cough syrup, he returned 1 week later and was prescribed a Z-Harish and an albuterol inhaler.  He states he did not start the Z-harish instead saw his pulmonologist and was prescribed Zithromax for 7 days as well as a prednisone taper that he is currently on day 9.    	In main ED patient arrives tachycardic with heart rate 130, febrile temp 103 orally, normotensive, O2 sat 96% on room air.  CBC with WBC count 21.87, CMP on actionable, initial lactate 4.6 which down trended to 2.1, CT chest showing left lower lobe pneumonia, also found to have incidental left thyroid nodule.  Patient sent to CDU for continued IV antibiotics, IV fluids, antipyretics as needed  Overnight in CDU pt developed rapid afib, given additional IV fluids, broadened abx to include vancomycin, given antipyretics. Admitted for further treat of sepsis, pneumonia, new onset afib

## 2024-10-09 NOTE — ED CDU PROVIDER SUBSEQUENT DAY NOTE - ATTENDING APP SHARED VISIT CONTRIBUTION OF CARE
I have personally performed a face to face medical and diagnostic evaluation of the patient. I have discussed with and reviewed the ACP's note and agree with the History, ROS, Physical Exam and MDM unless otherwise indicated. A brief summary of my personal evaluation and impression can be found below.     41-year-old male, initially placed in CDU after being diagnosed with sepsis from pneumonia now with persistently elevated heart rate to 130s.  EKG shows new onset A-fib however patient rectally febrile.  Will start on cooling blanket given patient has already received Toradol and Tylenol.  Will obtain repeat labs, will include cardiac markers and thyroid panel to assess for additional contributors to new heart rate.  Anticipate admission. Lori Campos, ED Attending

## 2024-10-09 NOTE — ED CDU PROVIDER SUBSEQUENT DAY NOTE - PROGRESS NOTE DETAILS
Pt tachycardia with HR increase to 150s, EKG afib with RVR, pt ordered for 1L bolus, toradol, rpt BP normotensive. Discussed with ED attending . Plan for admission Pt tachycardia with HR increase to 150s, EKG afib with RVR, pt ordered for 1L bolus, toradol, rpt BP normotensive. Discussed with ED attending. Plan for admission Pt evaluated at bedside by ed attending, agree with current plan, will broaden abx coverage. Hospitalist recommending admission to , left message awaiting callback Pt evaluated at bedside by ed attending , agree with current plan, will broaden abx coverage. Hospitalist recommending admission to , left message awaiting callback Given persistent tachycardia pt to be brought to main ED Spoke with  accepts pt on telemetry, pt aware of admission

## 2024-10-09 NOTE — ED CDU PROVIDER DISPOSITION NOTE - WET READ LAUNCH FT
Called Pt, changed pharmacy and informed him to stop all NSAID'S while taking medrol dose pack, thanks There are no Wet Read(s) to document.

## 2024-10-09 NOTE — ED CDU PROVIDER DISPOSITION NOTE - ATTENDING APP SHARED VISIT CONTRIBUTION OF CARE
I have personally performed a face to face medical and diagnostic evaluation of the patient. I have discussed with and reviewed the ACP's note and agree with the History, ROS, Physical Exam and MDM unless otherwise indicated. A brief summary of my personal evaluation and impression can be found below.     41-year-old male with no past medical history presenting for evaluation for sepsis secondary to pneumonia.  Patient still tachycardic to the 120s however repeat lab work nonactionable.  Hemodynamically stable.  Fevers trending down.  Admitted to medicine on telemetry for further workup. Lori Campos, ED Attending

## 2024-10-09 NOTE — CONSULT NOTE ADULT - ASSESSMENT
48 yo m with sepsis 2/2 pna      #  LLL pneumonia.   # sepsis  # afib    -c/w vanc, cefepime, fluid resuscitation  -follow cultures  -will dose 40 IV solumedrol x3, c/w prednisone 40mg daily tomorrow.  overall etiology may include elevated BMI with current sepsis precipitating rapid afib. would obtain tte to r/o SHD.   -given thyroid nodule seen on CT, will ordered thyroid sono; TFTs wnl  -c/w fluid resuscitation; if HR still elevated, would trial 10 IV Cardizem if BP stable  -chadsvasc 0 unless subsequent vitals/labwork/tte demonstrate otherwise  -low modified wells for PE, but will order duplex given new onset afib.  f/u EP

## 2024-10-09 NOTE — H&P ADULT - HISTORY OF PRESENT ILLNESS
[address]  [address] [facility] [address]  Phone: [phone]  Fax: [phone]    NEVA Norman CNP        January 10, 2022     Patient: Milton Fuller   YOB: 2014   Date of Visit: 1/10/2022       To Whom it May Concern:    Nidia Lucior was seen in my clinic on 1/10/2022. He may return to school on 1/12/22. Furthermore he is in quarantine therefore his parent will need to stay home to care for him. If you have any questions or concerns, please don't hesitate to call.     Sincerely,         NEVA Norman CNP 48 yo m with no reported PMH p/w fever, cough for last 2-3 weeks. Completed 7 day course azithromycin, and currently on steroid course (day 9/14) with no symptomatic improvement. Found to be in new-onset rapid afib; currently ranging from 120s-140s. Has received 4.5 L IVF, vanc/cefepime, tylenol, Toradol Pt also ebdorses some anxiety.  Home tmax 103. wbc 25 k. CTA chest negative for PE, + for LLL pna, 1.7cm left thyroid nodule. TSH, T3, FT4 wnl   Pt with no cardiac history no h/o htn, stroke or dm.  48 yo m with no reported PMH p/w fever, cough for last 2-3 weeks. Completed 7 day course azithromycin, and currently on steroid course (day 9/14) with no symptomatic improvement. Found to be in new-onset rapid afib; currently ranging from 120s-140s. Has received 4.5 L IVF, vanc/cefepime, tylenol, Toradol Pt also ebdorses some anxiety.  Home tmax 103. wbc 25 k. CTA chest negative for PE, + for LLL pna, 1.7cm left thyroid nodule. TSH, T3, FT4 wnl   Pt with no cardiac history no h/o htn, stroke or dm.   Notes mild covid 2 months ago  Mini rvp negative 50 yo m with no reported PMH p/w fever, cough for last 2-3 weeks. Completed 7 day course azithromycin, and currently on steroid course (day 9/14) with no symptomatic improvement. Found to be in new-onset rapid afib; currently ranging from 120s-140s. Has received 4.5 L IVF, vanc/cefepime, tylenol, Toradol Pt also ebdorses some anxiety.  Home tmax 103. wbc 25 k. CT chest IV  + for LLL pna, 1.7cm left thyroid nodule. TSH, T3, FT4 wnl   Pt with no cardiac history no h/o htn, stroke or dm.   Notes mild covid 2 months ago  Mini rvp negative

## 2024-10-09 NOTE — PHARMACOTHERAPY INTERVENTION NOTE - COMMENTS
Medication history is complete. Medication list updated in Outpatient Medication Record ([facility]). Please call spectra [phone] if you have any questions.

## 2024-10-09 NOTE — H&P ADULT - PROBLEM SELECTOR PLAN 3
-overall etiology may include elevated BMI with current sepsis precipitating rapid afib. would obtain tte to r/o SHD. CTA  negative for PE  -given thyroid nodule seen on CT, will ordered thyroid sono; TFTs wnl  -c/w fluid resuscitation; if HR still elevated, would trial 10 IV Cardizem if BP stable -overall etiology may include elevated BMI with current sepsis precipitating rapid afib. would obtain tte to r/o SHD. CTA  negative for PE  -given thyroid nodule seen on CT, will ordered thyroid sono; TFTs wnl  -c/w fluid resuscitation; if HR still elevated, would trial 10 IV Cardizem if BP stable  -chadsvasc 0 unless subsequent vitals/labwork/tte demonstrate otherwise -overall etiology may include elevated BMI with current sepsis precipitating rapid afib. would obtain tte to r/o SHD.   -given thyroid nodule seen on CT, will ordered thyroid sono; TFTs wnl  -c/w fluid resuscitation; if HR still elevated, would trial 10 IV Cardizem if BP stable  -chadsvasc 0 unless subsequent vitals/labwork/tte demonstrate otherwise  -low modified wells for PE, but will order duplex given new onset afib

## 2024-10-10 LAB
ANION GAP SERPL CALC-SCNC: 12 MMOL/L — SIGNIFICANT CHANGE UP (ref 7–14)
BASOPHILS # BLD AUTO: 0.03 K/UL — SIGNIFICANT CHANGE UP (ref 0–0.2)
BASOPHILS NFR BLD AUTO: 0.2 % — SIGNIFICANT CHANGE UP (ref 0–2)
BUN SERPL-MCNC: 6 MG/DL — LOW (ref 7–23)
CALCIUM SERPL-MCNC: 8.7 MG/DL — SIGNIFICANT CHANGE UP (ref 8.4–10.5)
CHLORIDE SERPL-SCNC: 105 MMOL/L — SIGNIFICANT CHANGE UP (ref 98–107)
CO2 SERPL-SCNC: 19 MMOL/L — LOW (ref 22–31)
CREAT SERPL-MCNC: 0.58 MG/DL — SIGNIFICANT CHANGE UP (ref 0.5–1.3)
EGFR: 120 ML/MIN/1.73M2 — SIGNIFICANT CHANGE UP
EOSINOPHIL # BLD AUTO: 0 K/UL — SIGNIFICANT CHANGE UP (ref 0–0.5)
EOSINOPHIL NFR BLD AUTO: 0 % — SIGNIFICANT CHANGE UP (ref 0–6)
GLUCOSE SERPL-MCNC: 184 MG/DL — HIGH (ref 70–99)
GRAM STN FLD: ABNORMAL
HCT VFR BLD CALC: 36.1 % — LOW (ref 39–50)
HGB BLD-MCNC: 12.1 G/DL — LOW (ref 13–17)
IANC: 16.08 K/UL — HIGH (ref 1.8–7.4)
IMM GRANULOCYTES NFR BLD AUTO: 1.1 % — HIGH (ref 0–0.9)
LYMPHOCYTES # BLD AUTO: 1.43 K/UL — SIGNIFICANT CHANGE UP (ref 1–3.3)
LYMPHOCYTES # BLD AUTO: 7.7 % — LOW (ref 13–44)
MAGNESIUM SERPL-MCNC: 2.3 MG/DL — SIGNIFICANT CHANGE UP (ref 1.6–2.6)
MCHC RBC-ENTMCNC: 28.7 PG — SIGNIFICANT CHANGE UP (ref 27–34)
MCHC RBC-ENTMCNC: 33.5 GM/DL — SIGNIFICANT CHANGE UP (ref 32–36)
MCV RBC AUTO: 85.5 FL — SIGNIFICANT CHANGE UP (ref 80–100)
MONOCYTES # BLD AUTO: 0.9 K/UL — SIGNIFICANT CHANGE UP (ref 0–0.9)
MONOCYTES NFR BLD AUTO: 4.8 % — SIGNIFICANT CHANGE UP (ref 2–14)
MRSA PCR RESULT.: SIGNIFICANT CHANGE UP
NEUTROPHILS # BLD AUTO: 16.08 K/UL — HIGH (ref 1.8–7.4)
NEUTROPHILS NFR BLD AUTO: 86.2 % — HIGH (ref 43–77)
NRBC # BLD: 0 /100 WBCS — SIGNIFICANT CHANGE UP (ref 0–0)
NRBC # FLD: 0 K/UL — SIGNIFICANT CHANGE UP (ref 0–0)
PHOSPHATE SERPL-MCNC: 2.5 MG/DL — SIGNIFICANT CHANGE UP (ref 2.5–4.5)
PLATELET # BLD AUTO: 374 K/UL — SIGNIFICANT CHANGE UP (ref 150–400)
POTASSIUM SERPL-MCNC: 4.1 MMOL/L — SIGNIFICANT CHANGE UP (ref 3.5–5.3)
POTASSIUM SERPL-SCNC: 4.1 MMOL/L — SIGNIFICANT CHANGE UP (ref 3.5–5.3)
RBC # BLD: 4.22 M/UL — SIGNIFICANT CHANGE UP (ref 4.2–5.8)
RBC # FLD: 13.2 % — SIGNIFICANT CHANGE UP (ref 10.3–14.5)
S AUREUS DNA NOSE QL NAA+PROBE: SIGNIFICANT CHANGE UP
S PNEUM AG UR QL: NEGATIVE — SIGNIFICANT CHANGE UP
SODIUM SERPL-SCNC: 136 MMOL/L — SIGNIFICANT CHANGE UP (ref 135–145)
SPECIMEN SOURCE: SIGNIFICANT CHANGE UP
VANCOMYCIN TROUGH SERPL-MCNC: <4 UG/ML — LOW (ref 10–20)
WBC # BLD: 18.65 K/UL — HIGH (ref 3.8–10.5)
WBC # FLD AUTO: 18.65 K/UL — HIGH (ref 3.8–10.5)

## 2024-10-10 PROCEDURE — 99232 SBSQ HOSP IP/OBS MODERATE 35: CPT

## 2024-10-10 RX ORDER — VANCOMYCIN HCL-SODIUM CHLORIDE IV SOLN 1.5 GM/250ML-0.9% 1.5-0.9/25 GM/ML-%
1500 SOLUTION INTRAVENOUS EVERY 12 HOURS
Refills: 0 | Status: DISCONTINUED | OUTPATIENT
Start: 2024-10-10 | End: 2024-10-10

## 2024-10-10 RX ORDER — VANCOMYCIN HCL-SODIUM CHLORIDE IV SOLN 1.5 GM/250ML-0.9% 1.5-0.9/25 GM/ML-%
1500 SOLUTION INTRAVENOUS EVERY 8 HOURS
Refills: 0 | Status: DISCONTINUED | OUTPATIENT
Start: 2024-10-10 | End: 2024-10-10

## 2024-10-10 RX ORDER — METOPROLOL TARTRATE 50 MG
25 TABLET ORAL EVERY 8 HOURS
Refills: 0 | Status: DISCONTINUED | OUTPATIENT
Start: 2024-10-10 | End: 2024-10-10

## 2024-10-10 RX ORDER — PANTOPRAZOLE SODIUM 40 MG/1
40 TABLET, DELAYED RELEASE ORAL
Refills: 0 | Status: DISCONTINUED | OUTPATIENT
Start: 2024-10-10 | End: 2024-10-14

## 2024-10-10 RX ORDER — DILTIAZEM HCL 300 MG
5 CAPSULE, EXTENDED RELEASE 24HR ORAL ONCE
Refills: 0 | Status: COMPLETED | OUTPATIENT
Start: 2024-10-10 | End: 2024-10-10

## 2024-10-10 RX ORDER — CHLORHEXIDINE GLUCONATE ORAL RINSE 1.2 MG/ML
1 SOLUTION DENTAL DAILY
Refills: 0 | Status: DISCONTINUED | OUTPATIENT
Start: 2024-10-10 | End: 2024-10-14

## 2024-10-10 RX ORDER — METOPROLOL TARTRATE 50 MG
25 TABLET ORAL EVERY 6 HOURS
Refills: 0 | Status: DISCONTINUED | OUTPATIENT
Start: 2024-10-10 | End: 2024-10-11

## 2024-10-10 RX ADMIN — CEFEPIME 100 MILLIGRAM(S): 2 INJECTION, POWDER, FOR SOLUTION INTRAVENOUS at 05:59

## 2024-10-10 RX ADMIN — Medication 25 MILLIGRAM(S): at 18:23

## 2024-10-10 RX ADMIN — VANCOMYCIN HCL-SODIUM CHLORIDE IV SOLN 1.5 GM/250ML-0.9% 300 MILLIGRAM(S): 1.5-0.9/25 SOLUTION at 07:00

## 2024-10-10 RX ADMIN — CEFEPIME 100 MILLIGRAM(S): 2 INJECTION, POWDER, FOR SOLUTION INTRAVENOUS at 21:39

## 2024-10-10 RX ADMIN — Medication 25 MILLIGRAM(S): at 11:48

## 2024-10-10 RX ADMIN — Medication 25 MILLIGRAM(S): at 05:56

## 2024-10-10 RX ADMIN — CHLORHEXIDINE GLUCONATE ORAL RINSE 1 APPLICATION(S): 1.2 SOLUTION DENTAL at 11:43

## 2024-10-10 RX ADMIN — PANTOPRAZOLE SODIUM 40 MILLIGRAM(S): 40 TABLET, DELAYED RELEASE ORAL at 09:54

## 2024-10-10 RX ADMIN — Medication 5 MILLIGRAM(S): at 00:40

## 2024-10-10 RX ADMIN — METHYLPREDNISOLONE ACETATE 40 MILLIGRAM(S): 80 INJECTION, SUSPENSION INTRA-ARTICULAR; INTRALESIONAL; INTRAMUSCULAR; SOFT TISSUE at 05:56

## 2024-10-10 RX ADMIN — APIXABAN 5 MILLIGRAM(S): 5 TABLET, FILM COATED ORAL at 17:34

## 2024-10-10 RX ADMIN — APIXABAN 5 MILLIGRAM(S): 5 TABLET, FILM COATED ORAL at 05:56

## 2024-10-10 RX ADMIN — PREDNISONE 40 MILLIGRAM(S): 5 TABLET ORAL at 05:56

## 2024-10-10 RX ADMIN — CEFEPIME 100 MILLIGRAM(S): 2 INJECTION, POWDER, FOR SOLUTION INTRAVENOUS at 11:48

## 2024-10-10 NOTE — PROGRESS NOTE ADULT - SUBJECTIVE AND OBJECTIVE BOX
PATIENT SEEN AND EXAMINED BY ALMAS HASSAN M.D. ON :- 10/10/24  DATE OF SERVICE:     10/10/24        Interim events noted,Labs ,Radiological studies and Cardiology tests reviewed .    Patient is a 49y old  Male who presents with a chief complaint of sepsis 2/2 pna (10 Oct 2024 12:40)      HPI:  50 yo m with no reported PMH p/w fever, cough for last 2-3 weeks. Completed 7 day course azithromycin, and currently on steroid course (day 9/14) with no symptomatic improvement. Found to be in new-onset rapid afib; currently ranging from 120s-140s. Has received 4.5 L IVF, vanc/cefepime, tylenol, Toradol Pt also ebdorses some anxiety.  Home tmax 103. wbc 25 k. CT chest IV  + for LLL pna, 1.7cm left thyroid nodule. TSH, T3, FT4 wnl   Pt with no cardiac history no h/o htn, stroke or dm.   Notes mild covid 2 months ago  Mini rvp negative (09 Oct 2024 06:17)      PAST MEDICAL & SURGICAL HISTORY:  No pertinent past medical history      No significant past surgical history          PREVIOUS DIAGNOSTIC TESTING:      ECHO  FINDINGS:    STRESS  FINDINGS:    CATHETERIZATION  FINDINGS:    MEDICATIONS  (STANDING):  apixaban 5 milliGRAM(s) Oral every 12 hours  cefepime   IVPB 2000 milliGRAM(s) IV Intermittent every 8 hours  chlorhexidine 2% Cloths 1 Application(s) Topical daily  metoprolol tartrate 25 milliGRAM(s) Oral every 6 hours  pantoprazole    Tablet 40 milliGRAM(s) Oral before breakfast  predniSONE   Tablet 40 milliGRAM(s) Oral daily  sodium chloride 0.9%. 1000 milliLiter(s) (80 mL/Hr) IV Continuous <Continuous>    MEDICATIONS  (PRN):  acetaminophen     Tablet .. 650 milliGRAM(s) Oral every 6 hours PRN Temp greater or equal to 38C (100.4F), Mild Pain (1 - 3)  ketorolac   Injectable 15 milliGRAM(s) IV Push every 6 hours PRN Mild Pain (1 - 3)      FAMILY HISTORY:      SOCIAL HISTORY:    CIGARETTES:    ALCOHOL:    REVIEW OF SYSTEMS:  CONSTITUTIONAL: No fever, weight loss, or fatigue  EYES: No eye pain, visual disturbances, or discharge  ENMT:  No difficulty hearing, tinnitus, vertigo; No sinus or throat pain  NECK: No pain or stiffness  RESPIRATORY: No cough, wheezing, chills or hemoptysis; No shortness of breath  CARDIOVASCULAR: No chest pain, palpitations, dizziness, or leg swelling  GASTROINTESTINAL: No abdominal or epigastric pain. No nausea, vomiting, or hematemesis; No diarrhea or constipation. No melena or hematochezia.  GENITOURINARY: No dysuria, frequency, hematuria, or incontinence  NEUROLOGICAL: No headaches, memory loss, loss of strength, numbness, or tremors  SKIN: No itching, burning, rashes, or lesions   LYMPH NODES: No enlarged glands  ENDOCRINE: No heat or cold intolerance; No hair loss  MUSCULOSKELETAL: No joint pain or swelling; No muscle, back, or extremity pain  PSYCHIATRIC: No depression, anxiety, mood swings, or difficulty sleeping  HEME/LYMPH: No easy bruising, or bleeding gums  ALLERY AND IMMUNOLOGIC: No hives or eczema    Vital Signs Last 24 Hrs  T(C): 36.9 (10 Oct 2024 17:58), Max: 37.6 (10 Oct 2024 00:46)  T(F): 98.4 (10 Oct 2024 17:58), Max: 99.6 (10 Oct 2024 00:46)  HR: 98 (10 Oct 2024 17:58) (96 - 114)  BP: 129/89 (10 Oct 2024 17:58) (119/75 - 136/86)  BP(mean): --  RR: 18 (10 Oct 2024 17:58) (18 - 18)  SpO2: 98% (10 Oct 2024 17:58) (98% - 99%)    Parameters below as of 10 Oct 2024 17:58  Patient On (Oxygen Delivery Method): room air          PHYSICAL EXAM:  GENERAL: NAD, well-groomed, well-developed  HEAD:  Atraumatic, Normocephalic  EYES: EOMI, PERRLA, conjunctiva and sclera clear  ENMT: No tonsillar erythema, exudates, or enlargement; Moist mucous membranes, Good dentition, No lesions  NECK: Supple, No JVD, Normal thyroid  NERVOUS SYSTEM:  Alert & Oriented X3, Good concentration; Motor Strength 5/5 B/L upper and lower extremities; DTRs 2+ intact and symmetric  CHEST/LUNG: Clear to percussion bilaterally; No rales, rhonchi, wheezing, or rubs  HEART: Regular rate and rhythm; No murmurs, rubs, or gallops  ABDOMEN: Soft, Nontender, Nondistended; Bowel sounds present  EXTREMITIES:  2+ Peripheral Pulses, No clubbing, cyanosis, or edema  LYMPH: No lymphadenopathy noted  SKIN: No rashes or lesions      INTERPRETATION OF TELEMETRY:    [facility]:    [facility]:     LABS:                        12.1   18.65 )-----------( 374      ( 10 Oct 2024 05:01 )             36.1     10-10    136  |  105  |  6[L]  ----------------------------<  184[H]  4.1   |  19[L]  |  0.58    Ca    8.7      10 Oct 2024 05:01  Phos  2.5     10-10  Mg     2.30     10-10    TPro  6.8  /  Alb  3.2[L]  /  TBili  0.8  /  DBili  x   /  AST  17  /  ALT  51[H]  /  AlkPhos  105  10-09        PT/INR - ( 09 Oct 2024 08:43 )   PT: 12.3 sec;   INR: 1.03 ratio         PTT - ( 09 Oct 2024 08:43 )  PTT:37.4 sec  Urinalysis Basic - ( 10 Oct 2024 05:01 )    Color: x / Appearance: x / SG: x / pH: x  Gluc: 184 mg/dL / Ketone: x  / Bili: x / Urobili: x   Blood: x / Protein: x / Nitrite: x   Leuk Esterase: x / RBC: x / WBC x   Sq Epi: x / Non Sq Epi: x / Bacteria: x      Lipid Panel:   I&O's Summary      RADIOLOGY & ADDITIONAL STUDIES:    CONCLUSIONS:      1. Left ventricular cavity is normal in size. Left ventricular wall thickness is normal. Left ventricular systolic function is normal with an ejection fraction of 61 % by Jaquez's method of disks. There are no regional wall motion abnormalities seen.   2. Normal right ventricular cavity size and normal right ventricular systolic function. Tricuspid annular plane systolic excursion (TAPSE) is 2.2 cm (normal >=1.7 cm).   3. Structurally normal mitral valve with normal leaflet excursion. There is trace mitral regurgitation.

## 2024-10-10 NOTE — PROGRESS NOTE ADULT - ASSESSMENT
49 year old male with no pertinent PMH presented to the ED with fever, chills, body aches and cough. He is being treated for LL lobe PNA. Patient was also found to be in newly diagnosed atrial fibrillation with VR 120s-140s. Review of telemetry show sinus rhythm/sinus tachycardia on admission and patient converted to atrial fibrillation with RVR. Telemetry overnight showed  atrial fibrillation with VR as high as 180s in the setting of PNA, fever. Telemetry now shows atrial fibrillation with VR 110s-130s and as high as 160s overnight. On IV antibiotics for PNA. Echocardiogram shows normal LV function  - Continue to monitor on telemetry  - Maintain K>4 and Mg>2  - Increase metoprolol to 25 mg Q6H  - Continue Eliquis 5 mg BID  - Will plan for HÉCTOR/DCCV when medically stable  - Continue management as per primary team

## 2024-10-10 NOTE — PROGRESS NOTE ADULT - ASSESSMENT
Pt is a 49M w/ PMHx of fever, cough x2-3 wks. s/p azithromycin x7 day course, w/o improvement now on steroid course w/o sx improvement.  eporting possible enterovirus/rhinovirus as a component of illness that was dx as an outpatient. Wife and both daughters also sick    Sepsis 2/2 PNA  Afib 2/2 acute infection  Now found to be in new-onset rapid afib; currently ranging from 120s-140s. Also reporting Febrile at home  Leukocytosis to 25  CT chest w/ LLL PNA  BCx NGTD   Sputum cx pending    Recommendations:   C/w cefepime  C/w vancomycin, can d/c if MRSA swab negative  F/u sputum cx  F/u urinary PNA Ag (strep pneumo/legionella)  Add-on full RVP (ordered)  IVFs, anti-pyretics, supportive care  Tele monitoring, appreciate cardio recs  Further recs to follow pending above    Infectious Diseases will follow. Please call with any questions.  Aiyana Call M.D.  [facility] Division of Infectious Diseases [phone]  For after 5 P.M. and weekends, please call [phone]

## 2024-10-10 NOTE — PROGRESS NOTE ADULT - SUBJECTIVE AND OBJECTIVE BOX
Patient is seen and examined. He feels better. Denies any chest pain, SOB, palpitations or dizziness  Telemetry shows atrial fibrillation with VR 110s-130s, and as high as 160s overnight    PAST MEDICAL & SURGICAL HISTORY:  No pertinent past medical history    No significant past surgical history        MEDICATIONS  (STANDING):  apixaban 5 milliGRAM(s) Oral every 12 hours  cefepime   IVPB 2000 milliGRAM(s) IV Intermittent every 8 hours  metoprolol tartrate 25 milliGRAM(s) Oral every 8 hours  pantoprazole    Tablet 40 milliGRAM(s) Oral before breakfast  predniSONE   Tablet 40 milliGRAM(s) Oral daily  sodium chloride 0.9%. 1000 milliLiter(s) (80 mL/Hr) IV Continuous <Continuous>  vancomycin  IVPB 1500 milliGRAM(s) IV Intermittent every 12 hours    MEDICATIONS  (PRN):  acetaminophen     Tablet .. 650 milliGRAM(s) Oral every 6 hours PRN Temp greater or equal to 38C (100.4F), Mild Pain (1 - 3)  ketorolac   Injectable 15 milliGRAM(s) IV Push every 6 hours PRN Mild Pain (1 - 3)      Vital Signs Last 24 Hrs  T(C): 36.7 (10 Oct 2024 05:58), Max: 38.1 (09 Oct 2024 14:45)  T(F): 98.1 (10 Oct 2024 05:58), Max: 100.5 (09 Oct 2024 14:45)  HR: 114 (10 Oct 2024 05:58) (83 - 141)  BP: 119/75 (10 Oct 2024 05:58) (106/66 - 130/82)  BP(mean): --  RR: 18 (10 Oct 2024 05:58) (17 - 18)  SpO2: 99% (10 Oct 2024 05:58) (98% - 100%)    Parameters below as of 10 Oct 2024 05:58  Patient On (Oxygen Delivery Method): room air    LABS:                        12.1   18.65 )-----------( 374      ( 10 Oct 2024 05:01 )             36.1     10-10    136  |  105  |  6[L]  ----------------------------<  184[H]  4.1   |  19[L]  |  0.58    Ca    8.7      10 Oct 2024 05:01  Phos  2.5     10-10  Mg     2.30     10-10    TPro  6.8  /  Alb  3.2[L]  /  TBili  0.8  /  DBili  x   /  AST  17  /  ALT  51[H]  /  AlkPhos  105  10-09        PT/INR - ( 09 Oct 2024 08:43 )   PT: 12.3 sec;   INR: 1.03 ratio         PTT - ( 09 Oct 2024 08:43 )  PTT:37.4 sec  Urinalysis Basic - ( 10 Oct 2024 05:01 )    Color: x / Appearance: x / SG: x / pH: x  Gluc: 184 mg/dL / Ketone: x  / Bili: x / Urobili: x   Blood: x / Protein: x / Nitrite: x   Leuk Esterase: x / RBC: x / WBC x   Sq Epi: x / Non Sq Epi: x / Bacteria: x    PHYSICAL EXAM:    GENERAL: In no apparent distress, well nourished, and hydrated.  HEART: Irregular rate and rhythm; No murmurs, rubs, or gallops.  PULMONARY: Scattered rhonchi RLL and fine crackles @ bases   ABDOMEN: Soft, Nontender, Nondistended; Bowel sounds present  EXTREMITIES:  2+ Peripheral Pulses, No clubbing, cyanosis, or edema

## 2024-10-10 NOTE — PROGRESS NOTE ADULT - SUBJECTIVE AND OBJECTIVE BOX
[facility], Division of Infectious Diseases  BAYRON Mullins Y. Patel, S. Shah, [facility]  [phone]    Name: AMADOU TAMEZ  Age: 49y  Gender: Male  MRN: [id number]    Interval History:  Patient seen and examined at bedside this morning  No acute overnight events. Afebrile  Fevers, chills resolved and pt feeling significantly better  Notes reviewed    Antibiotics:  cefepime   IVPB 2000 milliGRAM(s) IV Intermittent every 8 hours  vancomycin  IVPB 1500 milliGRAM(s) IV Intermittent every 12 hours      Medications:  acetaminophen     Tablet .. 650 milliGRAM(s) Oral every 6 hours PRN  apixaban 5 milliGRAM(s) Oral every 12 hours  cefepime   IVPB 2000 milliGRAM(s) IV Intermittent every 8 hours  chlorhexidine 2% Cloths 1 Application(s) Topical daily  ketorolac   Injectable 15 milliGRAM(s) IV Push every 6 hours PRN  metoprolol tartrate 25 milliGRAM(s) Oral every 8 hours  pantoprazole    Tablet 40 milliGRAM(s) Oral before breakfast  predniSONE   Tablet 40 milliGRAM(s) Oral daily  sodium chloride 0.9%. 1000 milliLiter(s) IV Continuous <Continuous>  vancomycin  IVPB 1500 milliGRAM(s) IV Intermittent every 12 hours      Review of Systems:  A 10-point review of systems was obtained.   Review of systems otherwise negative except as previously noted.    Allergies: No Known Allergies    For details regarding the patient's past medical history, social history, family history, and other miscellaneous elements, please refer the initial infectious diseases consultation and/or the admitting history and physical examination for this admission.    Objective:  Vitals:   T(C): 36.9 (10-10-24 @ 09:58), Max: 38.1 (10-09-24 @ 14:45)  HR: 100 (10-10-24 @ 09:58) (83 - 126)  BP: 131/86 (10-10-24 @ 09:58) (119/75 - 131/86)  RR: 18 (10-10-24 @ 09:58) (17 - 18)  SpO2: 98% (10-10-24 @ 09:58) (98% - 100%)    Physical Examination:  General: no acute distress  HEENT: NC/AT, EOMI,  Cardio: S1, S2 heard, RRR, no murmurs  Resp: CTA  Abd: soft, NT, ND  Ext: no edema or cyanosis  Skin: warm, dry, no visible rash      Laboratory Studies:  CBC:                       12.1   18.65 )-----------( 374      ( 10 Oct 2024 05:01 )             36.1     CMP: 10-10    136  |  105  |  6[L]  ----------------------------<  184[H]  4.1   |  19[L]  |  0.58    Ca    8.7      10 Oct 2024 05:01  Phos  2.5     10-10  Mg     2.30     10-10    TPro  6.8  /  Alb  3.2[L]  /  TBili  0.8  /  DBili  x   /  AST  17  /  ALT  51[H]  /  AlkPhos  105  10-09    LIVER FUNCTIONS - ( 09 Oct 2024 08:43 )  Alb: 3.2 g/dL / Pro: 6.8 g/dL / ALK PHOS: 105 U/L / ALT: 51 U/L / AST: 17 U/L / GGT: x           Urinalysis Basic - ( 10 Oct 2024 05:01 )    Color: x / Appearance: x / SG: x / pH: x  Gluc: 184 mg/dL / Ketone: x  / Bili: x / Urobili: x   Blood: x / Protein: x / Nitrite: x   Leuk Esterase: x / RBC: x / WBC x   Sq Epi: x / Non Sq Epi: x / Bacteria: x        Microbiology: reviewed    Culture - Sputum (collected 10-09-24 @ 20:00)  Source: .Sputum Sputum  Gram Stain (10-10-24 @ 07:47):    Few polymorphonuclear leukocytes per low power field    Few Squamous epithelial cells per low power field    Few Gram Negative Rods seen per oil power field    Rare Gram Positive Rods seen per oil power field    Few Gram Positive Cocci in Clusters seen peroil power field    Rare Gram Positive Cocci in Pairs and Chains seen per oil power field    Urinalysis with Rflx Culture (collected 10-08-24 @ 16:33)    Culture - Blood (collected 10-08-24 @ 13:23)  Source: .Blood BLOOD  Preliminary Report (10-09-24 @ 19:01):    No growth at 24 hours    Culture - Blood (collected 10-08-24 @ 13:05)  Source: .Blood BLOOD  Preliminary Report (10-09-24 @ 19:01):    No growth at 24 hours          Radiology: reviewed

## 2024-10-10 NOTE — PROGRESS NOTE ADULT - ASSESSMENT
50 yo m with sepsis 2/2 pna      #  LLL pneumonia.   # sepsis  # afib    -c/w vanc, cefepime, fluid resuscitation  -follow cultures  -will dose 40 IV solumedrol x3, c/w prednisone 40mg daily tomorrow.  overall etiology may include elevated BMI with current sepsis precipitating rapid afib. would obtain tte to r/o SHD.   -given thyroid nodule seen on CT, will ordered thyroid sono; TFTs wnl  -c/w fluid resuscitation; if HR still elevated, would trial 10 IV Cardizem if BP stable  -chadsvasc 0 unless subsequent vitals/labwork/tte demonstrate otherwise  -low modified wells for PE, but will order duplex given new onset afib.  f/u EP

## 2024-10-10 NOTE — PROGRESS NOTE ADULT - SUBJECTIVE AND OBJECTIVE BOX
SUBJECTIVE / OVERNIGHT EVENTS:pt seen and examined    MEDICATIONS  (STANDING):  apixaban 5 milliGRAM(s) Oral every 12 hours  cefepime   IVPB 2000 milliGRAM(s) IV Intermittent every 8 hours  chlorhexidine 2% Cloths 1 Application(s) Topical daily  metoprolol tartrate 25 milliGRAM(s) Oral every 6 hours  pantoprazole    Tablet 40 milliGRAM(s) Oral before breakfast  predniSONE   Tablet 40 milliGRAM(s) Oral daily  sodium chloride 0.9%. 1000 milliLiter(s) (80 mL/Hr) IV Continuous <Continuous>    MEDICATIONS  (PRN):  acetaminophen     Tablet .. 650 milliGRAM(s) Oral every 6 hours PRN Temp greater or equal to 38C (100.4F), Mild Pain (1 - 3)  ketorolac   Injectable 15 milliGRAM(s) IV Push every 6 hours PRN Mild Pain (1 - 3)  I&O's Summary    Vital Signs Last 24 Hrs  T(C): 36.9 (10-10-24 @ 17:58), Max: 37.6 (10-10-24 @ 00:46)  T(F): 98.4 (10-10-24 @ 17:58), Max: 99.6 (10-10-24 @ 00:46)  HR: 98 (10-10-24 @ 17:58) (96 - 114)  BP: 129/89 (10-10-24 @ 17:58) (119/75 - 136/86)  BP(mean): --  RR: 18 (10-10-24 @ 17:58) (18 - 18)  SpO2: 98% (10-10-24 @ 17:58) (98% - 99%)      Constitutional: No fever, fatigue  Skin: No rash.  Eyes: No recent vision problems or eye pain.  ENT: No congestion, ear pain, or sore throat.  Cardiovascular: No chest pain or palpation.  Respiratory: No cough, shortness of breath, congestion, or wheezing.  Gastrointestinal: No abdominal pain, nausea, vomiting, or diarrhea.  Genitourinary: No dysuria.  Musculoskeletal: No joint swelling.  Neurologic: No headache.    PHYSICAL EXAM:  GENERAL: NAD  EYES: EOMI, PERRLA  NECK: Supple, No JVD  CHEST/LUNG: dec breath sounds at bases  HEART:  S1 , S2 +  ABDOMEN: soft , bs+  EXTREMITIES:  edama  NEUROLOGY:alert awake      LABS:  10-10    136  |  105  |  6[L]  ----------------------------<  184[H]  4.1   |  19[L]  |  0.58    Ca    8.7      10 Oct 2024 05:01  Phos  2.5     10-10  Mg     2.30     10-10    TPro  6.8  /  Alb  3.2[L]  /  TBili  0.8  /  DBili      /  AST  17  /  ALT  51[H]  /  AlkPhos  105  10-09    Creatinine Trend: 0.58 <--, 0.61 <--, 0.60 <--, 0.78 <--                        12.1   18.65 )-----------( 374      ( 10 Oct 2024 05:01 )             36.1     Urine Studies:  Urinalysis Basic - ( 10 Oct 2024 05:01 )    Color:  / Appearance:  / SG:  / pH:   Gluc: 184 mg/dL / Ketone:   / Bili:  / Urobili:    Blood:  / Protein:  / Nitrite:    Leuk Esterase:  / RBC:  / WBC    Sq Epi:  / Non Sq Epi:  / Bacteria:               LIVER FUNCTIONS - ( 09 Oct 2024 08:43 )  Alb: 3.2 g/dL / Pro: 6.8 g/dL / ALK PHOS: 105 U/L / ALT: 51 U/L / AST: 17 U/L / GGT: x           PT/INR - ( 09 Oct 2024 08:43 )   PT: 12.3 sec;   INR: 1.03 ratio         PTT - ( 09 Oct 2024 08:43 )  PTT:37.4 sec  RADIOLOGY & ADDITIONAL TESTS:    Imaging Personally Reviewed:    Consultant(s) Notes Reviewed:      Care Discussed with Consultants/Other Providers:

## 2024-10-11 LAB
ADD ON TEST-SPECIMEN IN LAB: SIGNIFICANT CHANGE UP
ANION GAP SERPL CALC-SCNC: 9 MMOL/L — SIGNIFICANT CHANGE UP (ref 7–14)
B PERT DNA SPEC QL NAA+PROBE: SIGNIFICANT CHANGE UP
B PERT+PARAPERT DNA PNL SPEC NAA+PROBE: SIGNIFICANT CHANGE UP
BASOPHILS # BLD AUTO: 0.06 K/UL — SIGNIFICANT CHANGE UP (ref 0–0.2)
BASOPHILS NFR BLD AUTO: 0.2 % — SIGNIFICANT CHANGE UP (ref 0–2)
BUN SERPL-MCNC: 14 MG/DL — SIGNIFICANT CHANGE UP (ref 7–23)
C PNEUM DNA SPEC QL NAA+PROBE: SIGNIFICANT CHANGE UP
CALCIUM SERPL-MCNC: 9.1 MG/DL — SIGNIFICANT CHANGE UP (ref 8.4–10.5)
CHLORIDE SERPL-SCNC: 104 MMOL/L — SIGNIFICANT CHANGE UP (ref 98–107)
CO2 SERPL-SCNC: 27 MMOL/L — SIGNIFICANT CHANGE UP (ref 22–31)
CREAT SERPL-MCNC: 0.8 MG/DL — SIGNIFICANT CHANGE UP (ref 0.5–1.3)
CULTURE RESULTS: ABNORMAL
EGFR: 108 ML/MIN/1.73M2 — SIGNIFICANT CHANGE UP
EOSINOPHIL # BLD AUTO: 0.02 K/UL — SIGNIFICANT CHANGE UP (ref 0–0.5)
EOSINOPHIL NFR BLD AUTO: 0.1 % — SIGNIFICANT CHANGE UP (ref 0–6)
FLUAV AG NPH QL: SIGNIFICANT CHANGE UP
FLUAV SUBTYP SPEC NAA+PROBE: SIGNIFICANT CHANGE UP
FLUBV AG NPH QL: SIGNIFICANT CHANGE UP
FLUBV RNA SPEC QL NAA+PROBE: SIGNIFICANT CHANGE UP
GLUCOSE BLDC GLUCOMTR-MCNC: 138 MG/DL — HIGH (ref 70–99)
GLUCOSE SERPL-MCNC: 105 MG/DL — HIGH (ref 70–99)
HADV DNA SPEC QL NAA+PROBE: SIGNIFICANT CHANGE UP
HCOV 229E RNA SPEC QL NAA+PROBE: SIGNIFICANT CHANGE UP
HCOV HKU1 RNA SPEC QL NAA+PROBE: SIGNIFICANT CHANGE UP
HCOV NL63 RNA SPEC QL NAA+PROBE: SIGNIFICANT CHANGE UP
HCOV OC43 RNA SPEC QL NAA+PROBE: SIGNIFICANT CHANGE UP
HCT VFR BLD CALC: 41.8 % — SIGNIFICANT CHANGE UP (ref 39–50)
HGB BLD-MCNC: 13.8 G/DL — SIGNIFICANT CHANGE UP (ref 13–17)
HMPV RNA SPEC QL NAA+PROBE: SIGNIFICANT CHANGE UP
HPIV1 RNA SPEC QL NAA+PROBE: SIGNIFICANT CHANGE UP
HPIV2 RNA SPEC QL NAA+PROBE: SIGNIFICANT CHANGE UP
HPIV3 RNA SPEC QL NAA+PROBE: SIGNIFICANT CHANGE UP
HPIV4 RNA SPEC QL NAA+PROBE: SIGNIFICANT CHANGE UP
IANC: 20.47 K/UL — HIGH (ref 1.8–7.4)
IMM GRANULOCYTES NFR BLD AUTO: 1.7 % — HIGH (ref 0–0.9)
LACTATE SERPL-SCNC: 2.7 MMOL/L — HIGH (ref 0.5–2)
LYMPHOCYTES # BLD AUTO: 11.6 % — LOW (ref 13–44)
LYMPHOCYTES # BLD AUTO: 3 K/UL — SIGNIFICANT CHANGE UP (ref 1–3.3)
M PNEUMO DNA SPEC QL NAA+PROBE: SIGNIFICANT CHANGE UP
MAGNESIUM SERPL-MCNC: 2.4 MG/DL — SIGNIFICANT CHANGE UP (ref 1.6–2.6)
MCHC RBC-ENTMCNC: 28.5 PG — SIGNIFICANT CHANGE UP (ref 27–34)
MCHC RBC-ENTMCNC: 33 GM/DL — SIGNIFICANT CHANGE UP (ref 32–36)
MCV RBC AUTO: 86.4 FL — SIGNIFICANT CHANGE UP (ref 80–100)
MONOCYTES # BLD AUTO: 1.79 K/UL — HIGH (ref 0–0.9)
MONOCYTES NFR BLD AUTO: 6.9 % — SIGNIFICANT CHANGE UP (ref 2–14)
NEUTROPHILS # BLD AUTO: 20.47 K/UL — HIGH (ref 1.8–7.4)
NEUTROPHILS NFR BLD AUTO: 79.5 % — HIGH (ref 43–77)
NRBC # BLD: 0 /100 WBCS — SIGNIFICANT CHANGE UP (ref 0–0)
NRBC # FLD: 0 K/UL — SIGNIFICANT CHANGE UP (ref 0–0)
PHOSPHATE SERPL-MCNC: 3.7 MG/DL — SIGNIFICANT CHANGE UP (ref 2.5–4.5)
PLATELET # BLD AUTO: 475 K/UL — HIGH (ref 150–400)
POTASSIUM SERPL-MCNC: 4.1 MMOL/L — SIGNIFICANT CHANGE UP (ref 3.5–5.3)
POTASSIUM SERPL-SCNC: 4.1 MMOL/L — SIGNIFICANT CHANGE UP (ref 3.5–5.3)
PROCALCITONIN SERPL-MCNC: 0.07 NG/ML — SIGNIFICANT CHANGE UP (ref 0.02–0.1)
RAPID RVP RESULT: DETECTED
RBC # BLD: 4.84 M/UL — SIGNIFICANT CHANGE UP (ref 4.2–5.8)
RBC # FLD: 13.4 % — SIGNIFICANT CHANGE UP (ref 10.3–14.5)
RSV RNA NPH QL NAA+NON-PROBE: SIGNIFICANT CHANGE UP
RSV RNA SPEC QL NAA+PROBE: SIGNIFICANT CHANGE UP
RV+EV RNA SPEC QL NAA+PROBE: DETECTED
SARS-COV-2 RNA SPEC QL NAA+PROBE: DETECTED
SARS-COV-2 RNA SPEC QL NAA+PROBE: SIGNIFICANT CHANGE UP
SODIUM SERPL-SCNC: 140 MMOL/L — SIGNIFICANT CHANGE UP (ref 135–145)
SPECIMEN SOURCE: SIGNIFICANT CHANGE UP
WBC # BLD: 25.77 K/UL — HIGH (ref 3.8–10.5)
WBC # FLD AUTO: 25.77 K/UL — HIGH (ref 3.8–10.5)

## 2024-10-11 PROCEDURE — 71045 X-RAY EXAM CHEST 1 VIEW: CPT | Mod: 26

## 2024-10-11 PROCEDURE — 99232 SBSQ HOSP IP/OBS MODERATE 35: CPT

## 2024-10-11 RX ORDER — METOPROLOL TARTRATE 50 MG
5 TABLET ORAL ONCE
Refills: 0 | Status: COMPLETED | OUTPATIENT
Start: 2024-10-11 | End: 2024-10-11

## 2024-10-11 RX ORDER — METOPROLOL TARTRATE 50 MG
50 TABLET ORAL EVERY 8 HOURS
Refills: 0 | Status: DISCONTINUED | OUTPATIENT
Start: 2024-10-11 | End: 2024-10-13

## 2024-10-11 RX ORDER — SODIUM CHLORIDE 0.9 % (FLUSH) 0.9 %
1000 SYRINGE (ML) INJECTION ONCE
Refills: 0 | Status: COMPLETED | OUTPATIENT
Start: 2024-10-11 | End: 2024-10-11

## 2024-10-11 RX ORDER — ACETAMINOPHEN 325 MG
650 TABLET ORAL ONCE
Refills: 0 | Status: COMPLETED | OUTPATIENT
Start: 2024-10-11 | End: 2024-10-11

## 2024-10-11 RX ORDER — GUAIFENESIN 100 MG/5ML
600 SOLUTION ORAL EVERY 12 HOURS
Refills: 0 | Status: DISCONTINUED | OUTPATIENT
Start: 2024-10-11 | End: 2024-10-14

## 2024-10-11 RX ADMIN — Medication 650 MILLIGRAM(S): at 19:59

## 2024-10-11 RX ADMIN — CEFEPIME 100 MILLIGRAM(S): 2 INJECTION, POWDER, FOR SOLUTION INTRAVENOUS at 05:50

## 2024-10-11 RX ADMIN — Medication 1000 MILLILITER(S): at 12:37

## 2024-10-11 RX ADMIN — PREDNISONE 40 MILLIGRAM(S): 5 TABLET ORAL at 05:46

## 2024-10-11 RX ADMIN — Medication 25 MILLIGRAM(S): at 12:39

## 2024-10-11 RX ADMIN — APIXABAN 5 MILLIGRAM(S): 5 TABLET, FILM COATED ORAL at 05:47

## 2024-10-11 RX ADMIN — APIXABAN 5 MILLIGRAM(S): 5 TABLET, FILM COATED ORAL at 18:33

## 2024-10-11 RX ADMIN — Medication 1000 MILLIGRAM(S): at 22:40

## 2024-10-11 RX ADMIN — Medication 650 MILLIGRAM(S): at 12:34

## 2024-10-11 RX ADMIN — Medication 25 MILLIGRAM(S): at 00:56

## 2024-10-11 RX ADMIN — Medication 50 MILLIGRAM(S): at 15:47

## 2024-10-11 RX ADMIN — CEFEPIME 100 MILLIGRAM(S): 2 INJECTION, POWDER, FOR SOLUTION INTRAVENOUS at 15:44

## 2024-10-11 RX ADMIN — CEFEPIME 100 MILLIGRAM(S): 2 INJECTION, POWDER, FOR SOLUTION INTRAVENOUS at 21:57

## 2024-10-11 RX ADMIN — Medication 50 MILLIGRAM(S): at 21:57

## 2024-10-11 RX ADMIN — Medication 25 MILLIGRAM(S): at 05:47

## 2024-10-11 RX ADMIN — Medication 5 MILLIGRAM(S): at 13:24

## 2024-10-11 RX ADMIN — PANTOPRAZOLE SODIUM 40 MILLIGRAM(S): 40 TABLET, DELAYED RELEASE ORAL at 05:50

## 2024-10-11 RX ADMIN — CHLORHEXIDINE GLUCONATE ORAL RINSE 1 APPLICATION(S): 1.2 SOLUTION DENTAL at 12:35

## 2024-10-11 RX ADMIN — Medication 650 MILLIGRAM(S): at 18:33

## 2024-10-11 NOTE — PROGRESS NOTE ADULT - ASSESSMENT
49 year old male with no pertinent PMH presented to the ED with fever, chills, body aches and cough. He is being treated for LL lobe PNA. Patient was also found to be in newly diagnosed atrial fibrillation with RVR. Telemetry shows atrial fibrillation with VR 110s-120s overnight and currently as high as 180s @ times in the setting of fever, chills and elevated WBC- trending up. On IV antibiotics for PNA. Echocardiogram shows normal LV function  - Continue to monitor on telemetry  - Maintain K>4 and Mg>2  - Increase metoprolol to 50 mg Q8H  - Continue Eliquis 5 mg BID  - Will plan for HÉCTOR/DCCV when medically stable  - Treat the underlying infection  - Continue management as per primary team

## 2024-10-11 NOTE — DIETITIAN INITIAL EVALUATION ADULT - ORAL INTAKE PTA/DIET HISTORY
Pt with good po intakes prior to admission, lives with his Family, meals are a combination of take out + home cooked. Pt reports possible ~5 lbs weight loss within the past 3 weeks 2/2 feeling unwell. Pt was being treated with steroids prior to admission. Pt also endorses going to the gym in an effort to lose weight.  Pt with good po intakes prior to admission, lives with his Family, meals are a combination of take out + home cooked. Pt reports possible ~5 lbs weight loss within the past 3 weeks 2/2 feeling unwell. Pt was being treated with steroids prior to admission. Pt also endorses going to the gym prior to getting sick.

## 2024-10-11 NOTE — PROGRESS NOTE ADULT - ASSESSMENT
Pt is a 49M w/ PMHx of fever, cough x2-3 wks. s/p azithromycin x7 day course, w/o improvement now on steroid course w/o sx improvement.  reporting possible enterovirus/rhinovirus as a component of illness that was dx as an outpatient. Wife and both daughters also sick    Sepsis 2/2 PNA  Afib 2/2 acute infection  CT chest w/ LLL PNA  BCx NGTD   Sputum cx Normal héctor  urine legionella negative  urine strep pneumo Ag negative  Repeat RVP sent + enterovirus/rhinovirus and COVID    Recommendations:   Unclear if COVID is old viral shedding as he recently had the virus back in August.  At this stage since overall onset of sx was 3 wks ago, pt is not currently a candidate for the remdesivir  C/w cefepime for superimposed bacterial PNA  D/c vancomycin, MRSA swab negative  C/w steroids, nebs, O2 as needed  Trend temps/WBC  --leukocytosis can also be increasing in setting of steroids use, but will monitor  IVFs, anti-pyretics, supportive care  Tele monitoring, appreciate cardio recs    Infectious Diseases will follow. Please call with any questions.  Aiyana Call M.D.  [facility] Division of Infectious Diseases [phone]  For after 5 P.M. and weekends, please call [phone] Pt is a 49M w/ PMHx of fever, cough x2-3 wks. s/p azithromycin x7 day course, w/o improvement now on steroid course w/o sx improvement.  reporting possible enterovirus/rhinovirus as a component of illness that was dx as an outpatient. Wife and both daughters also sick    Sepsis 2/2 PNA  Afib 2/2 acute infection  CT chest w/ LLL PNA  BCx NGTD   Sputum cx Normal héctor  urine legionella negative  urine strep pneumo Ag negative  Repeat RVP sent + enterovirus/rhinovirus and COVID    Recommendations:   Unclear if COVID is old viral shedding as he recently had the virus back in August.  At this stage since overall onset of sx was 3 wks ago, pt is not currently a candidate for the remdesivir  C/w cefepime for superimposed bacterial PNA  D/c vancomycin, MRSA swab negative  C/w steroids, nebs  Trend temps/WBC  --leukocytosis can also be increasing in setting of steroids use as pt received methylpred on admission and is currently on prednison, but will monitor  IVFs, anti-pyretics, supportive care  Tele monitoring, appreciate cardio recs    If patient clinically improves and BCx NGTD x24 hrs, can d/c on ceftin 500mg BID to complete x7 day course until 10/16/24    Dr. Mobley covering weekend service  I will resume care 10/14  Infectious Diseases will follow. Please call with any questions.  Aiyana Call M.D.  [facility] Division of Infectious Diseases [phone]  For after 5 P.M. and weekends, please call [phone]

## 2024-10-11 NOTE — DIETITIAN INITIAL EVALUATION ADULT - PERTINENT LABORATORY DATA
10-11    140  |  104  |  14  ----------------------------<  105[H]  4.1   |  27  |  0.80    Ca    9.1      11 Oct 2024 05:44  Phos  3.7     10-11  Mg     2.40     10-11    A1C with Estimated Average Glucose Result: 5.7 % (10-09-24 @ 08:43)  A1C with Estimated Average Glucose Result: 5.8 % (10-08-24 @ 13:23)

## 2024-10-11 NOTE — PROGRESS NOTE ADULT - SUBJECTIVE AND OBJECTIVE BOX
SUBJECTIVE / OVERNIGHT EVENTS:pt seen and examined      Constitutional: No fever, fatigue  Skin: No rash.  Eyes: No recent vision problems or eye pain.  ENT: No congestion, ear pain, or sore throat.  Cardiovascular: No chest pain or palpation.  Respiratory: No cough, shortness of breath, congestion, or wheezing.  Gastrointestinal: No abdominal pain, nausea, vomiting, or diarrhea.  Genitourinary: No dysuria.  Musculoskeletal: No joint swelling.  Neurologic: No headache.    PHYSICAL EXAM:  GENERAL: NAD  EYES: EOMI, PERRLA  NECK: Supple, No JVD  CHEST/LUNG: dec breath sounds at bases  HEART:  S1 , S2 +  ABDOMEN: soft , bs+  EXTREMITIES:  edama  NEUROLOGY:alert awake      LABS:  10-10    136  |  105  |  6[L]  ----------------------------<  184[H]  4.1   |  19[L]  |  0.58    Ca    8.7      10 Oct 2024 05:01  Phos  2.5     10-10  Mg     2.30     10-10    TPro  6.8  /  Alb  3.2[L]  /  TBili  0.8  /  DBili      /  AST  17  /  ALT  51[H]  /  AlkPhos  105  10-09    Creatinine Trend: 0.58 <--, 0.61 <--, 0.60 <--, 0.78 <--                        12.1   18.65 )-----------( 374      ( 10 Oct 2024 05:01 )             36.1     Urine Studies:  Urinalysis Basic - ( 10 Oct 2024 05:01 )    Color:  / Appearance:  / SG:  / pH:   Gluc: 184 mg/dL / Ketone:   / Bili:  / Urobili:    Blood:  / Protein:  / Nitrite:    Leuk Esterase:  / RBC:  / WBC    Sq Epi:  / Non Sq Epi:  / Bacteria:               LIVER FUNCTIONS - ( 09 Oct 2024 08:43 )  Alb: 3.2 g/dL / Pro: 6.8 g/dL / ALK PHOS: 105 U/L / ALT: 51 U/L / AST: 17 U/L / GGT: x           PT/INR - ( 09 Oct 2024 08:43 )   PT: 12.3 sec;   INR: 1.03 ratio         PTT - ( 09 Oct 2024 08:43 )  PTT:37.4 sec  RADIOLOGY & ADDITIONAL TESTS:    Imaging Personally Reviewed:    Consultant(s) Notes Reviewed:      Care Discussed with Consultants/Other Providers:

## 2024-10-11 NOTE — PROGRESS NOTE ADULT - SUBJECTIVE AND OBJECTIVE BOX
[facility], Division of Infectious Diseases  BAYRON Mullins Y. Patel, S. Shah, [facility]  [phone]    Name: AMADOU TAMEZ  Age: 49y  Gender: Male  MRN: [id number]    Interval History:  Patient seen and examined at bedside  Febrile again w/ chills overnight  Notes reviewed    Antibiotics:  cefepime   IVPB 2000 milliGRAM(s) IV Intermittent every 8 hours      Medications:  acetaminophen     Tablet .. 650 milliGRAM(s) Oral every 6 hours PRN  apixaban 5 milliGRAM(s) Oral every 12 hours  cefepime   IVPB 2000 milliGRAM(s) IV Intermittent every 8 hours  chlorhexidine 2% Cloths 1 Application(s) Topical daily  ketorolac   Injectable 15 milliGRAM(s) IV Push every 6 hours PRN  metoprolol tartrate 50 milliGRAM(s) Oral every 8 hours  pantoprazole    Tablet 40 milliGRAM(s) Oral before breakfast  predniSONE   Tablet 40 milliGRAM(s) Oral daily  sodium chloride 0.9%. 1000 milliLiter(s) IV Continuous <Continuous>      Review of Systems:  A 10-point review of systems was obtained.   Review of systems otherwise negative except as previously noted.    Allergies: No Known Allergies    For details regarding the patient's past medical history, social history, family history, and other miscellaneous elements, please refer the initial infectious diseases consultation and/or the admitting history and physical examination for this admission.    Objective:  Vitals:   T(C): 39.2 (10-11-24 @ 07:59), Max: 39.2 (10-11-24 @ 07:59)  HR: 88 (10-11-24 @ 07:35) (88 - 105)  BP: 149/80 (10-11-24 @ 07:35) (116/81 - 149/80)  RR: 18 (10-11-24 @ 07:35) (18 - 18)  SpO2: 98% (10-11-24 @ 07:35) (97% - 100%)    Physical Examination:  General: no acute distress  HEENT: NC/AT,  Cardio: RRR  Resp: decreased breath sounds   Abd: soft, NT, ND,  Ext: no edema or cyanosis  Skin: warm, dry, no visible rash      Laboratory Studies:  CBC:                       13.8   25.77 )-----------( 475      ( 11 Oct 2024 05:44 )             41.8     CMP: 10-11    140  |  104  |  14  ----------------------------<  105[H]  4.1   |  27  |  0.80    Ca    9.1      11 Oct 2024 05:44  Phos  3.7     10-11  Mg     2.40     10-11        Urinalysis Basic - ( 11 Oct 2024 05:44 )    Color: x / Appearance: x / SG: x / pH: x  Gluc: 105 mg/dL / Ketone: x  / Bili: x / Urobili: x   Blood: x / Protein: x / Nitrite: x   Leuk Esterase: x / RBC: x / WBC x   Sq Epi: x / Non Sq Epi: x / Bacteria: x        Microbiology: reviewed    Culture - Sputum (collected 10-09-24 @ 20:00)  Source: .Sputum Sputum  Gram Stain (10-10-24 @ 07:47):    Few polymorphonuclear leukocytes per low power field    Few Squamous epithelial cells per low power field    Few Gram Negative Rods seen per oil power field    Rare Gram Positive Rods seen per oil power field    Few Gram Positive Cocci in Clusters seen peroil power field    Rare Gram Positive Cocci in Pairs and Chains seen per oil power field  Final Report (10-11-24 @ 09:02):    Commensal héctor consistent with body site    Urinalysis with Rflx Culture (collected 10-08-24 @ 16:33)    Culture - Blood (collected 10-08-24 @ 13:23)  Source: .Blood BLOOD  Preliminary Report (10-10-24 @ 19:01):    No growth at 48 Hours    Culture - Blood (collected 10-08-24 @ 13:05)  Source: .Blood BLOOD  Preliminary Report (10-10-24 @ 19:01):    No growth at 48 Hours          Radiology: reviewed

## 2024-10-11 NOTE — DIETITIAN INITIAL EVALUATION ADULT - PERTINENT MEDS FT
MEDICATIONS  (STANDING):  apixaban 5 milliGRAM(s) Oral every 12 hours  cefepime   IVPB 2000 milliGRAM(s) IV Intermittent every 8 hours  chlorhexidine 2% Cloths 1 Application(s) Topical daily  metoprolol tartrate 25 milliGRAM(s) Oral every 6 hours  pantoprazole    Tablet 40 milliGRAM(s) Oral before breakfast  predniSONE   Tablet 40 milliGRAM(s) Oral daily  sodium chloride 0.9%. 1000 milliLiter(s) (80 mL/Hr) IV Continuous <Continuous>    MEDICATIONS  (PRN):  acetaminophen     Tablet .. 650 milliGRAM(s) Oral every 6 hours PRN Temp greater or equal to 38C (100.4F), Mild Pain (1 - 3)  ketorolac   Injectable 15 milliGRAM(s) IV Push every 6 hours PRN Mild Pain (1 - 3)

## 2024-10-11 NOTE — DIETITIAN INITIAL EVALUATION ADULT - OTHER INFO
Reports good appetite and po intake and denies nausea/vomiting/diarrhea/constipation or any issues with chewing/swallowing. Usual reported weight ~240 lbs.     Received nutrition consult for education, Pt with new Afib, elevated BMI. HgA1C 5.7, Pt is on steroids. Informed of result. Pt was counselled on Heart Healthy Nutrition Therapy. Encouraged limiting salty, processed, concentrated sweets. Provided with written information for future reference.

## 2024-10-11 NOTE — PROGRESS NOTE ADULT - SUBJECTIVE AND OBJECTIVE BOX
Patient is seen and examined. Patient denies any chest pain, SOB, or dizziness. He c/o not feeling well, + cough, palpitations, fever and chills, Fever 102.6 F. WBC trending up   Telemetry shows atrial fibrillation with VR 110s-120s overnight and 140s-160s now and as high as 180s    PAST MEDICAL & SURGICAL HISTORY:  No pertinent past medical history    No significant past surgical history        MEDICATIONS  (STANDING):  apixaban 5 milliGRAM(s) Oral every 12 hours  cefepime   IVPB 2000 milliGRAM(s) IV Intermittent every 8 hours  chlorhexidine 2% Cloths 1 Application(s) Topical daily  metoprolol tartrate 25 milliGRAM(s) Oral every 6 hours  pantoprazole    Tablet 40 milliGRAM(s) Oral before breakfast  predniSONE   Tablet 40 milliGRAM(s) Oral daily  sodium chloride 0.9%. 1000 milliLiter(s) (80 mL/Hr) IV Continuous <Continuous>    MEDICATIONS  (PRN):  acetaminophen     Tablet .. 650 milliGRAM(s) Oral every 6 hours PRN Temp greater or equal to 38C (100.4F), Mild Pain (1 - 3)  ketorolac   Injectable 15 milliGRAM(s) IV Push every 6 hours PRN Mild Pain (1 - 3)      Vital Signs Last 24 Hrs  T(C): 39.2 (11 Oct 2024 07:59), Max: 39.2 (11 Oct 2024 07:59)  T(F): 102.6 (11 Oct 2024 07:59), Max: 102.6 (11 Oct 2024 07:59)  HR: 88 (11 Oct 2024 07:35) (88 - 105)  BP: 149/80 (11 Oct 2024 07:35) (116/81 - 149/80)  BP(mean): --  RR: 18 (11 Oct 2024 07:35) (18 - 18)  SpO2: 98% (11 Oct 2024 07:35) (97% - 100%)    Parameters below as of 11 Oct 2024 07:35  Patient On (Oxygen Delivery Method): room air    LABS:                        13.8   25.77 )-----------( 475      ( 11 Oct 2024 05:44 )             41.8     10-11    140  |  104  |  14  ----------------------------<  105[H]  4.1   |  27  |  0.80    Ca    9.1      11 Oct 2024 05:44  Phos  3.7     10-11  Mg     2.40     10-11      Urinalysis Basic - ( 11 Oct 2024 05:44 )    Color: x / Appearance: x / SG: x / pH: x  Gluc: 105 mg/dL / Ketone: x  / Bili: x / Urobili: x   Blood: x / Protein: x / Nitrite: x   Leuk Esterase: x / RBC: x / WBC x   Sq Epi: x / Non Sq Epi: x / Bacteria: x    PHYSICAL EXAM:    GENERAL: + fever and + chills, ill appearing  HEART: Irregular rate and rhythm;   PULMONARY: Clear to auscultation and percussion.  No rales, wheezing, or rhonchi bilaterally.  ABDOMEN: Soft, Nontender, Nondistended; Bowel sounds present  EXTREMITIES:  2+ Peripheral Pulses, No clubbing, cyanosis, or edema

## 2024-10-11 NOTE — PROGRESS NOTE ADULT - SUBJECTIVE AND OBJECTIVE BOX
PATIENT SEEN AND EXAMINED BY ALMAS HASSAN M.D. ON :- 10/11/24  DATE OF SERVICE:        10/11/24     Interim events noted,Labs ,Radiological studies and Cardiology tests reviewed .    Patient is a 49y old  Male who presents with a chief complaint of sepsis 2/2 pna (11 Oct 2024 13:31)      HPI:  48 yo m with no reported PMH p/w fever, cough for last 2-3 weeks. Completed 7 day course azithromycin, and currently on steroid course (day 9/14) with no symptomatic improvement. Found to be in new-onset rapid afib; currently ranging from 120s-140s. Has received 4.5 L IVF, vanc/cefepime, tylenol, Toradol Pt also ebdorses some anxiety.  Home tmax 103. wbc 25 k. CT chest IV  + for LLL pna, 1.7cm left thyroid nodule. TSH, T3, FT4 wnl   Pt with no cardiac history no h/o htn, stroke or dm.   Notes mild covid 2 months ago  Mini rvp negative (09 Oct 2024 06:17)      PAST MEDICAL & SURGICAL HISTORY:  No pertinent past medical history      No significant past surgical history          PREVIOUS DIAGNOSTIC TESTING:      ECHO  FINDINGS:    STRESS  FINDINGS:    CATHETERIZATION  FINDINGS:    MEDICATIONS  (STANDING):  apixaban 5 milliGRAM(s) Oral every 12 hours  cefepime   IVPB 2000 milliGRAM(s) IV Intermittent every 8 hours  chlorhexidine 2% Cloths 1 Application(s) Topical daily  metoprolol tartrate 50 milliGRAM(s) Oral every 8 hours  pantoprazole    Tablet 40 milliGRAM(s) Oral before breakfast  predniSONE   Tablet 40 milliGRAM(s) Oral daily  sodium chloride 0.9%. 1000 milliLiter(s) (80 mL/Hr) IV Continuous <Continuous>    MEDICATIONS  (PRN):  acetaminophen     Tablet .. 650 milliGRAM(s) Oral every 6 hours PRN Temp greater or equal to 38C (100.4F), Mild Pain (1 - 3)  guaiFENesin  milliGRAM(s) Oral every 12 hours PRN Cough  ketorolac   Injectable 15 milliGRAM(s) IV Push every 6 hours PRN Mild Pain (1 - 3)      FAMILY HISTORY:      SOCIAL HISTORY:    CIGARETTES:    ALCOHOL:    REVIEW OF SYSTEMS:  CONSTITUTIONAL: No fever, weight loss, or fatigue  EYES: No eye pain, visual disturbances, or discharge  ENMT:  No difficulty hearing, tinnitus, vertigo; No sinus or throat pain  NECK: No pain or stiffness  RESPIRATORY: No cough, wheezing, chills or hemoptysis; No shortness of breath  CARDIOVASCULAR: No chest pain, palpitations, dizziness, or leg swelling  GASTROINTESTINAL: No abdominal or epigastric pain. No nausea, vomiting, or hematemesis; No diarrhea or constipation. No melena or hematochezia.  GENITOURINARY: No dysuria, frequency, hematuria, or incontinence  NEUROLOGICAL: No headaches, memory loss, loss of strength, numbness, or tremors  SKIN: No itching, burning, rashes, or lesions   LYMPH NODES: No enlarged glands  ENDOCRINE: No heat or cold intolerance; No hair loss  MUSCULOSKELETAL: No joint pain or swelling; No muscle, back, or extremity pain  PSYCHIATRIC: No depression, anxiety, mood swings, or difficulty sleeping  HEME/LYMPH: No easy bruising, or bleeding gums  ALLERY AND IMMUNOLOGIC: No hives or eczema    Vital Signs Last 24 Hrs  T(C): 36.6 (11 Oct 2024 21:13), Max: 39.2 (11 Oct 2024 07:59)  T(F): 97.9 (11 Oct 2024 21:13), Max: 102.6 (11 Oct 2024 07:59)  HR: 79 (11 Oct 2024 21:13) (75 - 105)  BP: 113/74 (11 Oct 2024 21:13) (113/67 - 149/80)  BP(mean): --  RR: 18 (11 Oct 2024 21:13) (18 - 18)  SpO2: 99% (11 Oct 2024 21:13) (97% - 100%)    Parameters below as of 11 Oct 2024 21:13  Patient On (Oxygen Delivery Method): room air          PHYSICAL EXAM:  GENERAL: NAD, well-groomed, well-developed  HEAD:  Atraumatic, Normocephalic  EYES: EOMI, PERRLA, conjunctiva and sclera clear  ENMT: No tonsillar erythema, exudates, or enlargement; Moist mucous membranes, Good dentition, No lesions  NECK: Supple, No JVD, Normal thyroid  NERVOUS SYSTEM:  Alert & Oriented X3, Good concentration; Motor Strength 5/5 B/L upper and lower extremities; DTRs 2+ intact and symmetric  CHEST/LUNG: Clear to percussion bilaterally; No rales, rhonchi, wheezing, or rubs  HEART: Regular rate and rhythm; No murmurs, rubs, or gallops  ABDOMEN: Soft, Nontender, Nondistended; Bowel sounds present  EXTREMITIES:  2+ Peripheral Pulses, No clubbing, cyanosis, or edema  LYMPH: No lymphadenopathy noted  SKIN: No rashes or lesions      INTERPRETATION OF TELEMETRY:    [facility]    [facility]     LABS:                        13.8   25.77 )-----------( 475      ( 11 Oct 2024 05:44 )             41.8     10-11    140  |  104  |  14  ----------------------------<  105[H]  4.1   |  27  |  0.80    Ca    9.1      11 Oct 2024 05:44  Phos  3.7     10-11  Mg     2.40     10-11            Urinalysis Basic - ( 11 Oct 2024 05:44 )    Color: x / Appearance: x / SG: x / pH: x  Gluc: 105 mg/dL / Ketone: x  / Bili: x / Urobili: x   Blood: x / Protein: x / Nitrite: x   Leuk Esterase: x / RBC: x / WBC x   Sq Epi: x / Non Sq Epi: x / Bacteria: x      Lipid Panel:   I&O's Summary    11 Oct 2024 07:01  -  11 Oct 2024 23:19  --------------------------------------------------------  IN: 80 mL / OUT: 0 mL / NET: 80 mL        RADIOLOGY & ADDITIONAL STUDIES:  CONCLUSIONS:      1. Left ventricular cavity is normal in size. Left ventricular wall thickness is normal. Left ventricular systolic function is normal with an ejection fraction of 61 % by Jaquez's method of disks. There are no regional wall motion abnormalities seen.   2. Normal right ventricular cavity size and normal right ventricular systolic function. Tricuspid annular plane systolic excursion (TAPSE) is 2.2 cm (normal >=1.7 cm).   3. Structurally normal mitral valve with normal leaflet excursion. There is trace mitral regurgitation.

## 2024-10-11 NOTE — DIETITIAN INITIAL EVALUATION ADULT - PROBLEM SELECTOR PLAN 3
-overall etiology may include elevated BMI with current sepsis precipitating rapid afib. would obtain tte to r/o SHD.   -given thyroid nodule seen on CT, will ordered thyroid sono; TFTs wnl  -c/w fluid resuscitation; if HR still elevated, would trial 10 IV Cardizem if BP stable  -chadsvasc 0 unless subsequent vitals/labwork/tte demonstrate otherwise  -low modified wells for PE, but will order duplex given new onset afib

## 2024-10-12 LAB
ANION GAP SERPL CALC-SCNC: 13 MMOL/L — SIGNIFICANT CHANGE UP (ref 7–14)
BASOPHILS # BLD AUTO: 0.07 K/UL — SIGNIFICANT CHANGE UP (ref 0–0.2)
BASOPHILS NFR BLD AUTO: 0.3 % — SIGNIFICANT CHANGE UP (ref 0–2)
BUN SERPL-MCNC: 13 MG/DL — SIGNIFICANT CHANGE UP (ref 7–23)
CALCIUM SERPL-MCNC: 8.5 MG/DL — SIGNIFICANT CHANGE UP (ref 8.4–10.5)
CHLORIDE SERPL-SCNC: 103 MMOL/L — SIGNIFICANT CHANGE UP (ref 98–107)
CO2 SERPL-SCNC: 20 MMOL/L — LOW (ref 22–31)
CREAT SERPL-MCNC: 0.65 MG/DL — SIGNIFICANT CHANGE UP (ref 0.5–1.3)
EGFR: 116 ML/MIN/1.73M2 — SIGNIFICANT CHANGE UP
EOSINOPHIL # BLD AUTO: 0.02 K/UL — SIGNIFICANT CHANGE UP (ref 0–0.5)
EOSINOPHIL NFR BLD AUTO: 0.1 % — SIGNIFICANT CHANGE UP (ref 0–6)
GLUCOSE SERPL-MCNC: 117 MG/DL — HIGH (ref 70–99)
HCT VFR BLD CALC: 39.9 % — SIGNIFICANT CHANGE UP (ref 39–50)
HGB BLD-MCNC: 13.2 G/DL — SIGNIFICANT CHANGE UP (ref 13–17)
IANC: 17.05 K/UL — HIGH (ref 1.8–7.4)
IMM GRANULOCYTES NFR BLD AUTO: 1.6 % — HIGH (ref 0–0.9)
LACTATE SERPL-SCNC: 3.1 MMOL/L — HIGH (ref 0.5–2)
LYMPHOCYTES # BLD AUTO: 1.72 K/UL — SIGNIFICANT CHANGE UP (ref 1–3.3)
LYMPHOCYTES # BLD AUTO: 8.5 % — LOW (ref 13–44)
MAGNESIUM SERPL-MCNC: 2.4 MG/DL — SIGNIFICANT CHANGE UP (ref 1.6–2.6)
MCHC RBC-ENTMCNC: 28.4 PG — SIGNIFICANT CHANGE UP (ref 27–34)
MCHC RBC-ENTMCNC: 33.1 GM/DL — SIGNIFICANT CHANGE UP (ref 32–36)
MCV RBC AUTO: 85.8 FL — SIGNIFICANT CHANGE UP (ref 80–100)
MONOCYTES # BLD AUTO: 1.12 K/UL — HIGH (ref 0–0.9)
MONOCYTES NFR BLD AUTO: 5.5 % — SIGNIFICANT CHANGE UP (ref 2–14)
NEUTROPHILS # BLD AUTO: 17.05 K/UL — HIGH (ref 1.8–7.4)
NEUTROPHILS NFR BLD AUTO: 84 % — HIGH (ref 43–77)
NRBC # BLD: 0 /100 WBCS — SIGNIFICANT CHANGE UP (ref 0–0)
NRBC # FLD: 0 K/UL — SIGNIFICANT CHANGE UP (ref 0–0)
PHOSPHATE SERPL-MCNC: 3.3 MG/DL — SIGNIFICANT CHANGE UP (ref 2.5–4.5)
PLATELET # BLD AUTO: 462 K/UL — HIGH (ref 150–400)
POTASSIUM SERPL-MCNC: 4.1 MMOL/L — SIGNIFICANT CHANGE UP (ref 3.5–5.3)
POTASSIUM SERPL-SCNC: 4.1 MMOL/L — SIGNIFICANT CHANGE UP (ref 3.5–5.3)
RBC # BLD: 4.65 M/UL — SIGNIFICANT CHANGE UP (ref 4.2–5.8)
RBC # FLD: 13.7 % — SIGNIFICANT CHANGE UP (ref 10.3–14.5)
SODIUM SERPL-SCNC: 136 MMOL/L — SIGNIFICANT CHANGE UP (ref 135–145)
WBC # BLD: 20.31 K/UL — HIGH (ref 3.8–10.5)
WBC # FLD AUTO: 20.31 K/UL — HIGH (ref 3.8–10.5)

## 2024-10-12 RX ORDER — SODIUM CHLORIDE 0.9 % (FLUSH) 0.9 %
1000 SYRINGE (ML) INJECTION
Refills: 0 | Status: DISCONTINUED | OUTPATIENT
Start: 2024-10-12 | End: 2024-10-13

## 2024-10-12 RX ORDER — METOPROLOL TARTRATE 50 MG
5 TABLET ORAL ONCE
Refills: 0 | Status: COMPLETED | OUTPATIENT
Start: 2024-10-12 | End: 2024-10-12

## 2024-10-12 RX ADMIN — Medication 100 MILLILITER(S): at 13:34

## 2024-10-12 RX ADMIN — Medication 50 MILLIGRAM(S): at 23:06

## 2024-10-12 RX ADMIN — CHLORHEXIDINE GLUCONATE ORAL RINSE 1 APPLICATION(S): 1.2 SOLUTION DENTAL at 13:34

## 2024-10-12 RX ADMIN — Medication 650 MILLIGRAM(S): at 05:04

## 2024-10-12 RX ADMIN — APIXABAN 5 MILLIGRAM(S): 5 TABLET, FILM COATED ORAL at 05:17

## 2024-10-12 RX ADMIN — Medication 5 MILLIGRAM(S): at 13:28

## 2024-10-12 RX ADMIN — APIXABAN 5 MILLIGRAM(S): 5 TABLET, FILM COATED ORAL at 17:23

## 2024-10-12 RX ADMIN — Medication 50 MILLIGRAM(S): at 14:07

## 2024-10-12 RX ADMIN — CEFEPIME 100 MILLIGRAM(S): 2 INJECTION, POWDER, FOR SOLUTION INTRAVENOUS at 23:07

## 2024-10-12 RX ADMIN — CEFEPIME 100 MILLIGRAM(S): 2 INJECTION, POWDER, FOR SOLUTION INTRAVENOUS at 14:06

## 2024-10-12 RX ADMIN — Medication 650 MILLIGRAM(S): at 02:52

## 2024-10-12 RX ADMIN — PANTOPRAZOLE SODIUM 40 MILLIGRAM(S): 40 TABLET, DELAYED RELEASE ORAL at 05:20

## 2024-10-12 RX ADMIN — Medication 50 MILLIGRAM(S): at 05:18

## 2024-10-12 RX ADMIN — CEFEPIME 100 MILLIGRAM(S): 2 INJECTION, POWDER, FOR SOLUTION INTRAVENOUS at 05:19

## 2024-10-12 RX ADMIN — PREDNISONE 40 MILLIGRAM(S): 5 TABLET ORAL at 05:17

## 2024-10-12 NOTE — PROGRESS NOTE ADULT - SUBJECTIVE AND OBJECTIVE BOX
SUBJECTIVE / OVERNIGHT EVENTS:pt seen and examined    MEDICATIONS  (STANDING):  apixaban 5 milliGRAM(s) Oral every 12 hours  cefepime   IVPB 2000 milliGRAM(s) IV Intermittent every 8 hours  chlorhexidine 2% Cloths 1 Application(s) Topical daily  metoprolol tartrate 50 milliGRAM(s) Oral every 8 hours  pantoprazole    Tablet 40 milliGRAM(s) Oral before breakfast  sodium chloride 0.9%. 1000 milliLiter(s) (100 mL/Hr) IV Continuous <Continuous>    MEDICATIONS  (PRN):  acetaminophen     Tablet .. 650 milliGRAM(s) Oral every 6 hours PRN Temp greater or equal to 38C (100.4F), Mild Pain (1 - 3)  guaiFENesin  milliGRAM(s) Oral every 12 hours PRN Cough  ketorolac   Injectable 15 milliGRAM(s) IV Push every 6 hours PRN Mild Pain (1 - 3)    Vital Signs Last 24 Hrs  T(C): 36.7 (10-12-24 @ 17:25), Max: 37 (10-12-24 @ 04:35)  T(F): 98 (10-12-24 @ 17:25), Max: 98.6 (10-12-24 @ 04:35)  HR: 82 (10-12-24 @ 17:25) (60 - 134)  BP: 120/77 (10-12-24 @ 17:25) (118/83 - 131/86)  BP(mean): --  RR: 17 (10-12-24 @ 17:25) (17 - 18)  SpO2: 96% (10-12-24 @ 17:25) (95% - 99%)        Constitutional: No fever, fatigue  Skin: No rash.  Eyes: No recent vision problems or eye pain.  ENT: No congestion, ear pain, or sore throat.  Cardiovascular: No chest pain or palpation.  Respiratory: No cough, shortness of breath, congestion, or wheezing.  Gastrointestinal: No abdominal pain, nausea, vomiting, or diarrhea.  Genitourinary: No dysuria.  Musculoskeletal: No joint swelling.  Neurologic: No headache.    PHYSICAL EXAM:  GENERAL: NAD  EYES: EOMI, PERRLA  NECK: Supple, No JVD  CHEST/LUNG: dec breath sounds at bases  HEART:  S1 , S2 +  ABDOMEN: soft , bs+  EXTREMITIES:  edama  NEUROLOGY:alert awake    LABS:  10-12    136  |  103  |  13  ----------------------------<  117[H]  4.1   |  20[L]  |  0.65    Ca    8.5      12 Oct 2024 08:49  Phos  3.3     10-12  Mg     2.40     10-12      Creatinine Trend: 0.65 <--, 0.80 <--, 0.58 <--, 0.61 <--, 0.60 <--, 0.78 <--                        13.2   20.31 )-----------( 462      ( 12 Oct 2024 08:49 )             39.9     Urine Studies:  Urinalysis Basic - ( 12 Oct 2024 08:49 )    Color:  / Appearance:  / SG:  / pH:   Gluc: 117 mg/dL / Ketone:   / Bili:  / Urobili:    Blood:  / Protein:  / Nitrite:    Leuk Esterase:  / RBC:  / WBC    Sq Epi:  / Non Sq Epi:  / Bacteria:                   RADIOLOGY & ADDITIONAL TESTS:    Imaging Personally Reviewed:    Consultant(s) Notes Reviewed:      Care Discussed with Consultants/Other Providers:

## 2024-10-12 NOTE — PROGRESS NOTE ADULT - SUBJECTIVE AND OBJECTIVE BOX
PATIENT SEEN AND EXAMINED BY ALMAS HASSAN M.D. ON :- 10/12/24  DATE OF SERVICE:   10/12/24          Interim events noted,Labs ,Radiological studies and Cardiology tests reviewed .    Patient is a 49y old  Male who presents with a chief complaint of sepsis 2/2 pna (12 Oct 2024 11:18)      HPI:  50 yo m with no reported PMH p/w fever, cough for last 2-3 weeks. Completed 7 day course azithromycin, and currently on steroid course (day 9/14) with no symptomatic improvement. Found to be in new-onset rapid afib; currently ranging from 120s-140s. Has received 4.5 L IVF, vanc/cefepime, tylenol, Toradol Pt also ebdorses some anxiety.  Home tmax 103. wbc 25 k. CT chest IV  + for LLL pna, 1.7cm left thyroid nodule. TSH, T3, FT4 wnl   Pt with no cardiac history no h/o htn, stroke or dm.   Notes mild covid 2 months ago  Mini rvp negative (09 Oct 2024 06:17)      PAST MEDICAL & SURGICAL HISTORY:  No pertinent past medical history      No significant past surgical history          PREVIOUS DIAGNOSTIC TESTING:      ECHO  FINDINGS:    STRESS  FINDINGS:    CATHETERIZATION  FINDINGS:    MEDICATIONS  (STANDING):  apixaban 5 milliGRAM(s) Oral every 12 hours  cefepime   IVPB 2000 milliGRAM(s) IV Intermittent every 8 hours  chlorhexidine 2% Cloths 1 Application(s) Topical daily  metoprolol tartrate 50 milliGRAM(s) Oral every 8 hours  pantoprazole    Tablet 40 milliGRAM(s) Oral before breakfast  sodium chloride 0.9%. 1000 milliLiter(s) (100 mL/Hr) IV Continuous <Continuous>    MEDICATIONS  (PRN):  acetaminophen     Tablet .. 650 milliGRAM(s) Oral every 6 hours PRN Temp greater or equal to 38C (100.4F), Mild Pain (1 - 3)  guaiFENesin  milliGRAM(s) Oral every 12 hours PRN Cough  ketorolac   Injectable 15 milliGRAM(s) IV Push every 6 hours PRN Mild Pain (1 - 3)      FAMILY HISTORY:      SOCIAL HISTORY:    CIGARETTES:    ALCOHOL:    REVIEW OF SYSTEMS:  CONSTITUTIONAL: No fever, weight loss, or fatigue  EYES: No eye pain, visual disturbances, or discharge  ENMT:  No difficulty hearing, tinnitus, vertigo; No sinus or throat pain  NECK: No pain or stiffness  RESPIRATORY: No cough, wheezing, chills or hemoptysis; No shortness of breath  CARDIOVASCULAR: No chest pain, palpitations, dizziness, or leg swelling  GASTROINTESTINAL: No abdominal or epigastric pain. No nausea, vomiting, or hematemesis; No diarrhea or constipation. No melena or hematochezia.  GENITOURINARY: No dysuria, frequency, hematuria, or incontinence  NEUROLOGICAL: No headaches, memory loss, loss of strength, numbness, or tremors  SKIN: No itching, burning, rashes, or lesions   LYMPH NODES: No enlarged glands  ENDOCRINE: No heat or cold intolerance; No hair loss  MUSCULOSKELETAL: No joint pain or swelling; No muscle, back, or extremity pain  PSYCHIATRIC: No depression, anxiety, mood swings, or difficulty sleeping  HEME/LYMPH: No easy bruising, or bleeding gums  ALLERY AND IMMUNOLOGIC: No hives or eczema    Vital Signs Last 24 Hrs  T(C): 36.7 (12 Oct 2024 17:25), Max: 37 (12 Oct 2024 04:35)  T(F): 98 (12 Oct 2024 17:25), Max: 98.6 (12 Oct 2024 04:35)  HR: 82 (12 Oct 2024 17:25) (60 - 134)  BP: 120/77 (12 Oct 2024 17:25) (118/83 - 131/86)  BP(mean): --  RR: 17 (12 Oct 2024 17:25) (17 - 18)  SpO2: 96% (12 Oct 2024 17:25) (95% - 99%)    Parameters below as of 12 Oct 2024 17:25  Patient On (Oxygen Delivery Method): room air          PHYSICAL EXAM:  GENERAL: NAD, well-groomed, well-developed  HEAD:  Atraumatic, Normocephalic  EYES: EOMI, PERRLA, conjunctiva and sclera clear  ENMT: No tonsillar erythema, exudates, or enlargement; Moist mucous membranes, Good dentition, No lesions  NECK: Supple, No JVD, Normal thyroid  NERVOUS SYSTEM:  Alert & Oriented X3, Good concentration; Motor Strength 5/5 B/L upper and lower extremities; DTRs 2+ intact and symmetric  CHEST/LUNG: Clear to percussion bilaterally; No rales, rhonchi, wheezing, or rubs  HEART: Regular rate and rhythm; No murmurs, rubs, or gallops  ABDOMEN: Soft, Nontender, Nondistended; Bowel sounds present  EXTREMITIES:  2+ Peripheral Pulses, No clubbing, cyanosis, or edema  LYMPH: No lymphadenopathy noted  SKIN: No rashes or lesions      INTERPRETATION OF TELEMETRY:    ECG:    LOGAN:     LABS:                        13.2   20.31 )-----------( 462      ( 12 Oct 2024 08:49 )             39.9     10-12    136  |  103  |  13  ----------------------------<  117[H]  4.1   |  20[L]  |  0.65    Ca    8.5      12 Oct 2024 08:49  Phos  3.3     10-12  Mg     2.40     10-12            Urinalysis Basic - ( 12 Oct 2024 08:49 )    Color: x / Appearance: x / SG: x / pH: x  Gluc: 117 mg/dL / Ketone: x  / Bili: x / Urobili: x   Blood: x / Protein: x / Nitrite: x   Leuk Esterase: x / RBC: x / WBC x   Sq Epi: x / Non Sq Epi: x / Bacteria: x      Lipid Panel:   I&O's Summary    11 Oct 2024 07:01  -  12 Oct 2024 07:00  --------------------------------------------------------  IN: 400 mL / OUT: 0 mL / NET: 400 mL    12 Oct 2024 07:01  -  12 Oct 2024 22:45  --------------------------------------------------------  IN: 1780 mL / OUT: 0 mL / NET: 1780 mL        RADIOLOGY & ADDITIONAL STUDIES:      TRANSTHORACIC ECHOCARDIOGRAM REPORT  ________________________________________________________________________________                                      _______       Pt. Name:       AMADOU TAMEZ Study Date:    10/9/2024  MRN:            [id number]          YOB: 1975  Accession #:    [id number]          Age:           49 years  Account#:       [id number]           Gender:        M  Heart Rate:                        Height:        72.00 in (182.88 cm)  Rhythm:          Weight:        230.00 lb (104.33 kg)  Blood Pressure: 133/90 mmHg        BSA/BMI:       2.26 m² / 31.19 kg/m²  ________________________________________________________________________________________  Referring Physician:    [id number] Lg Terry  Interpreting Physician: Nadir Posada M.D.  Primary Sonographer:    Shani Herzog [facility]    CPT:               ECHO TTE WO CON COMP W DOPP - 85915.m  Indication(s):     Abnormal electrocardiogram ECG/EKG - R94.31; Unspecified         atrial fibrillation - I48.91  Procedure:         Transthoracic echocardiogram with 2-D, M-mode and complete                     spectral and color flow Doppler.  Ordering Location: [facility]  Admission Status:  Inpatient  Study Information: Image quality for this study is fair.    _______________________________________________________________________________________     CONCLUSIONS:      1. Left ventricular cavity is normal in size. Left ventricular wall thickness is normal. Left ventricular systolic function is normal with an ejection fraction of 61 % by Jaquez's method of disks. There are no regional wall motion abnormalities seen.   2. Normal right ventricular cavity size and normal right ventricular systolic function. Tricuspid annular plane systolic excursion (TAPSE) is 2.2 cm (normal >=1.7 cm).   3. Structurally normal mitral valve with normal leaflet excursion. There is trace mitral regurgitation.    ________________________________________________________________________________________  FINDINGS:     Left Ventricle:  The left ventricular cavity is normal in size. Left ventricular wall thickness is normal. Left ventricular systolic function is normal with a calculated ejection fraction of 61 % by the Jaquez's biplane method of disks. There are no regional wall motion abnormalities seen.     Right Ventricle:  The right ventricular cavity is normal in size and right ventricular systolic function is normal. Tricuspid annular plane systolic excursion (TAPSE) is 2.2 cm (normal >=1.7 cm).     Left Atrium:  The left atrium is normal in size with an indexed volume of 24.53 ml/m².     Right Atrium:  The right atrium is normal in size with an indexed volume of 14.60 ml/m² and an indexed area of 6.18 cm²/m².     Aortic Valve:  The aortic valve anatomy cannot be determined. There is no evidence of aortic regurgitation.     Mitral Valve:  Structurally normal mitral valve with normal leaflet excursion. There is trace mitral regurgitation.     Tricuspid Valve:  Structurally normal tricuspid valve with normal leaflet excursion. There is mild tricuspid regurgitation.     Pulmonic Valve:  Structurally normal pulmonic valve with normal leaflet excursion. There is no evidence of pulmonic regurgitation.     Aorta:  The aortic root at the sinuses of Valsalva is normal in size, measuring 3.60 cm (indexed 1.59 cm/m²). The ascending aorta is normal in size, measuring 3.20 cm (indexed 1.42 cm/m²).     Pericardium:  No pericardial effusion seen.     Systemic Veins:  The inferiorvena cava is normal in size (normal <2.1cm) with abnormal inspiratory collapse (abnormal <50%) consistent with mildly elevated right atrial pressure (~8, range 5-10mmHg).  ____________________________________________________________________  QUANTITATIVE DATA:  Left Ventricle Measurements: (Indexed to BSA)     IVSd (2D):   0.9 cm  LVPWd (2D):  0.8 cm  LVIDd (2D):  5.6 cm  LVIDs (2D):  2.5 cm  LV Mass:     178 g  78.8 g/m²  LV Vol d, MOD A2C: 56.2 ml 24.85 ml/m²  LV Vol d, MOD A4C: 56.2 ml 24.86 ml/m²  LV Vol d, MOD BP:  57.1 ml 25.25 ml/m²  LV Vol s, MOD A2C: 25.9 ml 11.47 ml/m²  LV Vol s, MOD A4C: 15.3 ml 6.76 ml/m²  LV Vol s, MOD BP:  22.2 ml 9.80 ml/m²  LVOT SV MOD BP:    34.9 ml  LV EF% MOD BP:     61 %     e' lateral: 16.91 cm/s  e' medial:  16.50 cm/s    Aorta Measurements: (Normal range) (Indexed to BSA)     Ao Root d    3.60 cm (3.1 - 3.7 cm) 1.59 cm/m²  Ao Root      3.6 cm  Ao Asc:      3.2 cm  Ao Asc prox: 3.20 cm                1.42 cm/m²            Left Atrium Measurements: (Indexed to BSA)  LA Diam 2D:        2.95 cm  LA Vol s, MOD A4C: 38.88 ml.  LA Vol s, MOD A2C: 71.31 ml.  LA Vol s, MOD BP:  55.45 ml  24.53 ml/m²         Right Ventricle Measurements: Right Atrial Measurements:     TAPSE:            2.2 cm      RA Vol:      33.00 ml  RV S' Vmax:       25.24 cm/s  RA Vol Index: 14.60 ml/m²  RV Major (RVID3): 7.7 cm       LVOT / RVOT/ Qp/Qs Data: (Indexed to BSA)  LVOT Diameter:  2.06 cm  LVOT Area:      3.35 cm²  LVOT Vmax:      0.96 m/s  LVOT VTI:       17.71 cm  LVOT peak grad: 4 mmHg  LVOT mean grad: 2.0 mmHg  LVOT SV:        59.2 ml  26.21 ml/m²       Tricuspid Valve Measurements:     RA Pressure: 8 mmHg    ________________________________________________________________________________________  Electronically signed on 10/9/2024 at 12:15:00 PM by Nadir Posada M.D.         *** Final ***

## 2024-10-13 LAB
ALBUMIN SERPL ELPH-MCNC: 3.1 G/DL — LOW (ref 3.3–5)
ALP SERPL-CCNC: 87 U/L — SIGNIFICANT CHANGE UP (ref 40–120)
ALT FLD-CCNC: 44 U/L — HIGH (ref 4–41)
ANION GAP SERPL CALC-SCNC: 13 MMOL/L — SIGNIFICANT CHANGE UP (ref 7–14)
ANION GAP SERPL CALC-SCNC: 13 MMOL/L — SIGNIFICANT CHANGE UP (ref 7–14)
APPEARANCE UR: CLEAR — SIGNIFICANT CHANGE UP
AST SERPL-CCNC: 14 U/L — SIGNIFICANT CHANGE UP (ref 4–40)
BACTERIA # UR AUTO: NEGATIVE /HPF — SIGNIFICANT CHANGE UP
BASOPHILS # BLD AUTO: 0.07 K/UL — SIGNIFICANT CHANGE UP (ref 0–0.2)
BASOPHILS # BLD AUTO: 0.1 K/UL — SIGNIFICANT CHANGE UP (ref 0–0.2)
BASOPHILS NFR BLD AUTO: 0.4 % — SIGNIFICANT CHANGE UP (ref 0–2)
BASOPHILS NFR BLD AUTO: 0.5 % — SIGNIFICANT CHANGE UP (ref 0–2)
BILIRUB SERPL-MCNC: <0.2 MG/DL — SIGNIFICANT CHANGE UP (ref 0.2–1.2)
BILIRUB UR-MCNC: NEGATIVE — SIGNIFICANT CHANGE UP
BUN SERPL-MCNC: 12 MG/DL — SIGNIFICANT CHANGE UP (ref 7–23)
BUN SERPL-MCNC: 9 MG/DL — SIGNIFICANT CHANGE UP (ref 7–23)
CALCIUM SERPL-MCNC: 8.4 MG/DL — SIGNIFICANT CHANGE UP (ref 8.4–10.5)
CALCIUM SERPL-MCNC: 8.7 MG/DL — SIGNIFICANT CHANGE UP (ref 8.4–10.5)
CAST: 0 /LPF — SIGNIFICANT CHANGE UP (ref 0–4)
CHLORIDE SERPL-SCNC: 103 MMOL/L — SIGNIFICANT CHANGE UP (ref 98–107)
CHLORIDE SERPL-SCNC: 103 MMOL/L — SIGNIFICANT CHANGE UP (ref 98–107)
CO2 SERPL-SCNC: 21 MMOL/L — LOW (ref 22–31)
CO2 SERPL-SCNC: 22 MMOL/L — SIGNIFICANT CHANGE UP (ref 22–31)
COLOR SPEC: YELLOW — SIGNIFICANT CHANGE UP
CREAT SERPL-MCNC: 0.68 MG/DL — SIGNIFICANT CHANGE UP (ref 0.5–1.3)
CREAT SERPL-MCNC: 0.75 MG/DL — SIGNIFICANT CHANGE UP (ref 0.5–1.3)
CULTURE RESULTS: SIGNIFICANT CHANGE UP
CULTURE RESULTS: SIGNIFICANT CHANGE UP
DIFF PNL FLD: NEGATIVE — SIGNIFICANT CHANGE UP
EGFR: 111 ML/MIN/1.73M2 — SIGNIFICANT CHANGE UP
EGFR: 114 ML/MIN/1.73M2 — SIGNIFICANT CHANGE UP
EOSINOPHIL # BLD AUTO: 0.09 K/UL — SIGNIFICANT CHANGE UP (ref 0–0.5)
EOSINOPHIL # BLD AUTO: 0.09 K/UL — SIGNIFICANT CHANGE UP (ref 0–0.5)
EOSINOPHIL NFR BLD AUTO: 0.4 % — SIGNIFICANT CHANGE UP (ref 0–6)
EOSINOPHIL NFR BLD AUTO: 0.5 % — SIGNIFICANT CHANGE UP (ref 0–6)
GAS PNL BLDV: SIGNIFICANT CHANGE UP
GLUCOSE SERPL-MCNC: 112 MG/DL — HIGH (ref 70–99)
GLUCOSE SERPL-MCNC: 99 MG/DL — SIGNIFICANT CHANGE UP (ref 70–99)
GLUCOSE UR QL: NEGATIVE MG/DL — SIGNIFICANT CHANGE UP
HCT VFR BLD CALC: 38.7 % — LOW (ref 39–50)
HCT VFR BLD CALC: 40.7 % — SIGNIFICANT CHANGE UP (ref 39–50)
HGB BLD-MCNC: 13 G/DL — SIGNIFICANT CHANGE UP (ref 13–17)
HGB BLD-MCNC: 13.6 G/DL — SIGNIFICANT CHANGE UP (ref 13–17)
IANC: 11.91 K/UL — HIGH (ref 1.8–7.4)
IANC: 15.51 K/UL — HIGH (ref 1.8–7.4)
IMM GRANULOCYTES NFR BLD AUTO: 2.4 % — HIGH (ref 0–0.9)
IMM GRANULOCYTES NFR BLD AUTO: 3.7 % — HIGH (ref 0–0.9)
KETONES UR-MCNC: NEGATIVE MG/DL — SIGNIFICANT CHANGE UP
LACTATE SERPL-SCNC: 2.5 MMOL/L — HIGH (ref 0.5–2)
LEUKOCYTE ESTERASE UR-ACNC: NEGATIVE — SIGNIFICANT CHANGE UP
LYMPHOCYTES # BLD AUTO: 18.4 % — SIGNIFICANT CHANGE UP (ref 13–44)
LYMPHOCYTES # BLD AUTO: 19.1 % — SIGNIFICANT CHANGE UP (ref 13–44)
LYMPHOCYTES # BLD AUTO: 3.3 K/UL — SIGNIFICANT CHANGE UP (ref 1–3.3)
LYMPHOCYTES # BLD AUTO: 4.02 K/UL — HIGH (ref 1–3.3)
MAGNESIUM SERPL-MCNC: 2.4 MG/DL — SIGNIFICANT CHANGE UP (ref 1.6–2.6)
MAGNESIUM SERPL-MCNC: 2.4 MG/DL — SIGNIFICANT CHANGE UP (ref 1.6–2.6)
MCHC RBC-ENTMCNC: 28.6 PG — SIGNIFICANT CHANGE UP (ref 27–34)
MCHC RBC-ENTMCNC: 28.7 PG — SIGNIFICANT CHANGE UP (ref 27–34)
MCHC RBC-ENTMCNC: 33.4 GM/DL — SIGNIFICANT CHANGE UP (ref 32–36)
MCHC RBC-ENTMCNC: 33.6 GM/DL — SIGNIFICANT CHANGE UP (ref 32–36)
MCV RBC AUTO: 85.2 FL — SIGNIFICANT CHANGE UP (ref 80–100)
MCV RBC AUTO: 85.9 FL — SIGNIFICANT CHANGE UP (ref 80–100)
MONOCYTES # BLD AUTO: 1.25 K/UL — HIGH (ref 0–0.9)
MONOCYTES # BLD AUTO: 1.63 K/UL — HIGH (ref 0–0.9)
MONOCYTES NFR BLD AUTO: 7.2 % — SIGNIFICANT CHANGE UP (ref 2–14)
MONOCYTES NFR BLD AUTO: 7.4 % — SIGNIFICANT CHANGE UP (ref 2–14)
NEUTROPHILS # BLD AUTO: 11.91 K/UL — HIGH (ref 1.8–7.4)
NEUTROPHILS # BLD AUTO: 15.51 K/UL — HIGH (ref 1.8–7.4)
NEUTROPHILS NFR BLD AUTO: 69.1 % — SIGNIFICANT CHANGE UP (ref 43–77)
NEUTROPHILS NFR BLD AUTO: 70.9 % — SIGNIFICANT CHANGE UP (ref 43–77)
NITRITE UR-MCNC: NEGATIVE — SIGNIFICANT CHANGE UP
NRBC # BLD: 0 /100 WBCS — SIGNIFICANT CHANGE UP (ref 0–0)
NRBC # BLD: 0 /100 WBCS — SIGNIFICANT CHANGE UP (ref 0–0)
NRBC # FLD: 0 K/UL — SIGNIFICANT CHANGE UP (ref 0–0)
NRBC # FLD: 0 K/UL — SIGNIFICANT CHANGE UP (ref 0–0)
PH UR: 7 — SIGNIFICANT CHANGE UP (ref 5–8)
PHOSPHATE SERPL-MCNC: 3.4 MG/DL — SIGNIFICANT CHANGE UP (ref 2.5–4.5)
PHOSPHATE SERPL-MCNC: 3.5 MG/DL — SIGNIFICANT CHANGE UP (ref 2.5–4.5)
PLATELET # BLD AUTO: 498 K/UL — HIGH (ref 150–400)
PLATELET # BLD AUTO: 499 K/UL — HIGH (ref 150–400)
POTASSIUM SERPL-MCNC: 3.7 MMOL/L — SIGNIFICANT CHANGE UP (ref 3.5–5.3)
POTASSIUM SERPL-MCNC: 4.3 MMOL/L — SIGNIFICANT CHANGE UP (ref 3.5–5.3)
POTASSIUM SERPL-SCNC: 3.7 MMOL/L — SIGNIFICANT CHANGE UP (ref 3.5–5.3)
POTASSIUM SERPL-SCNC: 4.3 MMOL/L — SIGNIFICANT CHANGE UP (ref 3.5–5.3)
PROT SERPL-MCNC: 6.5 G/DL — SIGNIFICANT CHANGE UP (ref 6–8.3)
PROT UR-MCNC: NEGATIVE MG/DL — SIGNIFICANT CHANGE UP
RBC # BLD: 4.54 M/UL — SIGNIFICANT CHANGE UP (ref 4.2–5.8)
RBC # BLD: 4.74 M/UL — SIGNIFICANT CHANGE UP (ref 4.2–5.8)
RBC # FLD: 13.3 % — SIGNIFICANT CHANGE UP (ref 10.3–14.5)
RBC # FLD: 13.3 % — SIGNIFICANT CHANGE UP (ref 10.3–14.5)
RBC CASTS # UR COMP ASSIST: 1 /HPF — SIGNIFICANT CHANGE UP (ref 0–4)
SODIUM SERPL-SCNC: 137 MMOL/L — SIGNIFICANT CHANGE UP (ref 135–145)
SODIUM SERPL-SCNC: 138 MMOL/L — SIGNIFICANT CHANGE UP (ref 135–145)
SP GR SPEC: 1.01 — SIGNIFICANT CHANGE UP (ref 1–1.03)
SPECIMEN SOURCE: SIGNIFICANT CHANGE UP
SPECIMEN SOURCE: SIGNIFICANT CHANGE UP
SQUAMOUS # UR AUTO: 0 /HPF — SIGNIFICANT CHANGE UP (ref 0–5)
UROBILINOGEN FLD QL: 0.2 MG/DL — SIGNIFICANT CHANGE UP (ref 0.2–1)
WBC # BLD: 17.26 K/UL — HIGH (ref 3.8–10.5)
WBC # BLD: 21.88 K/UL — HIGH (ref 3.8–10.5)
WBC # FLD AUTO: 17.26 K/UL — HIGH (ref 3.8–10.5)
WBC # FLD AUTO: 21.88 K/UL — HIGH (ref 3.8–10.5)
WBC UR QL: 0 /HPF — SIGNIFICANT CHANGE UP (ref 0–5)

## 2024-10-13 PROCEDURE — 74177 CT ABD & PELVIS W/CONTRAST: CPT | Mod: 26

## 2024-10-13 PROCEDURE — 71260 CT THORAX DX C+: CPT | Mod: 26

## 2024-10-13 RX ORDER — METOPROLOL TARTRATE 50 MG
25 TABLET ORAL ONCE
Refills: 0 | Status: COMPLETED | OUTPATIENT
Start: 2024-10-13 | End: 2024-10-13

## 2024-10-13 RX ORDER — SODIUM CHLORIDE IRRIG SOLUTION 0.9 %
1000 SOLUTION, IRRIGATION IRRIGATION
Refills: 0 | Status: DISCONTINUED | OUTPATIENT
Start: 2024-10-13 | End: 2024-10-14

## 2024-10-13 RX ORDER — METOPROLOL TARTRATE 50 MG
75 TABLET ORAL
Refills: 0 | Status: DISCONTINUED | OUTPATIENT
Start: 2024-10-13 | End: 2024-10-14

## 2024-10-13 RX ADMIN — CEFEPIME 100 MILLIGRAM(S): 2 INJECTION, POWDER, FOR SOLUTION INTRAVENOUS at 16:59

## 2024-10-13 RX ADMIN — Medication 75 MILLILITER(S): at 20:01

## 2024-10-13 RX ADMIN — Medication 25 MILLIGRAM(S): at 14:03

## 2024-10-13 RX ADMIN — CHLORHEXIDINE GLUCONATE ORAL RINSE 1 APPLICATION(S): 1.2 SOLUTION DENTAL at 11:39

## 2024-10-13 RX ADMIN — Medication 50 MILLIGRAM(S): at 13:27

## 2024-10-13 RX ADMIN — Medication 75 MILLIGRAM(S): at 21:27

## 2024-10-13 RX ADMIN — APIXABAN 5 MILLIGRAM(S): 5 TABLET, FILM COATED ORAL at 17:32

## 2024-10-13 RX ADMIN — Medication 40 MILLIEQUIVALENT(S): at 11:38

## 2024-10-13 RX ADMIN — APIXABAN 5 MILLIGRAM(S): 5 TABLET, FILM COATED ORAL at 05:56

## 2024-10-13 RX ADMIN — Medication 50 MILLIGRAM(S): at 05:55

## 2024-10-13 RX ADMIN — Medication 75 MILLILITER(S): at 14:04

## 2024-10-13 RX ADMIN — CEFEPIME 100 MILLIGRAM(S): 2 INJECTION, POWDER, FOR SOLUTION INTRAVENOUS at 09:35

## 2024-10-13 RX ADMIN — PREDNISONE 20 MILLIGRAM(S): 5 TABLET ORAL at 05:56

## 2024-10-13 RX ADMIN — PANTOPRAZOLE SODIUM 40 MILLIGRAM(S): 40 TABLET, DELAYED RELEASE ORAL at 07:36

## 2024-10-13 NOTE — PROGRESS NOTE ADULT - PROBLEM SELECTOR PLAN 3
- Will plan for HÉCTOR/DCCV when medically stable as per EP
-overall etiology may include elevated BMI with current sepsis precipitating rapid afib. would obtain tte to r/o SHD.   -given thyroid nodule seen on CT, will ordered thyroid sono; TFTs wnl  -c/w fluid resuscitation; if HR still elevated, would trial 10 IV Cardizem if BP stable  -chadsvasc 0 unless subsequent vitals/labwork/tte demonstrate otherwise  -low modified wells for PE, but will order duplex given new onset afib

## 2024-10-13 NOTE — PROGRESS NOTE ADULT - TIME BILLING
- Review of records, telemetry, vital signs and daily labs.   - General and cardiovascular physical examination.  - Generation of cardiovascular treatment plan and completion of note .  - Coordination of care.      Patient was seen and examined by me on 10/10/24 ,interim events noted,labs and radiology studies reviewed.  Yaniv Estrella MD,FACC.  [address] [facility].  [phone]
- Review of records, telemetry, vital signs and daily labs.   - General and cardiovascular physical examination.  - Generation of cardiovascular treatment plan and completion of note .  - Coordination of care.      Patient was seen and examined by me on 10/13/24 ,interim events noted,labs and radiology studies reviewed.  Yaniv Estrella MD,FACC.  [address] [facility].  [phone]
- Review of records, telemetry, vital signs and daily labs.   - General and cardiovascular physical examination.  - Generation of cardiovascular treatment plan and completion of note .  - Coordination of care.      Patient was seen and examined by me on 10/11/24 ,interim events noted,labs and radiology studies reviewed.  Yaniv Estrella MD,FACC.  [address] [facility].  [phone]
- Review of records, telemetry, vital signs and daily labs.   - General and cardiovascular physical examination.  - Generation of cardiovascular treatment plan and completion of note .  - Coordination of care.      Patient was seen and examined by me on 10/12/24 ,interim events noted,labs and radiology studies reviewed.  Yaniv Estrella MD,FACC.  [address] [facility].  [phone]

## 2024-10-13 NOTE — PROGRESS NOTE ADULT - PROBLEM SELECTOR PLAN 4
- hyperglycemia can be explained by current steroid use  -a1c ordered,   -leukocytosis likely attributable to both steroids use and sepsis; continue to trend
ISS
- hyperglycemia can be explained by current steroid use  -a1c ordered,   -leukocytosis likely attributable to both steroids use and sepsis; continue to trend

## 2024-10-13 NOTE — PROVIDER CONTACT NOTE (OTHER) - ASSESSMENT
Vitals stable on flowsheet. Patient asymptomatic.
Patient is asymptomatic when laying in bed. When ambulating patient has palpitations. vitals: T 98.1, , /79, RR 18, SPO2 99.
patient complaining of feeling chills. Vitals: /80, HR 88, RR 18, SPO2 98%.

## 2024-10-13 NOTE — PROVIDER CONTACT NOTE (OTHER) - SITUATION
4 beats of vtach
Patient having episodes of non sustained RAfib (140s-170s)
patient complaining of feeling chills. Checked rectal temperature, 102.6.

## 2024-10-13 NOTE — PROGRESS NOTE ADULT - SUBJECTIVE AND OBJECTIVE BOX
SUBJECTIVE / OVERNIGHT EVENTS:pt seen and examined    MEDICATIONS  (STANDING):  apixaban 5 milliGRAM(s) Oral every 12 hours  cefepime   IVPB 2000 milliGRAM(s) IV Intermittent every 8 hours  chlorhexidine 2% Cloths 1 Application(s) Topical daily  metoprolol tartrate 75 milliGRAM(s) Oral <User Schedule>  pantoprazole    Tablet 40 milliGRAM(s) Oral before breakfast  predniSONE   Tablet 20 milliGRAM(s) Oral daily  sodium chloride 0.45%. 1000 milliLiter(s) (75 mL/Hr) IV Continuous <Continuous>    MEDICATIONS  (PRN):  acetaminophen     Tablet .. 650 milliGRAM(s) Oral every 6 hours PRN Temp greater or equal to 38C (100.4F), Mild Pain (1 - 3)  guaiFENesin  milliGRAM(s) Oral every 12 hours PRN Cough  ketorolac   Injectable 15 milliGRAM(s) IV Push every 6 hours PRN Mild Pain (1 - 3)    Vital Signs Last 24 Hrs  T(C): 36.7 (10-13-24 @ 14:00), Max: 36.8 (10-13-24 @ 13:25)  T(F): 98 (10-13-24 @ 14:00), Max: 98.3 (10-13-24 @ 13:25)  HR: 129 (10-13-24 @ 14:00) (65 - 129)  BP: 121/73 (10-13-24 @ 14:00) (120/70 - 145/77)  BP(mean): --  RR: 18 (10-13-24 @ 14:00) (17 - 19)  SpO2: 100% (10-13-24 @ 14:00) (96% - 100%)      Constitutional: No fever, fatigue  Skin: No rash.  Eyes: No recent vision problems or eye pain.  ENT: No congestion, ear pain, or sore throat.  Cardiovascular: No chest pain or palpation.  Respiratory: No cough, shortness of breath, congestion, or wheezing.  Gastrointestinal: No abdominal pain, nausea, vomiting, or diarrhea.  Genitourinary: No dysuria.  Musculoskeletal: No joint swelling.  Neurologic: No headache.    PHYSICAL EXAM:  GENERAL: NAD  EYES: EOMI, PERRLA  NECK: Supple, No JVD  CHEST/LUNG: dec breath sounds at bases  HEART:  S1 , S2 +  ABDOMEN: soft , bs+  EXTREMITIES:  edama  NEUROLOGY:alert awake    LABS:  10-13    138  |  103  |  9   ----------------------------<  99  3.7   |  22  |  0.68    Ca    8.7      13 Oct 2024 06:17  Phos  3.4     10-  Mg     2.40     10      Creatinine Trend: 0.68 <--, 0.65 <--, 0.80 <--, 0.58 <--, 0.61 <--, 0.60 <--, 0.78 <--                        13.6   21.88 )-----------( 498      ( 13 Oct 2024 06:17 )             40.7     Urine Studies:  Urinalysis Basic - ( 13 Oct 2024 12:32 )    Color: Yellow / Appearance: Clear / S.011 / pH:   Gluc:  / Ketone: Negative mg/dL  / Bili: Negative / Urobili: 0.2 mg/dL   Blood:  / Protein: Negative mg/dL / Nitrite: Negative   Leuk Esterase: Negative / RBC: 1 /HPF / WBC 0 /HPF   Sq Epi:  / Non Sq Epi: 0 /HPF / Bacteria: Negative /HPF                  Imaging Personally Reviewed:    Consultant(s) Notes Reviewed:      Care Discussed with Consultants/Other Providers:

## 2024-10-13 NOTE — PROGRESS NOTE ADULT - PROBLEM SELECTOR PLAN 2
abx  worsening wbc  ct abdomen , ct chest
-2/2 pna  -c/w vanc, cefepime, fluid resuscitation  -follow cultures

## 2024-10-13 NOTE — PROVIDER CONTACT NOTE (OTHER) - ACTION/TREATMENT ORDERED:
PRN tylenol is for temperature greater than 100.4.
Patient to be placed on bedrest, administer IVP cardizem, take rectal temperature.
Provider notified PO potassium ordered.

## 2024-10-13 NOTE — PROGRESS NOTE ADULT - SUBJECTIVE AND OBJECTIVE BOX
PATIENT SEEN AND EXAMINED BY ALMAS HASSAN M.D. ON :- 10/13/24  DATE OF SERVICE:      10/13/24       Interim events noted,Labs ,Radiological studies and Cardiology tests reviewed .    Patient is a 49y old  Male who presents with a chief complaint of sepsis 2/2 pna (13 Oct 2024 10:44)      HPI:  48 yo m with no reported PMH p/w fever, cough for last 2-3 weeks. Completed 7 day course azithromycin, and currently on steroid course (day 9/14) with no symptomatic improvement. Found to be in new-onset rapid afib; currently ranging from 120s-140s. Has received 4.5 L IVF, vanc/cefepime, tylenol, Toradol Pt also ebdorses some anxiety.  Home tmax 103. wbc 25 k. CT chest IV  + for LLL pna, 1.7cm left thyroid nodule. TSH, T3, FT4 wnl   Pt with no cardiac history no h/o htn, stroke or dm.   Notes mild covid 2 months ago  Mini rvp negative (09 Oct 2024 06:17)      PAST MEDICAL & SURGICAL HISTORY:  No pertinent past medical history      No significant past surgical history          PREVIOUS DIAGNOSTIC TESTING:      ECHO  FINDINGS:    STRESS  FINDINGS:    CATHETERIZATION  FINDINGS:    MEDICATIONS  (STANDING):  apixaban 5 milliGRAM(s) Oral every 12 hours  cefepime   IVPB 2000 milliGRAM(s) IV Intermittent every 8 hours  chlorhexidine 2% Cloths 1 Application(s) Topical daily  metoprolol tartrate 75 milliGRAM(s) Oral <User Schedule>  pantoprazole    Tablet 40 milliGRAM(s) Oral before breakfast  predniSONE   Tablet 20 milliGRAM(s) Oral daily  sodium chloride 0.45%. 1000 milliLiter(s) (75 mL/Hr) IV Continuous <Continuous>    MEDICATIONS  (PRN):  acetaminophen     Tablet .. 650 milliGRAM(s) Oral every 6 hours PRN Temp greater or equal to 38C (100.4F), Mild Pain (1 - 3)  guaiFENesin  milliGRAM(s) Oral every 12 hours PRN Cough  ketorolac   Injectable 15 milliGRAM(s) IV Push every 6 hours PRN Mild Pain (1 - 3)      FAMILY HISTORY:      SOCIAL HISTORY:    CIGARETTES:    ALCOHOL:    REVIEW OF SYSTEMS:  CONSTITUTIONAL: No fever, weight loss, or fatigue  EYES: No eye pain, visual disturbances, or discharge  ENMT:  No difficulty hearing, tinnitus, vertigo; No sinus or throat pain  NECK: No pain or stiffness  RESPIRATORY: No cough, wheezing, chills or hemoptysis; No shortness of breath  CARDIOVASCULAR: No chest pain, palpitations, dizziness, or leg swelling  GASTROINTESTINAL: No abdominal or epigastric pain. No nausea, vomiting, or hematemesis; No diarrhea or constipation. No melena or hematochezia.  GENITOURINARY: No dysuria, frequency, hematuria, or incontinence  NEUROLOGICAL: No headaches, memory loss, loss of strength, numbness, or tremors  SKIN: No itching, burning, rashes, or lesions   LYMPH NODES: No enlarged glands  ENDOCRINE: No heat or cold intolerance; No hair loss  MUSCULOSKELETAL: No joint pain or swelling; No muscle, back, or extremity pain  PSYCHIATRIC: No depression, anxiety, mood swings, or difficulty sleeping  HEME/LYMPH: No easy bruising, or bleeding gums  ALLERY AND IMMUNOLOGIC: No hives or eczema    Vital Signs Last 24 Hrs  T(C): 36.7 (13 Oct 2024 18:10), Max: 36.8 (13 Oct 2024 13:25)  T(F): 98.1 (13 Oct 2024 18:10), Max: 98.3 (13 Oct 2024 13:25)  HR: 91 (13 Oct 2024 18:10) (65 - 129)  BP: 129/76 (13 Oct 2024 18:10) (120/70 - 145/77)  BP(mean): --  RR: 19 (13 Oct 2024 18:10) (18 - 19)  SpO2: 100% (13 Oct 2024 18:10) (96% - 100%)    Parameters below as of 13 Oct 2024 18:10  Patient On (Oxygen Delivery Method): room air          PHYSICAL EXAM:  GENERAL: NAD, well-groomed, well-developed  HEAD:  Atraumatic, Normocephalic  EYES: EOMI, PERRLA, conjunctiva and sclera clear  ENMT: No tonsillar erythema, exudates, or enlargement; Moist mucous membranes, Good dentition, No lesions  NECK: Supple, No JVD, Normal thyroid  NERVOUS SYSTEM:  Alert & Oriented X3, Good concentration; Motor Strength 5/5 B/L upper and lower extremities; DTRs 2+ intact and symmetric  CHEST/LUNG: Clear to percussion bilaterally; No rales, rhonchi, wheezing, or rubs  HEART: Regular rate and rhythm; No murmurs, rubs, or gallops  ABDOMEN: Soft, Nontender, Nondistended; Bowel sounds present  EXTREMITIES:  2+ Peripheral Pulses, No clubbing, cyanosis, or edema  LYMPH: No lymphadenopathy noted  SKIN: No rashes or lesions      INTERPRETATION OF TELEMETRY:    ECG:    CHADSVASC:     LABS:                        13.0   17.26 )-----------( 499      ( 13 Oct 2024 19:31 )             38.7     10-13    137  |  103  |  12  ----------------------------<  112[H]  4.3   |  21[L]  |  0.75    Ca    8.4      13 Oct 2024 19:31  Phos  3.5     10-13  Mg     2.40     10-13    TPro  6.5  /  Alb  3.1[L]  /  TBili  <0.2  /  DBili  x   /  AST  14  /  ALT  44[H]  /  AlkPhos  87  10-13          Urinalysis Basic - ( 13 Oct 2024 19:31 )    Color: x / Appearance: x / SG: x / pH: x  Gluc: 112 mg/dL / Ketone: x  / Bili: x / Urobili: x   Blood: x / Protein: x / Nitrite: x   Leuk Esterase: x / RBC: x / WBC x   Sq Epi: x / Non Sq Epi: x / Bacteria: x      Lipid Panel:   I&O's Summary    12 Oct 2024 07:01  -  13 Oct 2024 07:00  --------------------------------------------------------  IN: 1780 mL / OUT: 0 mL / NET: 1780 mL        RADIOLOGY & ADDITIONAL STUDIES:    CONCLUSIONS:      1. Left ventricular cavity is normal in size. Left ventricular wall thickness is normal. Left ventricular systolic function is normal with an ejection fraction of 61 % by Jaquez's method of disks. There are no regional wall motion abnormalities seen.   2. Normal right ventricular cavity size and normal right ventricular systolic function. Tricuspid annular plane systolic excursion (TAPSE) is 2.2 cm (normal >=1.7 cm).   3. Structurally normal mitral valve with normal leaflet excursion. There is trace mitral regurgitation.

## 2024-10-13 NOTE — PROGRESS NOTE ADULT - PROBLEM SELECTOR PLAN 1
-abx   improving    ID f/u
-abx   improving  f/u cx   ID f/u
-abx   improving    ID f/u
-c/w vanc, cefepime, fluid resuscitation  -follow cultures
-abx   improving  f/u cx   ID f/u

## 2024-10-13 NOTE — PROVIDER CONTACT NOTE (OTHER) - BACKGROUND
Patient admitted for sepsis 2/2 PNA with no pertinent pmhx. Patient also found to have new onset Afib w/ RVR.

## 2024-10-14 ENCOUNTER — TRANSCRIPTION ENCOUNTER (OUTPATIENT)
Age: 49
End: 2024-10-14

## 2024-10-14 VITALS
SYSTOLIC BLOOD PRESSURE: 120 MMHG | DIASTOLIC BLOOD PRESSURE: 70 MMHG | TEMPERATURE: 98 F | HEART RATE: 79 BPM | RESPIRATION RATE: 18 BRPM | OXYGEN SATURATION: 97 %

## 2024-10-14 LAB
ANION GAP SERPL CALC-SCNC: 10 MMOL/L — SIGNIFICANT CHANGE UP (ref 7–14)
BASOPHILS # BLD AUTO: 0.14 K/UL — SIGNIFICANT CHANGE UP (ref 0–0.2)
BASOPHILS NFR BLD AUTO: 0.8 % — SIGNIFICANT CHANGE UP (ref 0–2)
BUN SERPL-MCNC: 14 MG/DL — SIGNIFICANT CHANGE UP (ref 7–23)
CALCIUM SERPL-MCNC: 8.8 MG/DL — SIGNIFICANT CHANGE UP (ref 8.4–10.5)
CHLORIDE SERPL-SCNC: 103 MMOL/L — SIGNIFICANT CHANGE UP (ref 98–107)
CO2 SERPL-SCNC: 24 MMOL/L — SIGNIFICANT CHANGE UP (ref 22–31)
CREAT SERPL-MCNC: 0.78 MG/DL — SIGNIFICANT CHANGE UP (ref 0.5–1.3)
EGFR: 109 ML/MIN/1.73M2 — SIGNIFICANT CHANGE UP
EOSINOPHIL # BLD AUTO: 0.12 K/UL — SIGNIFICANT CHANGE UP (ref 0–0.5)
EOSINOPHIL NFR BLD AUTO: 0.7 % — SIGNIFICANT CHANGE UP (ref 0–6)
GLUCOSE SERPL-MCNC: 82 MG/DL — SIGNIFICANT CHANGE UP (ref 70–99)
HCT VFR BLD CALC: 40.1 % — SIGNIFICANT CHANGE UP (ref 39–50)
HGB BLD-MCNC: 13 G/DL — SIGNIFICANT CHANGE UP (ref 13–17)
IANC: 11.39 K/UL — HIGH (ref 1.8–7.4)
IMM GRANULOCYTES NFR BLD AUTO: 4.6 % — HIGH (ref 0–0.9)
LEGIONELLA AG UR QL: NEGATIVE — SIGNIFICANT CHANGE UP
LYMPHOCYTES # BLD AUTO: 19.7 % — SIGNIFICANT CHANGE UP (ref 13–44)
LYMPHOCYTES # BLD AUTO: 3.3 K/UL — SIGNIFICANT CHANGE UP (ref 1–3.3)
MAGNESIUM SERPL-MCNC: 2.3 MG/DL — SIGNIFICANT CHANGE UP (ref 1.6–2.6)
MCHC RBC-ENTMCNC: 28.1 PG — SIGNIFICANT CHANGE UP (ref 27–34)
MCHC RBC-ENTMCNC: 32.4 GM/DL — SIGNIFICANT CHANGE UP (ref 32–36)
MCV RBC AUTO: 86.8 FL — SIGNIFICANT CHANGE UP (ref 80–100)
MONOCYTES # BLD AUTO: 0.99 K/UL — HIGH (ref 0–0.9)
MONOCYTES NFR BLD AUTO: 5.9 % — SIGNIFICANT CHANGE UP (ref 2–14)
NEUTROPHILS # BLD AUTO: 11.39 K/UL — HIGH (ref 1.8–7.4)
NEUTROPHILS NFR BLD AUTO: 68.3 % — SIGNIFICANT CHANGE UP (ref 43–77)
NRBC # BLD: 0 /100 WBCS — SIGNIFICANT CHANGE UP (ref 0–0)
NRBC # FLD: 0 K/UL — SIGNIFICANT CHANGE UP (ref 0–0)
PHOSPHATE SERPL-MCNC: 4.4 MG/DL — SIGNIFICANT CHANGE UP (ref 2.5–4.5)
PLATELET # BLD AUTO: 484 K/UL — HIGH (ref 150–400)
POTASSIUM SERPL-MCNC: 4 MMOL/L — SIGNIFICANT CHANGE UP (ref 3.5–5.3)
POTASSIUM SERPL-SCNC: 4 MMOL/L — SIGNIFICANT CHANGE UP (ref 3.5–5.3)
RBC # BLD: 4.62 M/UL — SIGNIFICANT CHANGE UP (ref 4.2–5.8)
RBC # FLD: 13.7 % — SIGNIFICANT CHANGE UP (ref 10.3–14.5)
SODIUM SERPL-SCNC: 137 MMOL/L — SIGNIFICANT CHANGE UP (ref 135–145)
WBC # BLD: 16.71 K/UL — HIGH (ref 3.8–10.5)
WBC # FLD AUTO: 16.71 K/UL — HIGH (ref 3.8–10.5)

## 2024-10-14 PROCEDURE — 99231 SBSQ HOSP IP/OBS SF/LOW 25: CPT

## 2024-10-14 RX ORDER — CHOLESTYRAMINE 4 G/9G
4 POWDER, FOR SUSPENSION ORAL DAILY
Refills: 0 | Status: DISCONTINUED | OUTPATIENT
Start: 2024-10-14 | End: 2024-10-14

## 2024-10-14 RX ORDER — PREDNISONE 5 MG/1
1 TABLET ORAL
Qty: 1 | Refills: 0
Start: 2024-10-14 | End: 2024-10-14

## 2024-10-14 RX ORDER — ACETAMINOPHEN 325 MG
2 TABLET ORAL
Qty: 0 | Refills: 0 | DISCHARGE
Start: 2024-10-14

## 2024-10-14 RX ORDER — METOPROLOL TARTRATE 50 MG
1 TABLET ORAL
Qty: 0 | Refills: 0 | DISCHARGE
Start: 2024-10-14

## 2024-10-14 RX ORDER — APIXABAN 5 MG/1
1 TABLET, FILM COATED ORAL
Qty: 60 | Refills: 0
Start: 2024-10-14 | End: 2024-11-12

## 2024-10-14 RX ORDER — CHOLESTYRAMINE 4 G/9G
1 POWDER, FOR SUSPENSION ORAL
Qty: 0 | Refills: 0 | DISCHARGE
Start: 2024-10-14

## 2024-10-14 RX ORDER — METOPROLOL TARTRATE 50 MG
1 TABLET ORAL
Qty: 30 | Refills: 0
Start: 2024-10-14 | End: 2024-11-12

## 2024-10-14 RX ORDER — METOPROLOL TARTRATE 50 MG
200 TABLET ORAL DAILY
Refills: 0 | Status: DISCONTINUED | OUTPATIENT
Start: 2024-10-15 | End: 2024-10-14

## 2024-10-14 RX ADMIN — PANTOPRAZOLE SODIUM 40 MILLIGRAM(S): 40 TABLET, DELAYED RELEASE ORAL at 06:27

## 2024-10-14 RX ADMIN — Medication 500 MILLIGRAM(S): at 14:07

## 2024-10-14 RX ADMIN — CEFEPIME 100 MILLIGRAM(S): 2 INJECTION, POWDER, FOR SOLUTION INTRAVENOUS at 01:08

## 2024-10-14 RX ADMIN — CEFEPIME 100 MILLIGRAM(S): 2 INJECTION, POWDER, FOR SOLUTION INTRAVENOUS at 08:36

## 2024-10-14 RX ADMIN — CHLORHEXIDINE GLUCONATE ORAL RINSE 1 APPLICATION(S): 1.2 SOLUTION DENTAL at 12:09

## 2024-10-14 RX ADMIN — Medication 75 MILLIGRAM(S): at 12:08

## 2024-10-14 RX ADMIN — Medication 75 MILLIGRAM(S): at 06:27

## 2024-10-14 RX ADMIN — PREDNISONE 20 MILLIGRAM(S): 5 TABLET ORAL at 06:26

## 2024-10-14 RX ADMIN — APIXABAN 5 MILLIGRAM(S): 5 TABLET, FILM COATED ORAL at 06:26

## 2024-10-14 NOTE — PROGRESS NOTE ADULT - REASON FOR ADMISSION
Sepsis 2/2 pna

## 2024-10-14 NOTE — PROGRESS NOTE ADULT - NS ATTEND AMEND GEN_ALL_CORE FT
49 year old male with no pertinent PMH presented to the ED with fever, chills, body aches and cough. He is being treated for LL lobe PNA. Patient was also found to be in newly diagnosed atrial fibrillation with RVR. Telemetry showed atrial fibrillation with VR as high as 180s @ times in the setting of fever, chills and elevated WBC. WBC trending down. On IV antibiotics for PNA. Echocardiogram shows normal LV function. Patient self converted to sinus rhythm around 6 pm last night.  Increase metoprolol to 75 mg Q8H and change to metoprolol ER 200mg upon d/c. Continue Eliquis 5 mg BID
HÉCTOR/DCCV when optimized.
Can increase BB to better improve rates. HÉCTOR/DCCV when medically optimized. Still having fevers.

## 2024-10-14 NOTE — DISCHARGE NOTE PROVIDER - CARE PROVIDERS DIRECT ADDRESSES
[email or web address],DirectAddress_Unknown

## 2024-10-14 NOTE — DISCHARGE NOTE PROVIDER - NSDCCPCAREPLAN_GEN_ALL_CORE_FT
PRINCIPAL DISCHARGE DIAGNOSIS  Diagnosis: Pneumonia  Assessment and Plan of Treatment: A course of antibiotics was initiated during your hospital stay. Monitor for worsening of disease, such as, increased cough/sputum, difficulty breathing, fever/chills, or changes in mental status. Follow-up with your primary care provider as an outpatient for further medical care/recommendations. Follow up with infectious disease Dr. Aiyana Call as well.      SECONDARY DISCHARGE DIAGNOSES  Diagnosis: Afib  Assessment and Plan of Treatment: Please continue your medications as directed and follow-up with your primary provider/cardiologist to further manage your care. Monitor for signs/symptoms of uncontrolled atrial fibrillation, such as, increased heart rate, palpitations, chest pain, dizziness, or shortness of breath - Return to emergency room if these signs/symptoms are present. Follow up with electrophysiology Dr. Ford upon discharge.    Diagnosis: Thyroid nodule  Assessment and Plan of Treatment: Your CT incidentally showed a thyroid nodule. Your thyroid function panel is in within normal limit.  Please follow up outpatient for a thyroid ultrasound.

## 2024-10-14 NOTE — PROGRESS NOTE ADULT - PROVIDER SPECIALTY LIST ADULT
Cardiology
Infectious Disease
Infectious Disease
Cardiology
Electrophysiology
Infectious Disease
Cardiology
Cardiology
Internal Medicine

## 2024-10-14 NOTE — DISCHARGE NOTE PROVIDER - CARE PROVIDER_API CALL
Johan Sauceda  Internal Medicine  [address], [address]  [place] [address]  Phone: [phone]  Fax: [phone]  Follow Up Time: 2 weeks    Ruchi Ford  Cardiac Electrophysiology  [address] [place] [address], [address]  [place] [address]  Phone: [phone]  Fax: [phone]  Follow Up Time: 2 weeks    Aiyana Call  Infectious Disease  [address] [place] [address], [address]  [place] [address]  Phone: [phone]  Fax: [phone]  Follow Up Time: 2 weeks

## 2024-10-14 NOTE — DISCHARGE NOTE PROVIDER - HOSPITAL COURSE
49M s/p cholecystectomy p/w 3 weeks of fever, chills, body aches and cough, admitted with sepsis 2/2 PNA     Sepsis 2/2 LLL PNA  - CT chest w/ LLL PNA   - BCx NGTD, Sputum Cx normal héctor, Urine Legionella neg   - RVP +Enterorhinovirus also +COVID (likely old viral shedding)  - ID: IV Cefepime w/ Transition to PO Ceftin 500mg BID to complete 7 day course until 10/16, outpatient f/u   - Leukocytosis likely 2/2 PO Prednisone  - BCx x1 sent 10/13/24 per ID can f/u outpatient for results      Afib 2/2 Acute Infection   - Converted to NSR around 10/13 18:00   - EP: c/w eliquis and metoprolol 200mg ER upon d/c, outpatient f/u      Patient is medically optimized for discharge home. Case /labs/medications discussed with Dr. Terry on 10/14/24. 49M s/p cholecystectomy p/w 3 weeks of fever, chills, body aches and cough, admitted with sepsis 2/2 PNA     Sepsis 2/2 LLL PNA  - CT chest w/ LLL PNA   - BCx NGTD, Sputum Cx normal héctor, Urine Legionella neg   - RVP +Enterorhinovirus also +COVID (likely old viral shedding)  - ID: IV Cefepime w/ Transition to PO Ceftin 500mg BID to complete 7 day course until 10/16, outpatient f/u   - Leukocytosis likely 2/2 PO Prednisone  - BCx x1 sent 10/13/24 per ID can f/u outpatient for results      Afib 2/2 Acute Infection   - Converted to NSR around 10/13 18:00   - EP: c/w eliquis ($30 copay, pt amenable to cost) and metoprolol 200mg ER upon d/c, outpatient f/u      Patient is medically optimized for discharge home. Case /labs/medications discussed with Dr. Terry on 10/14/24.

## 2024-10-14 NOTE — DISCHARGE NOTE PROVIDER - PROVIDER TOKENS
PROVIDER:[TOKEN:[[id number]],FOLLOWUP:[2 weeks]],PROVIDER:[TOKEN:[[id number]],FOLLOWUP:[2 weeks]],PROVIDER:[TOKEN:[[id number]],FOLLOWUP:[2 weeks]]

## 2024-10-14 NOTE — DISCHARGE NOTE NURSING/CASE MANAGEMENT/SOCIAL WORK - PATIENT PORTAL LINK FT
You can access the FollowMyHealth Patient Portal offered by [facility] by registering at the following website: [email or web address]. By joining [employer]’s FollowMyHealth portal, you will also be able to view your health information using other applications (apps) compatible with our system.

## 2024-10-14 NOTE — PROGRESS NOTE ADULT - ASSESSMENT
Pt is a 49M w/ PMHx of fever, cough x2-3 wks. s/p azithromycin x7 day course, w/o improvement now on steroid course w/o sx improvement.  reporting possible enterovirus/rhinovirus as a component of illness that was dx as an outpatient. Wife and both daughters also sick    Sepsis 2/2 PNA  Afib 2/2 acute infection  CT chest w/ LLL PNA  10/8, 10/11 BCx NGTD   Sputum cx Normal héctor  urine legionella negative  urine strep pneumo Ag negative  Repeat RVP sent + enterovirus/rhinovirus and COVID  --COVID likely old viral shedding  Afib now resolved    Recommendations:   Can switch to PO ceftin 500mg BID to complete x7 day course until 10/16  Trend temps/WBC--leukocytosis downtrending  IVFs, anti-pyretics, supportive care  Tele monitoring, appreciate cardio recs    Stable from ID standpoint  D/c planning per primary team once medically clear  D/w ACP  Infectious Diseases will follow. Please call with any questions.  Aiyana Call M.D.  [facility] Division of Infectious Diseases [phone]  For after 5 P.M. and weekends, please call [phone] Pt is a 49M w/ PMHx of fever, cough x2-3 wks. s/p azithromycin x7 day course, w/o improvement now on steroid course w/o sx improvement.  reporting possible enterovirus/rhinovirus as a component of illness that was dx as an outpatient. Wife and both daughters also sick    Sepsis 2/2 PNA  Afib 2/2 acute infection  CT chest w/ LLL PNA  10/8, 10/11 BCx NGTD   Sputum cx Normal héctor  urine legionella negative  urine strep pneumo Ag negative  Repeat RVP sent + enterovirus/rhinovirus and COVID  --COVID likely old viral shedding  Afib now resolved    Recommendations:   Can switch to PO ceftin 500mg BID to complete x7 day course until 10/16  Trend temps/WBC--leukocytosis downtrending  IVFs, anti-pyretics, supportive care  Tele monitoring, appreciate cardio recs    Stable from ID standpoint  D/c planning per primary team once medically clear  D/w ACP  D/w Dr. Terry  Infectious Diseases will follow. Please call with any questions.  Aiyana Call M.D.  [facility] Division of Infectious Diseases [phone]  For after 5 P.M. and weekends, please call [phone] Pt is a 49M w/ PMHx of fever, cough x2-3 wks. s/p azithromycin x7 day course, w/o improvement now on steroid course w/o sx improvement.  reporting possible enterovirus/rhinovirus as a component of illness that was dx as an outpatient. Wife and both daughters also sick    Sepsis 2/2 PNA  Afib 2/2 acute infection  CT chest w/ LLL PNA  10/8, 10/11 BCx NGTD   Sputum cx Normal héctor  urine legionella negative  urine strep pneumo Ag negative  Repeat RVP sent + enterovirus/rhinovirus and COVID  --COVID likely old viral shedding  Afib now resolved    Recommendations:   Can switch to PO ceftin 500mg BID to complete x7 day course until 10/16  Trend temps/WBC--leukocytosis high, but downtrending; likely some steroid effect as pt is otherwise clinically improved  IVFs, anti-pyretics, supportive care  Tele monitoring, appreciate cardio recs    Pt can call [phone] to f/u w/ me next week to monitor improvement and repeat labs as outpatient     Stable from ID standpoint  D/c planning per primary team    D/w ACP  D/w Dr. Terry  Infectious Diseases will follow. Please call with any questions.  Aiyana Call M.D.  [facility] Division of Infectious Diseases [phone]  For after 5 P.M. and weekends, please call [phone]

## 2024-10-14 NOTE — PROGRESS NOTE ADULT - SUBJECTIVE AND OBJECTIVE BOX
[facility], Division of Infectious Diseases  BAYRON Mullins Y. Patel, S. Shah, [facility]  [phone]    Name: AMADOU TAMEZ  Age: 49y  Gender: Male  MRN: [id number]    Interval History:  Patient seen and examined at bedside this morning  No acute overnight events. Afebrile  Feeling much better  Weekend events noted  Notes reviewed    Antibiotics:  cefepime   IVPB 2000 milliGRAM(s) IV Intermittent every 8 hours      Medications:  acetaminophen     Tablet .. 650 milliGRAM(s) Oral every 6 hours PRN  apixaban 5 milliGRAM(s) Oral every 12 hours  cefepime   IVPB 2000 milliGRAM(s) IV Intermittent every 8 hours  chlorhexidine 2% Cloths 1 Application(s) Topical daily  guaiFENesin  milliGRAM(s) Oral every 12 hours PRN  ketorolac   Injectable 15 milliGRAM(s) IV Push every 6 hours PRN  metoprolol tartrate 75 milliGRAM(s) Oral <User Schedule>  pantoprazole    Tablet 40 milliGRAM(s) Oral before breakfast  predniSONE   Tablet 20 milliGRAM(s) Oral daily      Review of Systems:  A 10-point review of systems was obtained.     Pertinent positives and negatives--  Constitutional: No fevers. No Chills. No Rigors.   Cardiovascular: No chest pain. No palpitations.  Respiratory: No shortness of breath. No cough.  Gastrointestinal: No nausea or vomiting. No diarrhea or constipation.   Psychiatric: Pleasant. Appropriate affect.    Review of systems otherwise negative except as previously noted.    Allergies: No Known Allergies    For details regarding the patient's past medical history, social history, family history, and other miscellaneous elements, please refer the initial infectious diseases consultation and/or the admitting history and physical examination for this admission.    Objective:  Vitals:   T(C): 36.8 (10-14-24 @ 10:00), Max: 36.9 (10-13-24 @ 22:00)  HR: 78 (10-14-24 @ 10:00) (65 - 129)  BP: 127/80 (10-14-24 @ 10:00) (121/73 - 145/77)  RR: 18 (10-14-24 @ 10:00) (18 - 19)  SpO2: 99% (10-14-24 @ 10:00) (96% - 100%)    Physical Examination:  General: no acute distress  HEENT: NC/AT, EOMI,   Cardio: S1, S2 heard, RRR, no murmurs  Resp: breath sounds heard bilaterally, no rales, wheezes or rhonchi  Abd: soft, NT, ND  Ext: no edema or cyanosis  Skin: warm, dry, no visible rash      Laboratory Studies:  CBC:                       13.0   16.71 )-----------( 484      ( 14 Oct 2024 04:30 )             40.1     CMP: 10-14    137  |  103  |  14  ----------------------------<  82  4.0   |  24  |  0.78    Ca    8.8      14 Oct 2024 04:30  Phos  4.4     10-14  Mg     2.30     10-14    TPro  6.5  /  Alb  3.1[L]  /  TBili  <0.2  /  DBili  x   /  AST  14  /  ALT  44[H]  /  AlkPhos  87  10-13    LIVER FUNCTIONS - ( 13 Oct 2024 19:31 )  Alb: 3.1 g/dL / Pro: 6.5 g/dL / ALK PHOS: 87 U/L / ALT: 44 U/L / AST: 14 U/L / GGT: x           Urinalysis Basic - ( 14 Oct 2024 04:30 )    Color: x / Appearance: x / SG: x / pH: x  Gluc: 82 mg/dL / Ketone: x  / Bili: x / Urobili: x   Blood: x / Protein: x / Nitrite: x   Leuk Esterase: x / RBC: x / WBC x   Sq Epi: x / Non Sq Epi: x / Bacteria: x        Microbiology: reviewed    Culture - Blood (collected 10-11-24 @ 09:20)  Source: .Blood BLOOD  Preliminary Report (10-13-24 @ 13:01):    No growth at 48 Hours    Culture - Blood (collected 10-11-24 @ 09:05)  Source: .Blood BLOOD  Preliminary Report (10-13-24 @ 13:01):    No growth at 48 Hours    Culture - Sputum (collected 10-09-24 @ 20:00)  Source: .Sputum Sputum  Gram Stain (10-10-24 @ 07:47):    Few polymorphonuclear leukocytes per low power field    Few Squamous epithelial cells per low power field    Few Gram Negative Rods seen per oil power field    Rare Gram Positive Rods seen per oil power field    Few Gram Positive Cocci in Clusters seen peroil power field    Rare Gram Positive Cocci in Pairs and Chains seen per oil power field  Final Report (10-11-24 @ 09:02):    Commensal héctor consistent with body site    Urinalysis with Rflx Culture (collected 10-08-24 @ 16:33)    Culture - Blood (collected 10-08-24 @ 13:23)  Source: .Blood BLOOD  Final Report (10-13-24 @ 19:00):    No growth at 5 days    Culture - Blood (collected 10-08-24 @ 13:05)  Source: .Blood BLOOD  Final Report (10-13-24 @ 19:00):    No growth at 5 days          Radiology: reviewed

## 2024-10-14 NOTE — PROGRESS NOTE ADULT - SUBJECTIVE AND OBJECTIVE BOX
Patient is a 49y old  Male who presents with a chief complaint of sepsis 2/2 pna (13 Oct 2024 10:44)      PAST MEDICAL & SURGICAL HISTORY:  No pertinent past medical history    No significant past surgical history        MEDICATIONS  (STANDING):  apixaban 5 milliGRAM(s) Oral every 12 hours  cefepime   IVPB 2000 milliGRAM(s) IV Intermittent every 8 hours  chlorhexidine 2% Cloths 1 Application(s) Topical daily  metoprolol tartrate 75 milliGRAM(s) Oral <User Schedule>  pantoprazole    Tablet 40 milliGRAM(s) Oral before breakfast  predniSONE   Tablet 20 milliGRAM(s) Oral daily    MEDICATIONS  (PRN):  acetaminophen     Tablet .. 650 milliGRAM(s) Oral every 6 hours PRN Temp greater or equal to 38C (100.4F), Mild Pain (1 - 3)  guaiFENesin  milliGRAM(s) Oral every 12 hours PRN Cough  ketorolac   Injectable 15 milliGRAM(s) IV Push every 6 hours PRN Mild Pain (1 - 3)            Vital Signs Last 24 Hrs  T(C): 36.7 (14 Oct 2024 06:00), Max: 36.9 (13 Oct 2024 22:00)  T(F): 98.1 (14 Oct 2024 06:00), Max: 98.5 (13 Oct 2024 22:00)  HR: 90 (14 Oct 2024 06:00) (65 - 129)  BP: 125/73 (14 Oct 2024 06:00) (121/73 - 145/77)  BP(mean): --  RR: 18 (14 Oct 2024 06:00) (18 - 19)  SpO2: 96% (14 Oct 2024 06:00) (96% - 100%)    Parameters below as of 14 Oct 2024 06:00  Patient On (Oxygen Delivery Method): room air                  LABS:                        13.0   16.71 )-----------( 484      ( 14 Oct 2024 04:30 )             40.1     10-14    137  |  103  |  14  ----------------------------<  82  4.0   |  24  |  0.78    Ca    8.8      14 Oct 2024 04:30  Phos  4.4     10-14  Mg     2.30     10-14    TPro  6.5  /  Alb  3.1[L]  /  TBili  <0.2  /  DBili  x   /  AST  14  /  ALT  44[H]  /  AlkPhos  87  10-13          Urinalysis Basic - ( 14 Oct 2024 04:30 )    Color: x / Appearance: x / SG: x / pH: x  Gluc: 82 mg/dL / Ketone: x  / Bili: x / Urobili: x   Blood: x / Protein: x / Nitrite: x   Leuk Esterase: x / RBC: x / WBC x   Sq Epi: x / Non Sq Epi: x / Bacteria: x      I&O's Summary    BNP  RADIOLOGY & ADDITIONAL STUDIES:      PHYSICAL EXAM:    GENERAL: In no apparent distress, well nourished, and hydrated.  HEAD:  Atraumatic, Normocephalic  EYES: EOMI, PERRLA, conjunctiva and sclera clear  ENMT: No tonsillar erythema, exudates, or enlargement; Moist mucous membranes, Good dentition, No lesions  NECK: Supple and normal thyroid.  No JVD or carotid bruit.  Carotid pulse is 2+ bilaterally.  HEART: Regular rate and rhythm; No murmurs, rubs, or gallops.  PULMONARY: Clear to auscultation and percussion.  No rales, wheezing, or rhonchi bilaterally.  ABDOMEN: Soft, Nontender, Nondistended; Bowel sounds present  EXTREMITIES:  2+ Peripheral Pulses, No clubbing, cyanosis, or edema  LYMPH: No lymphadenopathy noted  NEUROLOGICAL: Grossly nonfocal         Patient is seen and examined. He feels better. Denies any chest pain, SOB, palpitations or dizziness  Telemetry shows sinus rhythm with HR 70s-80s      PAST MEDICAL & SURGICAL HISTORY:  No pertinent past medical history    No significant past surgical history        MEDICATIONS  (STANDING):  apixaban 5 milliGRAM(s) Oral every 12 hours  cefepime   IVPB 2000 milliGRAM(s) IV Intermittent every 8 hours  chlorhexidine 2% Cloths 1 Application(s) Topical daily  metoprolol tartrate 75 milliGRAM(s) Oral <User Schedule>  pantoprazole    Tablet 40 milliGRAM(s) Oral before breakfast  predniSONE   Tablet 20 milliGRAM(s) Oral daily    MEDICATIONS  (PRN):  acetaminophen     Tablet .. 650 milliGRAM(s) Oral every 6 hours PRN Temp greater or equal to 38C (100.4F), Mild Pain (1 - 3)  guaiFENesin  milliGRAM(s) Oral every 12 hours PRN Cough  ketorolac   Injectable 15 milliGRAM(s) IV Push every 6 hours PRN Mild Pain (1 - 3)      Vital Signs Last 24 Hrs  T(C): 36.7 (14 Oct 2024 06:00), Max: 36.9 (13 Oct 2024 22:00)  T(F): 98.1 (14 Oct 2024 06:00), Max: 98.5 (13 Oct 2024 22:00)  HR: 90 (14 Oct 2024 06:00) (65 - 129)  BP: 125/73 (14 Oct 2024 06:00) (121/73 - 145/77)  BP(mean): --  RR: 18 (14 Oct 2024 06:00) (18 - 19)  SpO2: 96% (14 Oct 2024 06:00) (96% - 100%)    Parameters below as of 14 Oct 2024 06:00  Patient On (Oxygen Delivery Method): room air      LABS:                        13.0   16.71 )-----------( 484      ( 14 Oct 2024 04:30 )             40.1     10-14    137  |  103  |  14  ----------------------------<  82  4.0   |  24  |  0.78    Ca    8.8      14 Oct 2024 04:30  Phos  4.4     10-14  Mg     2.30     10-14    TPro  6.5  /  Alb  3.1[L]  /  TBili  <0.2  /  DBili  x   /  AST  14  /  ALT  44[H]  /  AlkPhos  87  10-13          Urinalysis Basic - ( 14 Oct 2024 04:30 )    Color: x / Appearance: x / SG: x / pH: x  Gluc: 82 mg/dL / Ketone: x  / Bili: x / Urobili: x   Blood: x / Protein: x / Nitrite: x   Leuk Esterase: x / RBC: x / WBC x   Sq Epi: x / Non Sq Epi: x / Bacteria: x    PHYSICAL EXAM:    GENERAL: In no apparent distress, well nourished, and hydrated.  HEART: Regular rate and rhythm; No murmurs, rubs, or gallops.  PULMONARY: Clear to auscultation and percussion.  No rales, wheezing, or rhonchi bilaterally.  ABDOMEN: Soft, Nontender, Nondistended; Bowel sounds present  EXTREMITIES:  2+ Peripheral Pulses, No clubbing, cyanosis, or edema

## 2024-10-14 NOTE — DISCHARGE NOTE PROVIDER - NSDCMRMEDTOKEN_GEN_ALL_CORE_FT
Eliquis 5 mg oral tablet: 1 tab(s) orally 2 times a day   acetaminophen 325 mg oral tablet: 2 tab(s) orally every 6 hours As needed Temp greater or equal to 38C (100.4F), Mild Pain (1 - 3)  cefuroxime 500 mg oral tablet: 1 tab(s) orally every 12 hours  cholestyramine 4 g/9 g oral powder for reconstitution: 1 application orally once a day  Eliquis 5 mg oral tablet: 1 tab(s) orally 2 times a day  metoprolol succinate 200 mg oral tablet, extended release: 1 tab(s) orally once a day starting 10/15/24  metoprolol tartrate 75 mg oral tablet: 1 tab(s) orally once last dose 10/14/24 21:00  predniSONE 20 mg oral tablet: 1 tab(s) orally once a day last day 10/15/24

## 2024-10-14 NOTE — PROGRESS NOTE ADULT - ASSESSMENT
49 year old male with no pertinent PMH presented to the ED with fever, chills, body aches and cough. He is being treated for LL lobe PNA. Patient was also found to be in newly diagnosed atrial fibrillation with RVR. Telemetry showed atrial fibrillation with VR as high as 180s @ times in the setting of fever, chills and elevated WBC. WBC trending down. On IV antibiotics for PNA. Echocardiogram shows normal LV function. Patient self converted to sinus rhythm around 6 pm last night  - Continue to monitor on telemetry  - Maintain K>4 and Mg>2  - Increase metoprolol to 75 mg Q8H and change to metoprolol ER 200mg upon d/c  - Continue Eliquis 5 mg BID  - Treat the underlying infection  - Continue management as per primary team

## 2024-10-16 LAB
CULTURE RESULTS: SIGNIFICANT CHANGE UP
CULTURE RESULTS: SIGNIFICANT CHANGE UP
SPECIMEN SOURCE: SIGNIFICANT CHANGE UP
SPECIMEN SOURCE: SIGNIFICANT CHANGE UP

## 2024-10-19 LAB
CULTURE RESULTS: SIGNIFICANT CHANGE UP
SPECIMEN SOURCE: SIGNIFICANT CHANGE UP

## 2024-10-23 ENCOUNTER — APPOINTMENT (OUTPATIENT)
Dept: ELECTROPHYSIOLOGY | Facility: CLINIC | Age: 49
End: 2024-10-23

## 2024-10-23 ENCOUNTER — NON-APPOINTMENT (OUTPATIENT)
Age: 49
End: 2024-10-23

## 2024-10-23 VITALS
OXYGEN SATURATION: 95 % | BODY MASS INDEX: 31.42 KG/M2 | SYSTOLIC BLOOD PRESSURE: 114 MMHG | DIASTOLIC BLOOD PRESSURE: 74 MMHG | HEIGHT: 72 IN | HEART RATE: 77 BPM | WEIGHT: 232 LBS

## 2024-10-23 DIAGNOSIS — G93.31 POSTVIRAL FATIGUE SYNDROME: ICD-10-CM

## 2024-10-23 DIAGNOSIS — I48.0 PAROXYSMAL ATRIAL FIBRILLATION: ICD-10-CM

## 2024-10-23 PROCEDURE — 99214 OFFICE O/P EST MOD 30 MIN: CPT

## 2024-10-23 PROCEDURE — G2211 COMPLEX E/M VISIT ADD ON: CPT | Mod: NC

## 2024-10-23 PROCEDURE — 93000 ELECTROCARDIOGRAM COMPLETE: CPT

## 2024-10-23 RX ORDER — APIXABAN 5 MG/1
5 TABLET, FILM COATED ORAL
Qty: 30 | Refills: 11 | Status: ACTIVE | COMMUNITY
Start: 2024-10-23 | End: 1900-01-01

## 2024-10-23 RX ORDER — METOPROLOL SUCCINATE 100 MG/1
100 TABLET, EXTENDED RELEASE ORAL
Qty: 30 | Refills: 4 | Status: ACTIVE | COMMUNITY
Start: 2024-10-23 | End: 1900-01-01

## 2024-10-24 ENCOUNTER — APPOINTMENT (OUTPATIENT)
Dept: PULMONOLOGY | Facility: CLINIC | Age: 49
End: 2024-10-24
Payer: COMMERCIAL

## 2024-10-24 VITALS
WEIGHT: 232 LBS | SYSTOLIC BLOOD PRESSURE: 120 MMHG | BODY MASS INDEX: 31.42 KG/M2 | HEART RATE: 77 BPM | DIASTOLIC BLOOD PRESSURE: 80 MMHG | HEIGHT: 72 IN | RESPIRATION RATE: 16 BRPM | OXYGEN SATURATION: 97 % | TEMPERATURE: 97.4 F

## 2024-10-24 DIAGNOSIS — Z87.01 PERSONAL HISTORY OF PNEUMONIA (RECURRENT): ICD-10-CM

## 2024-10-24 DIAGNOSIS — J30.9 ALLERGIC RHINITIS, UNSPECIFIED: ICD-10-CM

## 2024-10-24 DIAGNOSIS — R93.89 ABNORMAL FINDINGS ON DIAGNOSTIC IMAGING OF OTHER SPECIFIED BODY STRUCTURES: ICD-10-CM

## 2024-10-24 DIAGNOSIS — R06.83 SNORING: ICD-10-CM

## 2024-10-24 DIAGNOSIS — R06.02 SHORTNESS OF BREATH: ICD-10-CM

## 2024-10-24 DIAGNOSIS — J45.909 UNSPECIFIED ASTHMA, UNCOMPLICATED: ICD-10-CM

## 2024-10-24 PROCEDURE — 71046 X-RAY EXAM CHEST 2 VIEWS: CPT

## 2024-10-24 PROCEDURE — 99204 OFFICE O/P NEW MOD 45 MIN: CPT | Mod: 25

## 2024-10-24 PROCEDURE — 95012 NITRIC OXIDE EXP GAS DETER: CPT

## 2024-10-24 RX ORDER — BUDESONIDE AND FORMOTEROL FUMARATE DIHYDRATE 160; 4.5 UG/1; UG/1
160-4.5 AEROSOL RESPIRATORY (INHALATION) TWICE DAILY
Qty: 3 | Refills: 1 | Status: ACTIVE | COMMUNITY
Start: 2024-10-24 | End: 1900-01-01

## 2024-10-24 RX ORDER — ALBUTEROL SULFATE AND BUDESONIDE 90; 80 UG/1; UG/1
90-80 AEROSOL, METERED RESPIRATORY (INHALATION)
Qty: 1 | Refills: 3 | Status: ACTIVE | COMMUNITY
Start: 2024-10-24 | End: 1900-01-01

## 2024-10-24 RX ORDER — PREDNISONE 10 MG/1
10 TABLET ORAL
Qty: 50 | Refills: 0 | Status: ACTIVE | COMMUNITY
Start: 2024-10-24 | End: 1900-01-01

## 2024-11-15 ENCOUNTER — RX CHANGE (OUTPATIENT)
Age: 49
End: 2024-11-15

## 2024-11-15 RX ORDER — METOPROLOL SUCCINATE 100 MG/1
100 TABLET, EXTENDED RELEASE ORAL
Qty: 90 | Refills: 2 | Status: ACTIVE | COMMUNITY
Start: 1900-01-01 | End: 1900-01-01

## 2024-11-20 ENCOUNTER — OUTPATIENT (OUTPATIENT)
Dept: OUTPATIENT SERVICES | Facility: HOSPITAL | Age: 49
LOS: 1 days | End: 2024-11-20
Payer: COMMERCIAL

## 2024-11-20 ENCOUNTER — APPOINTMENT (OUTPATIENT)
Dept: SLEEP CENTER | Facility: CLINIC | Age: 49
End: 2024-11-20
Payer: COMMERCIAL

## 2024-11-20 PROCEDURE — 95800 SLP STDY UNATTENDED: CPT | Mod: 26

## 2024-11-20 PROCEDURE — 95800 SLP STDY UNATTENDED: CPT

## 2024-11-25 DIAGNOSIS — G47.33 OBSTRUCTIVE SLEEP APNEA (ADULT) (PEDIATRIC): ICD-10-CM

## 2024-11-26 ENCOUNTER — APPOINTMENT (OUTPATIENT)
Dept: PULMONOLOGY | Facility: CLINIC | Age: 49
End: 2024-11-26
Payer: COMMERCIAL

## 2024-11-26 PROCEDURE — 99442: CPT

## 2024-11-27 ENCOUNTER — APPOINTMENT (OUTPATIENT)
Dept: ELECTROPHYSIOLOGY | Facility: CLINIC | Age: 49
End: 2024-11-27

## 2024-11-27 DIAGNOSIS — I48.0 PAROXYSMAL ATRIAL FIBRILLATION: ICD-10-CM

## 2025-01-13 ENCOUNTER — OUTPATIENT (OUTPATIENT)
Dept: OUTPATIENT SERVICES | Facility: HOSPITAL | Age: 50
LOS: 1 days | End: 2025-01-13
Payer: COMMERCIAL

## 2025-01-13 ENCOUNTER — APPOINTMENT (OUTPATIENT)
Dept: CT IMAGING | Facility: CLINIC | Age: 50
End: 2025-01-13
Payer: COMMERCIAL

## 2025-01-13 DIAGNOSIS — Z87.01 PERSONAL HISTORY OF PNEUMONIA (RECURRENT): ICD-10-CM

## 2025-01-13 PROCEDURE — 71250 CT THORAX DX C-: CPT | Mod: 26

## 2025-01-13 PROCEDURE — 71250 CT THORAX DX C-: CPT

## 2025-03-17 ENCOUNTER — APPOINTMENT (OUTPATIENT)
Dept: PULMONOLOGY | Facility: CLINIC | Age: 50
End: 2025-03-17
Payer: COMMERCIAL

## 2025-03-17 VITALS
WEIGHT: 243 LBS | OXYGEN SATURATION: 96 % | HEIGHT: 72 IN | DIASTOLIC BLOOD PRESSURE: 78 MMHG | SYSTOLIC BLOOD PRESSURE: 120 MMHG | BODY MASS INDEX: 32.91 KG/M2 | RESPIRATION RATE: 16 BRPM | HEART RATE: 93 BPM

## 2025-03-17 DIAGNOSIS — G47.33 OBSTRUCTIVE SLEEP APNEA (ADULT) (PEDIATRIC): ICD-10-CM

## 2025-03-17 DIAGNOSIS — E66.3 OVERWEIGHT: ICD-10-CM

## 2025-03-17 DIAGNOSIS — R06.02 SHORTNESS OF BREATH: ICD-10-CM

## 2025-03-17 DIAGNOSIS — J30.9 ALLERGIC RHINITIS, UNSPECIFIED: ICD-10-CM

## 2025-03-17 DIAGNOSIS — J45.909 UNSPECIFIED ASTHMA, UNCOMPLICATED: ICD-10-CM

## 2025-03-17 DIAGNOSIS — Z87.01 PERSONAL HISTORY OF PNEUMONIA (RECURRENT): ICD-10-CM

## 2025-03-17 PROCEDURE — 99214 OFFICE O/P EST MOD 30 MIN: CPT | Mod: 25

## 2025-03-17 PROCEDURE — 95012 NITRIC OXIDE EXP GAS DETER: CPT

## 2025-03-17 PROCEDURE — 94010 BREATHING CAPACITY TEST: CPT

## 2025-03-17 PROCEDURE — ZZZZZ: CPT

## 2025-03-17 PROCEDURE — 94729 DIFFUSING CAPACITY: CPT

## 2025-03-17 PROCEDURE — 94727 GAS DIL/WSHOT DETER LNG VOL: CPT

## (undated) DEVICE — TUBING OLYMPUS INSUFFLATION

## (undated) DEVICE — SOL IRR BAG NS 0.9% 3000ML

## (undated) DEVICE — BASIN SET SINGLE

## (undated) DEVICE — TROCAR COVIDIEN VERSAPORT BLADELESS OPTICAL 5MM STANDARD

## (undated) DEVICE — TIP METZENBAUM SCISSOR MONOPOLAR ENDOCUT (ORANGE)

## (undated) DEVICE — SUT VICRYL 0 27" UR-6

## (undated) DEVICE — DRAPE TOWEL 1010

## (undated) DEVICE — WARMING BLANKET UPPER ADULT

## (undated) DEVICE — POSITIONER STRAP ARMBOARD VELCRO TS-30

## (undated) DEVICE — TROCAR COVIDIEN VERSAONE BLADELESS FIXATION 11MM STANDARD

## (undated) DEVICE — DRAPE COVER SNAP 36X30"

## (undated) DEVICE — ELCTR GROUNDING PAD ADULT COVIDIEN

## (undated) DEVICE — DRAPE TOWEL BLUE 17" X 24"

## (undated) DEVICE — VENODYNE/SCD SLEEVE CALF MEDIUM

## (undated) DEVICE — GLV 7.5 PROTEXIS (CREAM) MICRO

## (undated) DEVICE — SUT MONOCRYL 4-0 27" PS-2 UNDYED

## (undated) DEVICE — SOL IRR POUR H2O 500ML

## (undated) DEVICE — SYR LUER LOK 20CC

## (undated) DEVICE — ENDOCATCH 10MM SPECIMEN POUCH

## (undated) DEVICE — DRAPE 3/4 SHEET 52X76"

## (undated) DEVICE — CATH ANGIO 14G X 3.25"

## (undated) DEVICE — SUT VICRYL 0 18" ENDOLOOP LIGATURE

## (undated) DEVICE — PACK GENERAL LAPAROSCOPY

## (undated) DEVICE — TROCAR COVIDIEN VERSAONE BLADED FIXATION 11MM STANDARD

## (undated) DEVICE — DISSECTOR ENDOSCOPIC KITTNER SINGLE TIP

## (undated) DEVICE — DRSG BENZOIN 0.6CC

## (undated) DEVICE — TUBING INSUFFLATION LAP FILTER 10FT

## (undated) DEVICE — BLADE SURGICAL #15 CARBON

## (undated) DEVICE — DRSG DERMABOND 0.7ML

## (undated) DEVICE — TUBING HYDRO-SURG PLUS IRRIGATOR W SMOKEVAC & PROBE

## (undated) DEVICE — BAG SPECIMEN RETRIEVAL ENDOBAG 3X6"

## (undated) DEVICE — DRSG STERISTRIPS 0.5 X 4"

## (undated) DEVICE — TROCAR COVIDIEN VERSAONE BLUNT TIP HASSAN 12MM